# Patient Record
Sex: FEMALE | Race: WHITE | URBAN - METROPOLITAN AREA
[De-identification: names, ages, dates, MRNs, and addresses within clinical notes are randomized per-mention and may not be internally consistent; named-entity substitution may affect disease eponyms.]

---

## 2017-09-10 ENCOUNTER — APPOINTMENT (OUTPATIENT)
Dept: CT IMAGING | Facility: CLINIC | Age: 72
DRG: 040 | End: 2017-09-10
Attending: EMERGENCY MEDICINE
Payer: MEDICARE

## 2017-09-10 ENCOUNTER — HOSPITAL ENCOUNTER (INPATIENT)
Facility: CLINIC | Age: 72
LOS: 11 days | Discharge: HOME HEALTH | DRG: 040 | End: 2017-09-21
Attending: EMERGENCY MEDICINE | Admitting: PSYCHIATRY & NEUROLOGY
Payer: MEDICARE

## 2017-09-10 DIAGNOSIS — C64.1 RENAL CELL CARCINOMA OF RIGHT KIDNEY (H): Primary | ICD-10-CM

## 2017-09-10 DIAGNOSIS — I34.0 NON-RHEUMATIC MITRAL REGURGITATION: ICD-10-CM

## 2017-09-10 DIAGNOSIS — R47.01 APHASIA: ICD-10-CM

## 2017-09-10 DIAGNOSIS — G43.009 NONINTRACTABLE MIGRAINE, UNSPECIFIED MIGRAINE TYPE: ICD-10-CM

## 2017-09-10 DIAGNOSIS — Z86.73 PERSONAL HISTORY OF TRANSIENT CEREBRAL ISCHEMIA: ICD-10-CM

## 2017-09-10 DIAGNOSIS — I63.9 CEREBROVASCULAR ACCIDENT (CVA), UNSPECIFIED MECHANISM (H): ICD-10-CM

## 2017-09-10 DIAGNOSIS — I10 ESSENTIAL HYPERTENSION, MALIGNANT: ICD-10-CM

## 2017-09-10 LAB
ANION GAP SERPL CALCULATED.3IONS-SCNC: 6 MMOL/L (ref 3–14)
APTT PPP: 34 SEC (ref 22–37)
BUN SERPL-MCNC: 21 MG/DL (ref 7–30)
CALCIUM SERPL-MCNC: 8.2 MG/DL (ref 8.5–10.1)
CHLORIDE SERPL-SCNC: 113 MMOL/L (ref 94–109)
CO2 SERPL-SCNC: 23 MMOL/L (ref 20–32)
CREAT BLD-MCNC: 1.6 MG/DL (ref 0.52–1.04)
CREAT SERPL-MCNC: 1.48 MG/DL (ref 0.52–1.04)
ERYTHROCYTE [DISTWIDTH] IN BLOOD BY AUTOMATED COUNT: 14.6 % (ref 10–15)
GFR SERPL CREATININE-BSD FRML MDRD: 32 ML/MIN/1.7M2
GFR SERPL CREATININE-BSD FRML MDRD: 35 ML/MIN/1.7M2
GLUCOSE BLDC GLUCOMTR-MCNC: 100 MG/DL (ref 70–99)
GLUCOSE BLDC GLUCOMTR-MCNC: 150 MG/DL (ref 70–99)
GLUCOSE SERPL-MCNC: 112 MG/DL (ref 70–99)
HCT VFR BLD AUTO: 42.4 % (ref 35–47)
HGB BLD-MCNC: 14.5 G/DL (ref 11.7–15.7)
INR BLD: 1.2 (ref 0.86–1.14)
INR PPP: 0.93 (ref 0.86–1.14)
MCH RBC QN AUTO: 31.8 PG (ref 26.5–33)
MCHC RBC AUTO-ENTMCNC: 34.2 G/DL (ref 31.5–36.5)
MCV RBC AUTO: 93 FL (ref 78–100)
PLATELET # BLD AUTO: 245 10E9/L (ref 150–450)
POTASSIUM SERPL-SCNC: 4.5 MMOL/L (ref 3.4–5.3)
RADIOLOGIST FLAGS: ABNORMAL
RBC # BLD AUTO: 4.56 10E12/L (ref 3.8–5.2)
SODIUM SERPL-SCNC: 142 MMOL/L (ref 133–144)
TROPONIN I BLD-MCNC: 0 UG/L (ref 0–0.1)
TROPONIN I SERPL-MCNC: 0.04 UG/L (ref 0–0.04)
WBC # BLD AUTO: 9.6 10E9/L (ref 4–11)

## 2017-09-10 PROCEDURE — 99284 EMERGENCY DEPT VISIT MOD MDM: CPT | Mod: Z6 | Performed by: EMERGENCY MEDICINE

## 2017-09-10 PROCEDURE — 99285 EMERGENCY DEPT VISIT HI MDM: CPT | Mod: 25 | Performed by: EMERGENCY MEDICINE

## 2017-09-10 PROCEDURE — 85610 PROTHROMBIN TIME: CPT | Mod: QW

## 2017-09-10 PROCEDURE — 40000740 ZZH STATISTIC STROKE CODE W/ ACCESS

## 2017-09-10 PROCEDURE — A9270 NON-COVERED ITEM OR SERVICE: HCPCS | Mod: GY | Performed by: EMERGENCY MEDICINE

## 2017-09-10 PROCEDURE — 85610 PROTHROMBIN TIME: CPT | Performed by: EMERGENCY MEDICINE

## 2017-09-10 PROCEDURE — 85730 THROMBOPLASTIN TIME PARTIAL: CPT | Performed by: EMERGENCY MEDICINE

## 2017-09-10 PROCEDURE — 00000146 ZZHCL STATISTIC GLUCOSE BY METER IP

## 2017-09-10 PROCEDURE — 84484 ASSAY OF TROPONIN QUANT: CPT

## 2017-09-10 PROCEDURE — 12000008 ZZH R&B INTERMEDIATE UMMC

## 2017-09-10 PROCEDURE — 84484 ASSAY OF TROPONIN QUANT: CPT | Performed by: EMERGENCY MEDICINE

## 2017-09-10 PROCEDURE — 85027 COMPLETE CBC AUTOMATED: CPT | Performed by: EMERGENCY MEDICINE

## 2017-09-10 PROCEDURE — 40000827 ZZH STATISTIC TRAUMA CODE W/ ACCESS

## 2017-09-10 PROCEDURE — 25000132 ZZH RX MED GY IP 250 OP 250 PS 637: Mod: GY | Performed by: EMERGENCY MEDICINE

## 2017-09-10 PROCEDURE — 80048 BASIC METABOLIC PNL TOTAL CA: CPT | Performed by: EMERGENCY MEDICINE

## 2017-09-10 PROCEDURE — 82565 ASSAY OF CREATININE: CPT

## 2017-09-10 PROCEDURE — 70450 CT HEAD/BRAIN W/O DYE: CPT

## 2017-09-10 PROCEDURE — 40000141 ZZH STATISTIC PERIPHERAL IV START W/O US GUIDANCE

## 2017-09-10 RX ORDER — ASPIRIN 81 MG/1
81 TABLET ORAL DAILY
Status: DISCONTINUED | OUTPATIENT
Start: 2017-09-11 | End: 2017-09-21 | Stop reason: HOSPADM

## 2017-09-10 RX ORDER — NALOXONE HYDROCHLORIDE 0.4 MG/ML
.1-.4 INJECTION, SOLUTION INTRAMUSCULAR; INTRAVENOUS; SUBCUTANEOUS
Status: DISCONTINUED | OUTPATIENT
Start: 2017-09-10 | End: 2017-09-21 | Stop reason: HOSPADM

## 2017-09-10 RX ORDER — LEVOTHYROXINE SODIUM 88 UG/1
88 TABLET ORAL
Status: DISCONTINUED | OUTPATIENT
Start: 2017-09-11 | End: 2017-09-21 | Stop reason: HOSPADM

## 2017-09-10 RX ORDER — ONDANSETRON 2 MG/ML
4 INJECTION INTRAMUSCULAR; INTRAVENOUS EVERY 6 HOURS PRN
Status: DISCONTINUED | OUTPATIENT
Start: 2017-09-10 | End: 2017-09-21 | Stop reason: HOSPADM

## 2017-09-10 RX ORDER — HEPARIN SODIUM 5000 [USP'U]/.5ML
5000 INJECTION, SOLUTION INTRAVENOUS; SUBCUTANEOUS EVERY 12 HOURS
Status: DISCONTINUED | OUTPATIENT
Start: 2017-09-11 | End: 2017-09-21 | Stop reason: HOSPADM

## 2017-09-10 RX ORDER — LIDOCAINE 40 MG/G
CREAM TOPICAL
Status: DISCONTINUED | OUTPATIENT
Start: 2017-09-10 | End: 2017-09-21 | Stop reason: HOSPADM

## 2017-09-10 RX ORDER — CARVEDILOL 3.12 MG/1
12.5 TABLET ORAL 2 TIMES DAILY WITH MEALS
Status: DISCONTINUED | OUTPATIENT
Start: 2017-09-11 | End: 2017-09-21 | Stop reason: HOSPADM

## 2017-09-10 RX ORDER — IOPAMIDOL 755 MG/ML
75 INJECTION, SOLUTION INTRAVASCULAR ONCE
Status: DISCONTINUED | OUTPATIENT
Start: 2017-09-10 | End: 2017-09-10

## 2017-09-10 RX ORDER — ROSUVASTATIN CALCIUM 10 MG/1
10 TABLET, COATED ORAL DAILY
Status: DISCONTINUED | OUTPATIENT
Start: 2017-09-11 | End: 2017-09-13

## 2017-09-10 RX ORDER — METOCLOPRAMIDE HYDROCHLORIDE 5 MG/ML
10 INJECTION INTRAMUSCULAR; INTRAVENOUS ONCE
Status: DISCONTINUED | OUTPATIENT
Start: 2017-09-10 | End: 2017-09-15 | Stop reason: CLARIF

## 2017-09-10 RX ORDER — ACETAMINOPHEN 325 MG/1
650 TABLET ORAL EVERY 4 HOURS PRN
Status: DISCONTINUED | OUTPATIENT
Start: 2017-09-10 | End: 2017-09-16

## 2017-09-10 RX ORDER — CLOPIDOGREL BISULFATE 75 MG/1
75 TABLET ORAL DAILY
Status: DISCONTINUED | OUTPATIENT
Start: 2017-09-11 | End: 2017-09-21 | Stop reason: HOSPADM

## 2017-09-10 RX ORDER — ONDANSETRON 4 MG/1
4 TABLET, ORALLY DISINTEGRATING ORAL EVERY 6 HOURS PRN
Status: DISCONTINUED | OUTPATIENT
Start: 2017-09-10 | End: 2017-09-21 | Stop reason: HOSPADM

## 2017-09-10 RX ORDER — LABETALOL HYDROCHLORIDE 5 MG/ML
10 INJECTION, SOLUTION INTRAVENOUS EVERY 10 MIN PRN
Status: DISCONTINUED | OUTPATIENT
Start: 2017-09-10 | End: 2017-09-21 | Stop reason: HOSPADM

## 2017-09-10 RX ORDER — ACETAMINOPHEN 650 MG/1
650 SUPPOSITORY RECTAL EVERY 4 HOURS PRN
Status: DISCONTINUED | OUTPATIENT
Start: 2017-09-10 | End: 2017-09-16

## 2017-09-10 RX ORDER — OXYCODONE AND ACETAMINOPHEN 5; 325 MG/1; MG/1
1 TABLET ORAL ONCE
Status: COMPLETED | OUTPATIENT
Start: 2017-09-10 | End: 2017-09-10

## 2017-09-10 RX ORDER — SODIUM CHLORIDE 9 MG/ML
INJECTION, SOLUTION INTRAVENOUS CONTINUOUS
Status: DISCONTINUED | OUTPATIENT
Start: 2017-09-10 | End: 2017-09-12

## 2017-09-10 RX ADMIN — OXYCODONE HYDROCHLORIDE AND ACETAMINOPHEN 1 TABLET: 5; 325 TABLET ORAL at 21:30

## 2017-09-10 ASSESSMENT — VISUAL ACUITY
OU: BASELINE

## 2017-09-10 ASSESSMENT — ENCOUNTER SYMPTOMS: SPEECH DIFFICULTY: 1

## 2017-09-10 NOTE — IP AVS SNAPSHOT
[address] [address]    Phone:  [phone]                                       After Visit Summary   9/10/2017    Sowmya Gibbs    MRN: [id number]           After Visit Summary Signature Page     I have received my discharge instructions, and my questions have been answered. I have discussed any challenges I see with this plan with the nurse or doctor.    ..........................................................................................................................................  Patient/Patient Representative Signature      ..........................................................................................................................................  Patient Representative Print Name and Relationship to Patient    ..................................................               ................................................  Date                                            Time    ..........................................................................................................................................  Reviewed by Signature/Title    ...................................................              ..............................................  Date                                                            Time

## 2017-09-10 NOTE — IP AVS SNAPSHOT
MRN:[id number]                      After Visit Summary   9/10/2017    Sowmya Gibbs    MRN: [id number]           Thank you!     Thank you for choosing [facility] for your care. Our goal is always to provide you with excellent care. Hearing back from our patients is one way we can continue to improve our services. Please take a few minutes to complete the written survey that you may receive in the mail after you visit with us. Thank you!        Patient Information     Date Of Birth          1945        Designated Caregiver       Most Recent Value    Caregiver    Will someone help with your care after discharge? no    Name of designated caregiver  Celina Johnson    Phone number of caregiver [phone]      About your hospital stay     You were admitted on:  September 10, 2017 You last received care in the:  [address] [facility]    You were discharged on:  September 21, 2017        Reason for your hospital stay       Acute Stroke                  Who to Call     For medical emergencies, please call 911.  For non-urgent questions about your medical care, please call your primary care provider or clinic, [phone]          Attending Provider     Provider Specialty    Angie Orr MD Emergency Medicine    Barrington, Giovanny Harvey MD Neurology       Primary Care Provider Office Phone # Fax #    Chaka Knott [phone]       When to contact your care team       Call the Stroke Team Your risk factors for stroke or TIA (transient ischemic attack): Your risk factors for stroke or TIA (transient ischemic attack):    Your Risk Factors Your Results Normal Ranges  High blood pressure BP Readings from Last 1 Encounters:  09/19/17 : 107/63   Less than 120/80  Cholesterol              Total Lab Results       Component                Value               Date                       CHOL                     161                 09/11/2017             Less than 150    Triglycerides   Lab Results       Component                Value               Date                       TRIG                     224                 2017           Less than 150  LDL Lab Results       Component                Value               Date                       LDL                      88                  2017              Less than 70  HDL Lab Results       Component                Value               Date                       HDL                      28                  2017                   Greater than 40 (men)  Greater than 50 (women)  Diabetes @labrcntip(g)@ Fasting blood glucose   Smoking/tobacco use  Quit smoking and tobacco  Overweight  Lose 1-2 pounds a week  Lack of exercise  30 minutes moderate activity each day  Other risk factors include carotid (neck) artery disease, atrial fibrillation and stress. You may be on new medicine to treat high blood pressure, cholesterol, diabetes or atrial fibrillation.    Understanding Stroke Booklet given to patient. Please refer to booklet for further information.    Stroke warning signs and symptoms - CALL 911 right away for:  - Sudden numbness or weakness in the face, arm or leg (often on one side of the body).  - Sudden confusion or trouble understanding what is going on.  - Sudden blurred or decreased vision in one or both eyes.  - Sudden trouble speaking, loss of balance, dizziness or problems with coordination.  - Sudden, severe headache for no reason.  - Fainting or seizures.  - Symptoms may go away then come back suddenly.                  After Care Instructions     Activity       Your activity upon discharge: activity as tolerated            Diet       Follow this diet upon discharge: Orders Placed This Encounter      Calorie Counts      Snacks/Supplements Adult: Ensure Plus (Adult); Between Meals      Regular Diet Adult            Discharge Instructions           Monitor and record       blood pressure daily                   Follow-up Appointments     Adult [facility] Follow-up and recommended labs and tests       Follow up with primary care provider, OCHOA HAN, within 7 days to evaluate medication change.  No follow up labs or test are needed.      Appointments on [place] and/or [facility] (with [facility] or [facility] provider or service). Call [phone] if you haven't heard regarding these appointments within 7 days of discharge.            Follow Up and recommended labs and tests       Please contact Stroke Clinic- Neurology Clinic at [phone], pick option #1,  if you have not been contacted to schedule a stroke follow up appointment in 5 days of discharge.  If you have other stroke related questions, please call [phone], pick option #3, and ask to speak to Jian Wong RN Stroke/Endovascular Care Coordinator.  If you have any urgent stroke related questions after hours, please contact the hospital  at [phone] and ask to be connected to a stroke provider.                  Your next 10 appointments already scheduled     Feb 13, 2018 11:20 AM CST   (Arrive by 11:05 AM)   New Stroke with Hoa Damico MD   [facility] ([facility])    [address]  3rd Floor  [place] [address] [phone]              Additional Services     Cardiology Eval Adult Referral       Your provider has referred you to: for the mobile [place]     Please be aware that coverage of these services is subject to the terms and limitations of your health insurance plan.  Call member services at your health plan with any benefit or coverage questions.      Type of Referral:  Cardiology Follow Up    Timeframe requested:  Less than 1 week    Please bring the following to your appointment:  >>   Any x-rays, CTs or MRIs which have been performed.  Contact the facility where they were done to arrange for  prior to your scheduled appointment.    >>   List of current  medications  >>   This referral request   >>   Any documents/labs given to you for this referral            Cardiology Eval Adult Referral       Your provider has referred you to:  [facility]: [facility] [phone]   [email or web address]    Please be aware that coverage of these services is subject to the terms and limitations of your health insurance plan.  Call member services at your health plan with any benefit or coverage questions.      Type of Referral:  Cardiology Follow Up    Timeframe requested:  >1 week    Please bring the following to your appointment:  >>   Any x-rays, CTs or MRIs which have been performed.  Contact the facility where they were done to arrange for  prior to your scheduled appointment.    >>   List of current medications  >>   This referral request   >>   Any documents/labs given to you for this referral            Home Care SLP Referral for Hospital Discharge       SLP to eval and treat    Your provider has ordered home care - speech therapy. If you have not been contacted within 2 days of your discharge please call the department phone number listed on the top of this document.            Home Care Social Service Referral for Hospital Discharge        to her financial evaluation    Your provider has ordered home care - . If you have not been contacted within 2 days of your discharge please call the department phone number listed on the top of this document.            Speech Therapy Referral       This therapy referral will be filtered to a centralized scheduling office at [facility] and the patient will receive a call to schedule an appointment at a [facility] location most convenient for them.    [facility] provides Speech Therapy evaluation and treatment and many specialty services across the [facility] system.  If requesting  "a specialty program, please choose from the list below.  If you have not heard from the scheduling office within 2 business days, please call [phone] for all locations, with the exception of [place], please call [phone].       Treatment: Evaluation & Treatment  Speech Treatment Diagnosis: Aphasia    Special Instructions: for expressive Aphasia   Special Programs: Augmentative Communication    Please be aware that coverage of these services is subject to the terms and limitations of your health insurance plan.  Call member services at your health plan with any benefit or coverage questions.      **Note to Provider:  If you are referring outside of [facility] for the therapy appointment, please list the name of the location in the \"special instructions\" above, print the referral and give to the patient to schedule the appointment.                  Future tests that were ordered for you     CT Chest abdomen pelvis w & w/o contrast       Administration of IV contrast (contrast agent, dose, and amount) will be tailored to this examination per the appropriate written protocol listed in the Protocol E-Book, or by the supervising imaging provider. Please check Cr levels prior to to examination.                  Further instructions from your care team         Home Care after a Loop Recorder Implant  Wound care:  Check for signs of infection each day.  These include increased redness, swelling, drainage or a fever over 101 F (38.3 C).  Call us immediately if you see any of these signs.  You may shower 24 hours after the procedure.  Do not submerge the incision (in a bath tub, hot tub, or swimming pool) until fully healed.  Do not apply powder or lotion to the incision for 14 days.     Pain:   You may have mild pain for 3 to 5 days.  Take acetaminophen (Tylenol) or ibuprofen (Advil) for the pain.  Call us if the pain is severe or lasts more than 5 days.    Activity:  After 24 hours, slowly return to your normal " activities.      Avoid anything that may cause rough contact or a hard hit to your chest.  This includes football, hockey, and other contact sports.    Follow Up Visits:  Return to the clinic in 7 to 10 days to have your loop recorder and wound checked.      Telling others about your device:  Before you have any medical tests or treatments, tell the doctors, dentists, and other care providers about your device.  There are a few tests and treatments that may interfere with your device.  Your care team may need to take special steps to keep you safe.  Before you leave the hospital, you will receive a temporary ID card.  A permanent card will be mailed to you about 6 to 8 weeks later.  Always carry the ID card with you.  It has important details about your device.  Safety near electrical equipment:  All of these are safe to use when in good repair -    Microwaves    Radios    Cordless phone    Remote controls    Small electrical tools  Security sherman: It is okay to walk through security sherman at the airports and department stores. Tell airport security that you have an implanted loop recorder.  Full-body scanners are safe.  Activator: Black and Gray (pictured below). Carry this with you. Press the button if you have symptoms.            Home Monitor: White and Blue (pictured below). Plug in at home. Use under device clinic direction.    Questions?  Please call [facility].    General inquiry: [phone]    Device Nurse:          Business Hours:  [phone]                         After Hours:  [phone]   Choose option 4, then ask for the                             on-call device nurse at job code [id number].    Your next device clinic appointment is scheduled on:     ___________________________ at _____________.          [facility] - [address]  [address] [place] [address]  [phone]        Pending Results     No orders found from 9/8/2017  "to 2017.            Statement of Approval     Ordered          17 4756  I have reviewed and agree with all the recommendations and orders detailed in this document.  EFFECTIVE NOW     Approved and electronically signed by:  Anat Duncan MD           17 2687  I have reviewed and agree with all the recommendations and orders detailed in this document.  EFFECTIVE NOW     Approved and electronically signed by:  Noé Gage MD             Admission Information     Date & Time Provider Department Dept. Phone    9/10/2017 Giovanny Negrete MD [address] [facility] [place] [phone]      Your Vitals Were     Blood Pressure Pulse Temperature Respirations Weight Last Period    100/56 (BP Location: Right arm) 80 96.2  F (35.7  C) (Oral) 16 50.6 kg (111 lb 8 oz) 2003    Pulse Oximetry BMI (Body Mass Index)                96% 18.55 kg/m2          MyChart Information     [employer] lets you send messages to your doctor, view your test results, renew your prescriptions, schedule appointments and more. To sign up, go to [email or web address] . Click on \"Log in\" on the left side of the screen, which will take you to the Welcome page. Then click on \"Sign up Now\" on the right side of the page.     You will be asked to enter the access code listed below, as well as some personal information. Please follow the directions to create your username and password.     Your access code is: [email or web address]  Expires: 2017  9:16 AM     Your access code will  in 90 days. If you need help or a new code, please call your [facility] clinic or [phone].        Care EveryWhere ID     This is your Care EveryWhere ID. This could be used by other organizations to access your [facility] medical records  [id number]        Equal Access to Services     KATHY BRICENO : Arnel Evangelista, gisela chau, deirdre rosado. So [facility] [phone].    ATENCIÓN: " Si val colbert, tiene a ni disposición servicios gratuitos de asistencia lingüística. Ronald dallas [phone].    We comply with applicable federal civil rights laws and [place] laws. We do not discriminate on the basis of race, color, national origin, age, disability sex, sexual orientation or gender identity.               Review of your medicines      START taking        Dose / Directions    acetaminophen 325 MG tablet   Commonly known as:  TYLENOL   Used for:  Cerebrovascular accident (CVA), unspecified mechanism (H)        Dose:  650 mg   Take 2 tablets (650 mg) by mouth every 6 hours as needed for mild pain or fever   Quantity:  100 tablet   Refills:  11       aspirin 81 MG EC tablet   Used for:  Cerebrovascular accident (CVA), unspecified mechanism (H)        Dose:  81 mg   Take 1 tablet (81 mg) by mouth daily   Quantity:  60 tablet   Refills:  11       atorvastatin 80 MG tablet   Commonly known as:  LIPITOR   Used for:  Cerebrovascular accident (CVA), unspecified mechanism (H)        Dose:  80 mg   Take 1 tablet (80 mg) by mouth daily   Quantity:  30 tablet   Refills:  11       ibuprofen 600 MG tablet   Commonly known as:  ADVIL/MOTRIN   Used for:  Cerebrovascular accident (CVA), unspecified mechanism (H)        Dose:  600 mg   Take 1 tablet (600 mg) by mouth every 6 hours as needed for moderate pain   Quantity:  120 tablet   Refills:  3         CONTINUE these medicines which may have CHANGED, or have new prescriptions. If we are uncertain of the size of tablets/capsules you have at home, strength may be listed as something that might have changed.        Dose / Directions    * carvedilol 12.5 MG tablet   Commonly known as:  COREG   This may have changed:  Another medication with the same name was added. Make sure you understand how and when to take each.   Used for:  Stable angina (H), Coronary artery disease involving native coronary artery of native heart without angina pectoris        Dose:  12.5 mg    Take 1 tablet (12.5 mg) by mouth 2 times daily (with meals)   Quantity:  180 tablet   Refills:  0       * carvedilol 12.5 MG tablet   Commonly known as:  COREG   This may have changed:  You were already taking a medication with the same name, and this prescription was added. Make sure you understand how and when to take each.   Used for:  Cerebrovascular accident (CVA), unspecified mechanism (H)        Dose:  12.5 mg   Take 1 tablet (12.5 mg) by mouth 2 times daily (with meals)   Quantity:  60 tablet   Refills:  11       * levothyroxine 88 MCG tablet   Commonly known as:  SYNTHROID/LEVOTHROID   This may have changed:  Another medication with the same name was added. Make sure you understand how and when to take each.   Used for:  Other specified hypothyroidism        Dose:  88 mcg   Take 1 tablet (88 mcg) by mouth daily   Quantity:  90 tablet   Refills:  3       * levothyroxine 88 MCG tablet   Commonly known as:  SYNTHROID/LEVOTHROID   This may have changed:  You were already taking a medication with the same name, and this prescription was added. Make sure you understand how and when to take each.   Used for:  Cerebrovascular accident (CVA), unspecified mechanism (H)        Dose:  88 mcg   Take 1 tablet (88 mcg) by mouth every morning (before breakfast)   Quantity:  30 tablet   Refills:  11       * Notice:  This list has 4 medication(s) that are the same as other medications prescribed for you. Read the directions carefully, and ask your doctor or other care provider to review them with you.      CONTINUE these medicines which have NOT CHANGED        Dose / Directions    Alpha-Lipoic Acid 200 MG Tabs   Used for:  Type 2 diabetes mellitus with diabetic nephropathy (H)        Dose:  1 tablet   Take 1 tablet by mouth 2 times daily   Quantity:  180 tablet   Refills:  3       ASPIRIN NOT PRESCRIBED   Commonly known as:  INTENTIONAL   Used for:  Type 2 diabetes mellitus, controlled, with renal complications (H),  Coronary artery disease involving native coronary artery without angina pectoris        Dose:  1 each   1 each daily Antiplatelet medication not prescribed intentionally due to Current thienopryridine therapy   Quantity:  0 each   Refills:  0       blood glucose calibration NORMAL solution   Used for:  Type 2 diabetes, HbA1C goal < 8% (H)        Use to calibrate blood glucose monitor as directed.   Quantity:  1 Bottle   Refills:  11       blood glucose lancing device   Used for:  Type 2 diabetes mellitus with diabetic nephropathy (H)        Use to test blood sugars TWICE DAILY  times daily or as directed.   Quantity:  300 each   Refills:  0       blood glucose monitoring lancets   Used for:  Type 2 diabetes, HbA1C goal < 8% (H)        Use to test blood sugars TWICE DAILY  times daily or as directed. GENERIC OK   Quantity:  1 Box   Refills:  11       blood glucose monitoring meter device kit   Used for:  Type 2 diabetes, HbA1C goal < 8% (H)        Use to test blood sugars TWICE DAILY  times daily or as directed. MAY HAVE GENERIC CONTROL SOLUTIONS, STRIPS TWICE DAILY AND LANCETS  NEWLY DISCOVERED DIABETES 2 GOAL 8% 250.00 MAY HAVE GENERIC KIT AS WELL   Quantity:  1 kit   Refills:  0       blood glucose monitoring test strip   Commonly known as:  ZEE CONTOUR   Used for:  Type 2 diabetes, HbA1C goal < 8% (H)        Use to test blood sugars TWICE DAILY  times daily or as directed.   Quantity:  100 strip   Refills:  11       cinnamon 500 MG Caps   Commonly known as:  HM CINNAMON   Used for:  Coronary artery disease involving native coronary artery of native heart without angina pectoris        Dose:  2 capful   Take 2 capfuls by mouth 3 times daily   Quantity:  180 capsule   Refills:  11       clopidogrel 75 MG tablet   Commonly known as:  PLAVIX   Used for:  Cerebrovascular accident (CVA), unspecified mechanism (H)        Dose:  75 mg   Take 1 tablet (75 mg) by mouth daily   Quantity:  30 tablet   Refills:  3        FISH OIL        Dose:  3 capsule   3 capsules every morning.   Refills:  0       HYDROcodone-acetaminophen 7.5-325 MG per tablet   Commonly known as:  NORCO   Used for:  Nonintractable migraine, unspecified migraine type        Dose:  1 tablet   Take 1 tablet by mouth every 6 hours as needed for moderate to severe pain   Quantity:  30 tablet   Refills:  0       nitroGLYcerin 0.4 MG sublingual tablet   Commonly known as:  NITROSTAT   Used for:  CAD (coronary artery disease)        Dose:  0.4 mg   Place 1 tablet (0.4 mg) under the tongue every 5 minutes as needed for chest pain   Quantity:  25 tablet   Refills:  prn       SODIUM BICARBONATE PO        Dose:  20 mg   Take 20 mg by mouth 3 times daily   Refills:  0       vitamin D 2000 UNITS Caps   Used for:  Vitamin D deficiency        Dose:  1 tablet   Take 1 tablet by mouth daily   Quantity:  90 capsule   Refills:  3       zolpidem 5 MG tablet   Commonly known as:  AMBIEN   Used for:  Insomnia, unspecified insomnia        TAKE ONE TABLET BY MOUTH ONCE DAILY AT BEDTIME AS NEEDED   Quantity:  15 tablet   Refills:  2         STOP taking     fenofibrate 145 MG tablet           rosuvastatin 10 MG tablet   Commonly known as:  CRESTOR                Where to get your medicines      These medications were sent to [facility] [place], [place] - [address]  [address] [address]     Phone:  [phone]     acetaminophen 325 MG tablet    carvedilol 12.5 MG tablet    levothyroxine 88 MCG tablet         Some of these will need a paper prescription and others can be bought over the counter. Ask your nurse if you have questions.     Bring a paper prescription for each of these medications     aspirin 81 MG EC tablet    atorvastatin 80 MG tablet    clopidogrel 75 MG tablet    ibuprofen 600 MG tablet                Protect others around you: Learn how to safely use, store and throw away your medicines at www.disposemymeds.org.              Medication List: This is a list of all your medications and when to take them. Check marks below indicate your daily home schedule. Keep this list as a reference.      Medications           Morning Afternoon Evening Bedtime As Needed    acetaminophen 325 MG tablet   Commonly known as:  TYLENOL   Take 2 tablets (650 mg) by mouth every 6 hours as needed for mild pain or fever   Last time this was given:  650 mg on 9/19/2017  1:25 AM                                Alpha-Lipoic Acid 200 MG Tabs   Take 1 tablet by mouth 2 times daily                                aspirin 81 MG EC tablet   Take 1 tablet (81 mg) by mouth daily   Last time this was given:  81 mg on 9/21/2017  8:09 AM                                ASPIRIN NOT PRESCRIBED   Commonly known as:  INTENTIONAL   1 each daily Antiplatelet medication not prescribed intentionally due to Current thienopryridine therapy                                atorvastatin 80 MG tablet   Commonly known as:  LIPITOR   Take 1 tablet (80 mg) by mouth daily   Last time this was given:  80 mg on 9/21/2017  8:14 AM                                blood glucose calibration NORMAL solution   Use to calibrate blood glucose monitor as directed.                                blood glucose lancing device   Use to test blood sugars TWICE DAILY  times daily or as directed.                                blood glucose monitoring lancets   Use to test blood sugars TWICE DAILY  times daily or as directed. GENERIC OK                                blood glucose monitoring meter device kit   Use to test blood sugars TWICE DAILY  times daily or as directed. MAY HAVE GENERIC CONTROL SOLUTIONS, STRIPS TWICE DAILY AND LANCETS  NEWLY DISCOVERED DIABETES 2 GOAL 8% 250.00 MAY HAVE GENERIC KIT AS WELL                                blood glucose monitoring test strip   Commonly known as:  Moment CONTOUR   Use to test blood sugars TWICE DAILY  times daily or as directed.                                *  carvedilol 12.5 MG tablet   Commonly known as:  COREG   Take 1 tablet (12.5 mg) by mouth 2 times daily (with meals)   Last time this was given:  12.5 mg on 9/19/2017  6:19 PM                                * carvedilol 12.5 MG tablet   Commonly known as:  COREG   Take 1 tablet (12.5 mg) by mouth 2 times daily (with meals)   Last time this was given:  12.5 mg on 9/19/2017  6:19 PM                                cinnamon 500 MG Caps   Commonly known as:  HM CINNAMON   Take 2 capfuls by mouth 3 times daily                                clopidogrel 75 MG tablet   Commonly known as:  PLAVIX   Take 1 tablet (75 mg) by mouth daily   Last time this was given:  75 mg on 9/21/2017  8:09 AM                                FISH OIL   3 capsules every morning.                                HYDROcodone-acetaminophen 7.5-325 MG per tablet   Commonly known as:  NORCO   Take 1 tablet by mouth every 6 hours as needed for moderate to severe pain   Last time this was given:  2 tablets on 9/21/2017 12:33 PM                                ibuprofen 600 MG tablet   Commonly known as:  ADVIL/MOTRIN   Take 1 tablet (600 mg) by mouth every 6 hours as needed for moderate pain   Last time this was given:  600 mg on 9/20/2017  5:01 PM                                * levothyroxine 88 MCG tablet   Commonly known as:  SYNTHROID/LEVOTHROID   Take 1 tablet (88 mcg) by mouth daily   Last time this was given:  88 mcg on 9/21/2017  8:09 AM                                * levothyroxine 88 MCG tablet   Commonly known as:  SYNTHROID/LEVOTHROID   Take 1 tablet (88 mcg) by mouth every morning (before breakfast)   Last time this was given:  88 mcg on 9/21/2017  8:09 AM                                nitroGLYcerin 0.4 MG sublingual tablet   Commonly known as:  NITROSTAT   Place 1 tablet (0.4 mg) under the tongue every 5 minutes as needed for chest pain                                SODIUM BICARBONATE PO   Take 20 mg by mouth 3 times daily                                 vitamin D 2000 UNITS Caps   Take 1 tablet by mouth daily                                zolpidem 5 MG tablet   Commonly known as:  AMBIEN   TAKE ONE TABLET BY MOUTH ONCE DAILY AT BEDTIME AS NEEDED   Last time this was given:  2.5 mg on 9/20/2017 10:17 PM                                * Notice:  This list has 4 medication(s) that are the same as other medications prescribed for you. Read the directions carefully, and ask your doctor or other care provider to review them with you.

## 2017-09-11 ENCOUNTER — APPOINTMENT (OUTPATIENT)
Dept: MRI IMAGING | Facility: CLINIC | Age: 72
DRG: 040 | End: 2017-09-11
Payer: MEDICARE

## 2017-09-11 ENCOUNTER — APPOINTMENT (OUTPATIENT)
Dept: SPEECH THERAPY | Facility: CLINIC | Age: 72
DRG: 040 | End: 2017-09-11
Payer: MEDICARE

## 2017-09-11 ENCOUNTER — APPOINTMENT (OUTPATIENT)
Dept: CARDIOLOGY | Facility: CLINIC | Age: 72
DRG: 040 | End: 2017-09-11
Payer: MEDICARE

## 2017-09-11 LAB
CARDIOLIPIN ANTIBODY IGG: <1.6 GPL-U/ML (ref 0–19.9)
CARDIOLIPIN ANTIBODY IGM: 0.3 MPL-U/ML (ref 0–19.9)
CHOLEST SERPL-MCNC: 161 MG/DL
CRP SERPL-MCNC: <2.9 MG/L (ref 0–8)
D DIMER PPP FEU-MCNC: 0.8 UG/ML FEU (ref 0–0.5)
ERYTHROCYTE [SEDIMENTATION RATE] IN BLOOD BY WESTERGREN METHOD: 43 MM/H (ref 0–30)
GLUCOSE BLDC GLUCOMTR-MCNC: 138 MG/DL (ref 70–99)
GLUCOSE BLDC GLUCOMTR-MCNC: 158 MG/DL (ref 70–99)
GLUCOSE BLDC GLUCOMTR-MCNC: 92 MG/DL (ref 70–99)
HDLC SERPL-MCNC: 28 MG/DL
LDLC SERPL CALC-MCNC: 88 MG/DL
NONHDLC SERPL-MCNC: 133 MG/DL
PLATELET # BLD AUTO: 192 10E9/L (ref 150–450)
TRIGL SERPL-MCNC: 224 MG/DL

## 2017-09-11 PROCEDURE — 85049 AUTOMATED PLATELET COUNT: CPT | Performed by: PSYCHIATRY & NEUROLOGY

## 2017-09-11 PROCEDURE — 25000132 ZZH RX MED GY IP 250 OP 250 PS 637: Mod: GY | Performed by: PSYCHIATRY & NEUROLOGY

## 2017-09-11 PROCEDURE — 86146 BETA-2 GLYCOPROTEIN ANTIBODY: CPT | Performed by: STUDENT IN AN ORGANIZED HEALTH CARE EDUCATION/TRAINING PROGRAM

## 2017-09-11 PROCEDURE — 25000132 ZZH RX MED GY IP 250 OP 250 PS 637: Mod: GY | Performed by: STUDENT IN AN ORGANIZED HEALTH CARE EDUCATION/TRAINING PROGRAM

## 2017-09-11 PROCEDURE — 00000167 ZZHCL STATISTIC INR NC: Performed by: STUDENT IN AN ORGANIZED HEALTH CARE EDUCATION/TRAINING PROGRAM

## 2017-09-11 PROCEDURE — 86147 CARDIOLIPIN ANTIBODY EA IG: CPT | Performed by: STUDENT IN AN ORGANIZED HEALTH CARE EDUCATION/TRAINING PROGRAM

## 2017-09-11 PROCEDURE — 85379 FIBRIN DEGRADATION QUANT: CPT | Performed by: STUDENT IN AN ORGANIZED HEALTH CARE EDUCATION/TRAINING PROGRAM

## 2017-09-11 PROCEDURE — 85652 RBC SED RATE AUTOMATED: CPT | Performed by: PSYCHIATRY & NEUROLOGY

## 2017-09-11 PROCEDURE — 40000893 ZZH STATISTIC PT IP EVAL DEFER

## 2017-09-11 PROCEDURE — 92507 TX SP LANG VOICE COMM INDIV: CPT | Mod: GN

## 2017-09-11 PROCEDURE — 36415 COLL VENOUS BLD VENIPUNCTURE: CPT | Performed by: STUDENT IN AN ORGANIZED HEALTH CARE EDUCATION/TRAINING PROGRAM

## 2017-09-11 PROCEDURE — A9270 NON-COVERED ITEM OR SERVICE: HCPCS | Mod: GY | Performed by: STUDENT IN AN ORGANIZED HEALTH CARE EDUCATION/TRAINING PROGRAM

## 2017-09-11 PROCEDURE — 93306 TTE W/DOPPLER COMPLETE: CPT

## 2017-09-11 PROCEDURE — 00000146 ZZHCL STATISTIC GLUCOSE BY METER IP

## 2017-09-11 PROCEDURE — 00000401 ZZHCL STATISTIC THROMBIN TIME NC: Performed by: STUDENT IN AN ORGANIZED HEALTH CARE EDUCATION/TRAINING PROGRAM

## 2017-09-11 PROCEDURE — 40000264 ECHO COMPLETE BUBBLE

## 2017-09-11 PROCEDURE — 36415 COLL VENOUS BLD VENIPUNCTURE: CPT | Performed by: PSYCHIATRY & NEUROLOGY

## 2017-09-11 PROCEDURE — 70544 MR ANGIOGRAPHY HEAD W/O DYE: CPT

## 2017-09-11 PROCEDURE — 85730 THROMBOPLASTIN TIME PARTIAL: CPT | Performed by: STUDENT IN AN ORGANIZED HEALTH CARE EDUCATION/TRAINING PROGRAM

## 2017-09-11 PROCEDURE — 12000008 ZZH R&B INTERMEDIATE UMMC

## 2017-09-11 PROCEDURE — 86140 C-REACTIVE PROTEIN: CPT | Performed by: PSYCHIATRY & NEUROLOGY

## 2017-09-11 PROCEDURE — 80061 LIPID PANEL: CPT | Performed by: PSYCHIATRY & NEUROLOGY

## 2017-09-11 PROCEDURE — 85597 PHOSPHOLIPID PLTLT NEUTRALIZ: CPT | Performed by: STUDENT IN AN ORGANIZED HEALTH CARE EDUCATION/TRAINING PROGRAM

## 2017-09-11 PROCEDURE — 85613 RUSSELL VIPER VENOM DILUTED: CPT | Performed by: STUDENT IN AN ORGANIZED HEALTH CARE EDUCATION/TRAINING PROGRAM

## 2017-09-11 PROCEDURE — 25000128 H RX IP 250 OP 636: Performed by: PSYCHIATRY & NEUROLOGY

## 2017-09-11 PROCEDURE — 92523 SPEECH SOUND LANG COMPREHEN: CPT | Mod: GN

## 2017-09-11 PROCEDURE — A9270 NON-COVERED ITEM OR SERVICE: HCPCS | Mod: GY | Performed by: PSYCHIATRY & NEUROLOGY

## 2017-09-11 PROCEDURE — 25000128 H RX IP 250 OP 636: Performed by: STUDENT IN AN ORGANIZED HEALTH CARE EDUCATION/TRAINING PROGRAM

## 2017-09-11 PROCEDURE — 70547 MR ANGIOGRAPHY NECK W/O DYE: CPT

## 2017-09-11 PROCEDURE — 40000225 ZZH STATISTIC SLP WARD VISIT

## 2017-09-11 PROCEDURE — 85732 THROMBOPLASTIN TIME PARTIAL: CPT | Performed by: STUDENT IN AN ORGANIZED HEALTH CARE EDUCATION/TRAINING PROGRAM

## 2017-09-11 PROCEDURE — 93306 TTE W/DOPPLER COMPLETE: CPT | Mod: 26 | Performed by: INTERNAL MEDICINE

## 2017-09-11 PROCEDURE — 83036 HEMOGLOBIN GLYCOSYLATED A1C: CPT | Performed by: PSYCHIATRY & NEUROLOGY

## 2017-09-11 RX ORDER — LORAZEPAM 1 MG/1
1 TABLET ORAL ONCE
Status: DISCONTINUED | OUTPATIENT
Start: 2017-09-11 | End: 2017-09-11

## 2017-09-11 RX ORDER — HYDROCODONE BITARTRATE AND ACETAMINOPHEN 7.5; 325 MG/1; MG/1
1 TABLET ORAL EVERY 8 HOURS PRN
Status: DISCONTINUED | OUTPATIENT
Start: 2017-09-11 | End: 2017-09-11

## 2017-09-11 RX ORDER — FENTANYL CITRATE 50 UG/ML
25 INJECTION, SOLUTION INTRAMUSCULAR; INTRAVENOUS EVERY 5 MIN PRN
Status: DISCONTINUED | OUTPATIENT
Start: 2017-09-11 | End: 2017-09-11

## 2017-09-11 RX ORDER — HYDROCODONE BITARTRATE AND ACETAMINOPHEN 7.5; 325 MG/1; MG/1
1-2 TABLET ORAL EVERY 6 HOURS PRN
Status: DISCONTINUED | OUTPATIENT
Start: 2017-09-11 | End: 2017-09-15

## 2017-09-11 RX ORDER — LORAZEPAM 2 MG/ML
1 INJECTION INTRAMUSCULAR ONCE
Status: COMPLETED | OUTPATIENT
Start: 2017-09-11 | End: 2017-09-11

## 2017-09-11 RX ORDER — FENTANYL CITRATE 50 UG/ML
25-50 INJECTION, SOLUTION INTRAMUSCULAR; INTRAVENOUS
Status: DISCONTINUED | OUTPATIENT
Start: 2017-09-11 | End: 2017-09-11

## 2017-09-11 RX ORDER — NICOTINE 21 MG/24HR
1 PATCH, TRANSDERMAL 24 HOURS TRANSDERMAL DAILY
Status: DISCONTINUED | OUTPATIENT
Start: 2017-09-11 | End: 2017-09-11

## 2017-09-11 RX ADMIN — ROSUVASTATIN CALCIUM 10 MG: 10 TABLET, FILM COATED ORAL at 07:32

## 2017-09-11 RX ADMIN — HEPARIN SODIUM 5000 UNITS: 5000 INJECTION, SOLUTION INTRAVENOUS; SUBCUTANEOUS at 07:39

## 2017-09-11 RX ADMIN — LORAZEPAM 1 MG: 2 INJECTION INTRAMUSCULAR; INTRAVENOUS at 17:16

## 2017-09-11 RX ADMIN — ACETAMINOPHEN 650 MG: 325 TABLET ORAL at 01:00

## 2017-09-11 RX ADMIN — ASPIRIN 81 MG: 81 TABLET, COATED ORAL at 07:35

## 2017-09-11 RX ADMIN — HYDROCODONE BITARTRATE AND ACETAMINOPHEN 1 TABLET: 7.5; 325 TABLET ORAL at 12:14

## 2017-09-11 RX ADMIN — LEVOTHYROXINE SODIUM 88 MCG: 88 TABLET ORAL at 07:35

## 2017-09-11 RX ADMIN — CARVEDILOL 12.5 MG: 3.12 TABLET, FILM COATED ORAL at 20:04

## 2017-09-11 RX ADMIN — SODIUM CHLORIDE: 9 INJECTION, SOLUTION INTRAVENOUS at 00:04

## 2017-09-11 RX ADMIN — HYDROCODONE BITARTRATE AND ACETAMINOPHEN 1 TABLET: 7.5; 325 TABLET ORAL at 12:48

## 2017-09-11 RX ADMIN — CLOPIDOGREL 75 MG: 75 TABLET, FILM COATED ORAL at 07:32

## 2017-09-11 RX ADMIN — HEPARIN SODIUM 5000 UNITS: 5000 INJECTION, SOLUTION INTRAVENOUS; SUBCUTANEOUS at 20:04

## 2017-09-11 RX ADMIN — ACETAMINOPHEN 650 MG: 325 TABLET ORAL at 06:11

## 2017-09-11 ASSESSMENT — VISUAL ACUITY
OU: BASELINE;BLURRED VISION

## 2017-09-11 ASSESSMENT — ACTIVITIES OF DAILY LIVING (ADL)
WHICH_OF_THE_ABOVE_FUNCTIONAL_RISKS_HAD_A_RECENT_ONSET_OR_CHANGE?: COGNITION;COMMUNICATION/SPEECH
BATHING: 0-->INDEPENDENT
COMMUNICATION: 2-->DIFFICULTY UNDERSTANDING (NOT RELATED TO LANGUAGE BARRIER)
TOILETING: 0-->INDEPENDENT
TOILETING: 0-->INDEPENDENT
BATHING: 0-->INDEPENDENT
RETIRED_COMMUNICATION: 0-->UNDERSTANDS/COMMUNICATES WITHOUT DIFFICULTY
CHANGE_IN_FUNCTIONAL_STATUS_SINCE_ONSET_OF_CURRENT_ILLNESS/INJURY: YES
TRANSFERRING: 0-->INDEPENDENT
TRANSFERRING: 0-->INDEPENDENT
SWALLOWING: 0-->SWALLOWS FOODS/LIQUIDS WITHOUT DIFFICULTY
AMBULATION: 0-->INDEPENDENT
SWALLOWING: 0-->SWALLOWS FOODS/LIQUIDS WITHOUT DIFFICULTY
FALL_HISTORY_WITHIN_LAST_SIX_MONTHS: NO
DRESS: 0-->INDEPENDENT
COGNITION: 1 - ATTENTION OR MEMORY DEFICITS
AMBULATION: 0-->INDEPENDENT
DRESS: 0-->INDEPENDENT
EATING: 0-->INDEPENDENT
RETIRED_EATING: 0-->INDEPENDENT

## 2017-09-11 NOTE — CONSULTS
[facility]   PM&R CONSULT    Consulting Provider: Aries Huber  Reason for Consult: Assessment of rehabilitation   Location of Patient: 6 a  Date of Encounter: 9/11/2017       ASSESSMENT/PLAN:    Ms. Sowmya Gibbs is a Right handed 71 year old yo female with past medical history significant for hypertension, hyperlipidemia, diabetes, prior strokes resulting in left home in ominous hemianopsia, who presented on 9- with symptoms of difficulty speaking of more than 12 hours duration  Her initial NIH SS score was 2.  Head CT was done and showed a acute infarct in the anterior and middle left temporal lobe. Patient has history of chronic kidney disease and as such a CT angiogram angiogram could not be performed. She has undergone a T PARVEZ bubble study and an MRI and an MRI is pending.  On examination she is noted to be with expressive aphasia, some receptive aphasia as noted, and mild cognitive deficits as well as poor insight. Patient is quite frustrated with her inability to speak. She has limited comprehension.    In the earlier encounter when she was alone, her social history and support system could not be evaluated. We will do second attempt in the afternoon, when she was accompanied by her daughter and grandchildren. Has daughter reported that the patient eats herself lives in [place] along with another daughter. She was not aware that the patient was in the [facility]. She is not aware where the patient was living. Given    Given that she does not have an appropriate discharge plan would recommend transitional care unit placement for safety. However following are recommendations patient was adamant repeatedly saying no to another facility.  Despite patient's refusal, we continue to report recommended a transitional care unit for safety and ongoing management of aphasia.    HPI:        Sowmya Gibbs is a right-handed 71-year-old female with past medical history  of hypertension hyperlipidemia diabetes chronic kidney disease and type and time and stroke with multiple TIAs and CVAs. She presented on 9-10-17 she woke up with headaches and in ability to speak  Initial NIH SS was 2      CT head revealed acute infarcts in the anterior and middle left temporal lobe.    She has significant by a past medical history and has diabetic and previous stroke which resulted in left homonymous hemianopsia.  Further imaging studies including a bubble study and MRI are being planned.      PREVIOUS LEVEL OF FUNCTION   prior to this admission she was independent with all aspects of mobility and ADLs. Per her daughter she traveled extensively including to [place] as well as last month.      CURRENT FUNCTION   PT  Discussed  with physical therapy she was evaluated and discharged due to no further needs for physical therapy.    In occupational therapy she ambulated without an assistive device and no concern for ADL    CURRENT RN NEEDS   with the RN she has been ambulating with standby assist.    She has a CHO consistent diet.    The patient has been wanting to go outside the whole day. She is noted to be steady on her feet.    SOCIAL HISTORY/Home Setting/Support:   The social history is somewhat concerning. Per her daughter and lives with her sister in [place]. Her daughter states that she was unaware that the patient had moved to the [facility].    Patient's daughter states that she is not aware where the patient was living in [facility] she had recently been to [place]. A discharge plan is unclear.  Social History     Social History     Marital status:      Spouse name: N/A     Number of children: N/A     Years of education: N/A     Social History Main Topics     Smoking status: Former Smoker     Types: Cigarettes     Quit date: 2/4/2016     Smokeless tobacco: Never Used      Comment: patient smokes about 3 cigarettes per day     Alcohol use No     Drug use: No     Sexual activity: No      Other Topics Concern     Parent/Sibling W/ Cabg, Mi Or Angioplasty Before 65f 55m? Yes     mother and sisters     Social History Narrative    Surgical History  Return To Top     Status Surgery Time Frame Comment Record Date     Inactive  laminectomy  1976, 1986 4/14/2008      Inactive  Kidney surgery  6/23/04  Cancer, kidney partial removal  4/14/2008          --------------------------------------------------------------------------------    Food Allergy  Return To Top     Allergen Reaction Comment Record Date     * No known food allergies      9/28/2007          --------------------------------------------------------------------------------    Drug Allergy  Return To Top     Allergen Reaction Comment Record Date     TRICYCLIC ANTIDEPRESSANT    HYPER CRAZY  12/3/2008      Oxycodone  pruritis    12/3/2008      Valium      4/14/2008      SOMATROPIN  Could not walk or talk    4/14/2008      CODEINE      4/14/2008      DEMEROL      4/14/2008      SEPTRA      4/14/2008      Ceclor      4/14/2008      Penicillin      4/14/2008      Ultram  GI distress    4/14/2008          --------------------------------------------------------------------------------    Environment Allergy  Return To Top     Allergen Reaction Comment Record Date     * No known environmental allergies      9/28/2007          --------------------------------------------------------------------------------    Social History  Return To Top     Question Answer Comment Record Date     Marital status      9/28/2007      Emotional Abuse  No    9/7/2012      Exercise      9/28/2007      Caffeine  Yes    9/7/2012      Physical Abuse  No    9/7/2012      Sealtbelts      9/28/2007      Sexual Abuse  No    9/7/2012      Breast/Testicle Self Check      9/28/2007      Number of children  3    12/7/2007      Living arrangements  Apartment/Condo    9/28/2007      Number of children in household  0    9/28/2007      Number of adults in household  1     2007      Education level  College Graduate    2007      Employment  Currently employed    2007      Tobacco history  Quit less than 3 years ago    2007      Tobacco history  Currently smokes tobacco    2007      Number of years using tobacco  20    2007      Number of cigarettes/day  1  a 6-7 days when under stress  2007      Alcohol history  Never drinks alcohol    2007      Has the patient used marijuana?  No    2012      Has the patient used cocaine?  No    2012          --------------------------------------------------------------------------------    Medical History Return To Top     Status Diagnosis Time Frame Comment Record Date     Active (458.29) - C - Hypotension, iatrogenic      2012      Active (285.1) - C - Anemia, acute blood loss      2012      Active (440.21) - C - Intermittent claudication    right calf  2012      Active (440.0) - C - AORTIC ATHEROSCLEROSIS    with heavy calcifiaction  2012      Active (413.9) - C - Angina pectoris, NOS    home stressors difficulties in present living situation exacerbating this  2012      Active (435.9) - C - Transient ischemic attack, unspecified      2012      Active (414.01) - C - Coronary atherosclerosis, native coronary artery    STENTS X 2  AND 2012      Active (189.9) - C - Urinary cancer      2011      Active (276.8) - C - Hypokalemia      2011      Active (780.52) - C - Insomnia      2007      Active (401.1) - C - Hypertension, benign      2007      Active 305.1 Tobacco abuse      of lung cancer  2007      Active (244.9) - C - Hypothyroidism      2007      Active 346.00 Migraine, classic, not intractable      2007      Active 733.02 IDIOPATHIC OSTEOPOROSIS      2007      Active (780.79) - C - Fatigue      2007      Active 435.9 Transient ischemic attack, unspecified    4 small strokes with aphasia  transient  8/14/2007      Active (304.90) - C - Drug dependence, unspecified    darvon  8/14/2007      Active 189.9 Urinary cancer    small left kidney  8/14/2007      Active 346.80 MIGRAINE OT NOT INTRACTABLE      8/14/2007      Active (722.73) - C - Lumbar disc disorder w/myelopathy      8/14/2007      Active (311) - C - Depression      8/14/2007      Active (627.9) - C - Menopause      8/14/2007      Active (272.4) - C - Hyperlipidemia      8/14/2007      Inactive  (189.9) - C - Urinary cancer      4/22/2008      Inactive  (435.9) - C - Transient ischemic attack, unspecified      4/22/2008          --------------------------------------------------------------------------------    Medication History Return To Top         Isosorbide Mononitrate SR 30 mg 24 hr Tab, 1 Tablet(s), PO, daily, 30 days, 11 refills, for a total of 30, start on June 05, 2012 and end on May 30, 2013.     Aspirin 81 mg Tab, Delayed Release, 1 Tablet(s), PO, daily, 90 days, 3 refills, for a total of 90, start on April 06, 2012 and end on March 31, 2013.     Crestor 10 mg Tab, 1 Tablet(s), PO, daily, 30 days, 11 refills, for a total of 30, start on April 06, 2012 and end on March 31, 2013.     Levothyroxine 88 mcg Tab, 1 Tablet(s), PO, daily, 30 days, 11 refills, for a total of 30, start on April 06, 2012 and end on March 31, 2013.     Plavix 75 mg Tab, 1 Tablet(s), PO, daily, 30 days, 12 refills, for a total of 30, start on April 06, 2012 and end on April 30, 2013.     Potassium Chloride SR 10 mEq Cap, 1 Capsule(s), PO, daily, 30 days, 11 refills, for a total of 30, start on April 06, 2012, end on March 31, 2013 and every day.     Lyrica 50 mg Cap and Oral.     Vicodin 5 mg-500 mg Tab, 1 Tablet(s), PO, QID PRN, 30 days, for a total of 20, start on September 07, 2012, end on October 06, 2012 and prn migraine HA- #20 must last one month per Dr Knott.     Ambien 10 mg Tab, 1 Tablet(s), PO, QHS PRN, 30 days, 2 refills, for a total of 12, start  on September 07, 2012 and end on December 05, 2012.     Zolpidem 10 mg Tab, 1 Tablet(s), PO, QHS PRN, 30 days, for a total of 20, start on July 26, 2012, end on August 24, 2012 and TAKE ONE TABLET BY MOUTH AT BEDTIME AS NEEDED FOR SLEEP TO LAST 30 DAYS (Appended: Controlled substance eRx refill - RxReferenceNumber: [id number]).     Hydrocodone-Acetaminophen 5 mg-500 mg Cap, 1 Capsule(s), PO, QID PRN, 30 days, for a total of 30, start on July 26, 2012, end on August 24, 2012 and ONE PO QID PRN MIGRAINE TO LAST ONE MONTH prn migraine.     Zolpidem 10 mg Tab, Tablet(s), PO, for a total of 15, start on June 20, 2012, end on July 25, 2012, TAKE ONE TABLET BY MOUTH AT BEDTIME AS NEEDED FOR SLEEP TO LAST 30 DAYS (Appended: Controlled substance eRx refill - RxReferenceNumber: [id number]) and discontinued because: Refilled.     Hydrocodone-Acetaminophen 5 mg-500 mg Cap, 1 Capsule(s), PO, QID PRN, 30 days, 1 refill, for a total of 30, start on June 07, 2012, end on July 25, 2012, ONE PO QID PRN MIGRAINE TO LAST ONE MONTH prn migraine and discontinued because: Refilled.     Zolpidem 10 mg Tab, 1 Tablet(s), PO, QHS PRN, 30 days, for a total of 15, start on June 07, 2012, end on June 20, 2012, prn sleep, discontinued because: Discontinued and Response to an electronic refill request for a controlled substance (Schedule IV) request - RxReferenceNumber: [id number].     Zolpidem 10 mg Tab, 1 Tablet(s), PO, QHS PRN, 30 days, for a total of 15, start on May 10, 2012, end on June 04, 2012, prn sleep and discontinued because: Refilled.     Hydrocodone-Acetaminophen 5 mg-500 mg Cap, 1 Capsule(s), PO, QID PRN, 30 days, for a total of 20, start on May 10, 2012, end on June 04, 2012, ONE PO QID PRN MIGRAINE TO LAST ONE MONTH prn migraine and discontinued because: Refilled.     Hydrocodone-Acetaminophen 5 mg-500 mg Cap, 1 Capsule(s), PO, QID PRN, 30 days, for a total of 20, start on April 06, 2012, end on May 05, 2012, ONE PO QID PRN MIGRAINE TO  LAST ONE MONTH prn migraine and discontinued because: Refilled.     Zolpidem 10 mg Tab, 1 Tablet(s), PO, QHS PRN, 30 days, for a total of 15, start on April 06, 2012, end on May 05, 2012, prn sleep and discontinued because: Refilled.     Sertraline 25 mg Tab, 2 Tablet(s), PO, daily, 30 days, 3 refills, for a total of 60, start on April 06, 2012, end on June 05, 2012, 1/2 TABLET WITH FOOD X 15 DAYS THEN 1 TABLET DAILY WITH FOOD THEN 1.5 TABLETS DAILY X 15 DAYS THEN 2 TABLETS DAILY and discontinued because: Deleted.     Losartan 100 mg Tab, 1 Tablet(s), PO, daily, 30 days, 5 refills, for a total of 30, start on April 06, 2012, end on September 07, 2012 and discontinued because: Deleted.     Hydrocodone-Acetaminophen 5 mg-500 mg Cap, 1 Capsule(s), PO, QID PRN, 30 days, for a total of 20, start on March 06, 2012, end on April 04, 2012, ONE PO QID PRN MIGRAINE TO LAST ONE MONTH and discontinued because: Refilled.     Zolpidem 10 mg Tab, Tablet(s), PO, QHS PRN, 30 days, for a total of 12, start on February 15, 2012, end on March 15, 2012 and TAKE ONE TABLET BY MOUTH AT BEDTIME AS NEEDED FOR SLEEP (Appended: Controlled substance eRx refill - RxReferenceNumber: [id number]).     Hydrocodone-Acetaminophen 5 mg-500 mg Cap, 1 Capsule(s), PO, QID PRN, 30 days, for a total of 20, start on February 13, 2012, end on March 05, 2012, ONE PO QID PRN MIGRAINE TO LAST ONE MONTH and discontinued because: Refilled.     Zolpidem 10 mg Tab, Tablet(s), PO, for a total of 12, start on February 09, 2012, end on February 14, 2012, TAKE ONE TABLET BY MOUTH AT BEDTIME AS NEEDED FOR SLEEP (Appended: Controlled substance eRx refill - RxReferenceNumber: [id number]) and discontinued because: Refilled.     Zolpidem 10 mg Tab, Tablet(s), PO, for a total of 12, start on February 06, 2012, end on February 09, 2012, TAKE ONE TABLET BY MOUTH AT BEDTIME AS NEEDED - MUST LAST 30 DAYS (Appended: Controlled substance eRx refill - RxReferenceNumber: [id number]),  discontinued because: Discontinued and Response to an electronic refill request for a controlled substance (Schedule IV) request - RxReferenceNumber: [id number].     Zolpidem 10 mg Tab, Tablet(s), PO, for a total of 12, start on February 06, 2012, end on February 06, 2012, TAKE ONE TABLET BY MOUTH AT BEDTIME AS NEEDED FOR SLEEP (Appended: Controlled substance eRx refill - RxReferenceNumber: [id number]), discontinued because: Discontinued and Response to an electronic refill request for a controlled substance (Schedule IV) request - RxReferenceNumber: [id number].     Zolpidem 10 mg Tab, Tablet(s), PO, 30 days, for a total of 12, start on December 22, 2011, end on February 06, 2012, TAKE ONE TABLET BY MOUTH AT BEDTIME AS NEEDED OV NEEDED), discontinued because: Discontinued and Response to an electronic refill request for a controlled substance (Schedule IV) request - RxReferenceNumber: [id number].     Vicodin 5 mg-500 mg Tab, 1 Tablet(s), PO, QID PRN, 30 days, for a total of 20, start on December 20, 2011, end on January 18, 2012, prn migraine HA- #20 must last one month per Dr Knott and discontinued because: Refilled.     Zolpidem 10 mg Tab, 1 Tablet(s), PO, QHS PRN, 30 days, for a total of 12, start on November 22, 2011, end on December 21, 2011 and TAKE ONE TABLET BY MOUTH AT BEDTIME AS NEEDED--per Dr Knott, #12 to last one month.     Vicodin 5 mg-500 mg Tab, 1 Tablet(s), PO, QID PRN, 30 days, for a total of 20, start on November 22, 2011, end on December 19, 2011, prn migraine HA- #20 must last one month per Dr Knott and discontinued because: Refilled.     Zolpidem 10 mg Tab, 1 Tablet(s), PO, QHS PRN, 30 days, for a total of 12, start on October 24, 2011, end on November 22, 2011, TAKE ONE TABLET BY MOUTH AT BEDTIME AS NEEDED--per Dr Knott, #12 to last one month, discontinued because: Discontinued and Response to an electronic refill request for a controlled substance (Schedule IV) request - RxReferenceNumber:  [id number].     Vicodin 5 mg-500 mg Tab, 1 Tablet(s), PO, QID PRN, 30 days, for a total of 20, start on October 24, 2011, end on November 21, 2011, prn migraine HA- #20 must last one month per Dr Knott and discontinued because: Refilled.     Vicodin 5 mg-500 mg Tab, 1 Tablet(s), PO, QID PRN, 28 days, for a total of 20, start on September 26, 2011, end on October 23, 2011, prn migraine HA- #20 must last one month per Dr Sharma and discontinued because: Refilled.     Zolpidem 10 mg Tab, 1 Tablet(s), PO, QHS PRN, 12 days, for a total of 12, start on September 24, 2011, end on October 24, 2011, TAKE ONE TABLET BY MOUTH AT BEDTIME AS NEEDED (Appended: Controlled substance eRx refill - RxReferenceNumber: [id number]), discontinued because: Discontinued and Response to an electronic refill request for a controlled substance (Schedule IV) request - RxReferenceNumber: [id number].     Zolpidem 10 mg Tab, Tablet(s), PO, for a total of 12, start on August 25, 2011, end on September 24, 2011, TAKE ONE TABLET BY MOUTH AT BEDTIME AS NEEDED (Appended: Controlled substance eRx refill - RxReferenceNumber: [id number]), discontinued because: Discontinued and Response to an electronic refill request for a controlled substance (Schedule IV) request - RxReferenceNumber: [id number].     Zolpidem 10 mg Tab, Tablet(s), PO, for a total of 12, start on August 25, 2011, end on December 21, 2011, TAKE ONE TABLET BY MOUTH AT BEDTIME AS NEEDED (Appended: Controlled substance eRx refill - RxReferenceNumber: [id number]) and discontinued because: Refilled.     Vicodin 5 mg-500 mg Tab, 1 Tablet(s), PO, QID PRN, 5 days, for a total of 20, start on August 25, 2011, end on August 29, 2011, prn migraine HA- #20 must last one month and discontinued because: Refilled.     Zolpidem 10 mg Tab, 1 Tablet(s), PO, QHS PRN, 30 days, for a total of 12, start on August 25, 2011, end on August 25, 2011, discontinued because: Discontinued and Response to an electronic refill request  for a controlled substance (Schedule IV) request - RxReferenceNumber: [id number].     Zolpidem 10 mg Tab, 1 Tablet(s), PO, QHS PRN, 30 days, for a total of 12, start on July 27, 2011, end on August 23, 2011 and discontinued because: Refilled.     Vicodin 5 mg-500 mg Tab, 1 Tablet(s), PO, QID PRN, 30 days, for a total of 20, start on July 27, 2011, end on August 24, 2011, prn migraine HA- #20 must last one month and discontinued because: Refilled.     Benicar 20 mg Tab, 1/2 Tablet(s), PO, daily, 30 days, 1 refill, for a total of 15, start on June 23, 2011 and end on August 21, 2011.     Vicodin 5 mg-500 mg Tab, 1 Tablet(s), PO, QID PRN, 30 days, for a total of 20, start on June 23, 2011, end on July 22, 2011, to last 3 months through July 2008 and discontinued because: Refilled.     Ambien 10 mg Tab, 1 Tablet(s), PO, QHS PRN, 30 days, for a total of 12, start on June 23, 2011, end on July 22, 2011 and discontinued because: Refilled.     Vicodin 5 mg-500 mg Tab, 1 Tablet(s), PO, QID PRN, 90 days, for a total of 60, start on May 09, 2011, end on June 22, 2011, to last 3 months through July 2008 and discontinued because: Refilled.     Ambien 10 mg Tab, 1 Tablet(s), PO, QHS PRN, 90 days, for a total of 30, start on May 09, 2011, end on June 22, 2011 and discontinued because: Refilled.     Ambien 10 mg Tab, 1 Tablet(s), PO, QHS PRN, 30 days, for a total of 21, start on April 15, 2011, end on May 08, 2011 and discontinued because: Refilled.     Ambien 10 mg Tab, 1 Tablet(s), PO, QHS PRN, 30 days, 2 refills, for a total of 21, start on March 02, 2011, end on April 14, 2011 and discontinued because: Refilled.     Crestor 10 mg Tab, 1 Tablet(s), PO, daily, 30 days, 11 refills, for a total of 30, start on February 25, 2011, end on February 19, 2012 and discontinued because: Refilled.     Levothroid 88 mcg Tab, 1 Tablet(s), PO, daily, 30 days, 11 refills, for a total of 30, start on February 25, 2011 and end on February 19, 2012.      Vicodin 5 mg-500 mg Tab, 1 Tablet(s), PO, QID PRN, 2 refills, for a total of 20, start on February 25, 2011 and Must last for one month. OK per [facility] for #20, 0 RF's.     Vicodin 5 mg-500 mg Tab, 1 Tablet(s), PO, QID PRN, for a total of 20, start on January 25, 2011, Must last for one month. OK per [facility] for #20, 0 RF's and discontinued because: Refilled.     Vicodin 5 mg-500 mg Tab, 1 Tablet(s), PO, QID PRN, for a total of 60, start on November 01, 2010, Must last for 3 months. OK #60 with not RF's per [facility]. and discontinued because: Refilled.     Vicodin 5 mg-500 mg Tab, 1 Tablet(s), PO, PRN, 30 days, 2 refills, for a total of 20, start on July 29, 2010 and one po 4 x daily prn migraine.     Ambien 10 mg Tab, 1 Tablet(s), PO, QHS, 30 days, 2 refills, for a total of 30, start on July 29, 2010, end on October 26, 2010, 4/26/10 pt needs OV per [facility] and discontinued because: Refilled.     Crestor 10 mg Tab, 1 Tablet(s), PO, daily, 30 days, 11 refills, for a total of 30, start on July 29, 2010, end on February 24, 2011 and discontinued because: Refilled.     Levothroid 88 mcg Tab, 1 Tablet(s), PO, daily, 90 days, 3 refills, for a total of 90, start on July 29, 2010, end on February 24, 2011 and discontinued because: Refilled.     Vicodin 5 mg-500 mg Tab, 1 Tablet(s), PO, BID PRN, 30 days, for a total of 60, start on June 28, 2010, end on July 27, 2010 and discontinued because: Refilled.     Ambien 10 mg Tab, 1 Tablet(s), PO, QHS, for a total of 21, start on April 26, 2010, 4/26/10 pt needs OV per [facility] and discontinued because: Refilled.     Simvastatin 40 mg Tab, 1/2 Tablet(s), PO, daily, 30 days, 1 refill, for a total of 15, start on January 18, 2010, end on March 18, 2010 and NEED LDL AND SGOT WHEN ON THIS ONE MONTH.     Levothroid 88 mcg Tab, 1 Tablet(s), PO, daily, 90 days, for a total of 90, start on January 15, 2010, end on April 14, 2010 and discontinued because: Refilled.     Ambien 10 mg Tab, 1 Tablet(s), PO, QHS,  30 days, for a total of 30, start on January 15, 2010, end on 2010 and discontinued because: Refilled.     Vicodin 5 mg-500 mg Tab, 1 Tablet(s), PO, BID PRN, 30 days, 2 refills, for a total of 60, start on January 15, 2010, end on 2010 and discontinued because: Refilled.     Crestor 10 mg Tab, 1 Tablet(s), PO, daily, 30 days, 11 refills, for a total of 30, start on January 15, 2010, end on 2010 and discontinued because: Refilled.     Vicodin 5 mg-500 mg Tab, 1 Tablet(s), PO, PRN, 30 days, 2 refills, for a total of 20, start on 2009, end on 2010, one po 4 x daily prn migraine and discontinued because: Refilled.     Ambien 10 mg Tab, 1 Tablet(s), PO, QHS, 21 days, for a total of 21, start on 2009, end on 2009 and NEIDA OMALLEY WILL CALL IN.     Levothroid 88 mcg Tab, 1 Tablet(s), PO, daily, 30 days, for a total of 30, start on 2009, end on 2010, NEED OFFICE VISIT & LABS  [facility] and discontinued     --------------------------------------------------------------------------------    Family History  Return To Top     Status Relationship Disease Comment Record Date     Alive Sister  Uterine cancer    2008         Father  Myocardial infarction  Age of death 72  2008         Paternal grandfather  Renal failure  Age of death 52  2008         Mother  Septicemia  Age of death 63  2008                 Past Medical History:  Past Medical History:   Diagnosis Date     Arthritis      CAD (coronary artery disease) 3/26/2014     Coronary artery disease      Coronary artery disease involving native coronary artery without angina pectoris 3/26/2015     Hypertension      Malignant neoplasm (H)      Thyroid disease      Type 2 diabetes mellitus, controlled, with renal complications (H) 2015     Type 2 diabetes, HbA1C goal < 8% (H) 2015     Unspecified cerebral  artery occlusion with cerebral infarction            Current Medications:  Current Facility-Administered Medications   Medication     lidocaine 1 % 1 mL     lidocaine (LMX4) kit     sodium chloride (PF) 0.9% PF flush 3 mL     sodium chloride (PF) 0.9% PF flush 3 mL     metoclopramide (REGLAN) injection 10 mg     carvedilol (COREG) tablet 12.5 mg     clopidogrel (PLAVIX) tablet 75 mg     levothyroxine (SYNTHROID/LEVOTHROID) tablet 88 mcg     rosuvastatin (CRESTOR) tablet 10 mg     SODIUM BICARBONATE PO 20 mg     naloxone (NARCAN) injection 0.1-0.4 mg     heparin sodium PF injection 5,000 Units     0.9% sodium chloride infusion     aspirin EC EC tablet 81 mg    Or     aspirin suspension 81 mg     labetalol (NORMODYNE/TRANDATE) injection 10 mg     acetaminophen (TYLENOL) tablet 650 mg    Or     acetaminophen (TYLENOL) solution 650 mg    Or     acetaminophen (TYLENOL) Suppository 650 mg     ondansetron (ZOFRAN-ODT) ODT tab 4 mg    Or     ondansetron (ZOFRAN) injection 4 mg         Review of Systems:  Total of ten systems reviewed, pertinent positives and negatives as follows  Instability with standing and walking.    Feels generally fatigued  Denies weakness  Denies any tingling or numbness  No problems with bladder.   No chest pain. No cough or SOB.  No visual changes.   No headache or photophobia.   No nausea, or abdominal pain.  No joint pain, muscle pain or swelling.  Remainder of the review of the systems was negative.        Labs   Lab Results   Component Value Date    WBC 9.6 09/10/2017    HGB 14.5 09/10/2017    HCT 42.4 09/10/2017    MCV 93 09/10/2017     09/11/2017     Lab Results   Component Value Date     09/10/2017    POTASSIUM 4.5 09/10/2017    CHLORIDE 113 (H) 09/10/2017    CO2 23 09/10/2017     (H) 09/10/2017     Lab Results   Component Value Date    GFRESTIMATED 32 (L) 09/10/2017    GFRESTBLACK 39 (L) 09/10/2017     Lab Results   Component Value Date    AST 16.0 09/07/2012    ALT 24  09/04/2015    ALKPHOS 135 06/05/2012    BILITOTAL 0.3 04/06/2012    BILICONJ 0.0 12/19/2007     Lab Results   Component Value Date    INR 1.2 (H) 09/10/2017     Lab Results   Component Value Date    BUN 21 09/10/2017    CR 1.48 (H) 09/10/2017         ON EXAMINATION:  Vitals:    09/11/17 0343 09/11/17 0543 09/11/17 0700 09/11/17 0732   BP: 134/66 138/68 136/71    BP Location: Right arm Right arm Right arm    Pulse:  63 58 54   Resp: 18 16 16    Temp: 98  F (36.7  C) 97.3  F (36.3  C) 97.7  F (36.5  C)    TempSrc: Oral Oral Oral    SpO2:  97% 98%    Weight:           Physical Exam:  Blood pressure 136/71, pulse 54, temperature 97.7  F (36.5  C), temperature source Oral, resp. rate 16, weight 55.7 kg (122 lb 12.7 oz), last menstrual period 01/25/2003, SpO2 98 %, not currently breastfeeding.    GEN: NAD, seated/lying comfortably in bed  She/He is alert, appropriate, cooperative  Word finding difficulty  Comprehension is impaired   HEENT: NCAT  MSK: full active and passive ROM at all major joints of the bilaterally upper and lower extremities  No muscle atrophy noted  ABD: soft, non tender, pos BS  NEURO:   CRANIAL NERVES: Discs flat. Pupils equal, round and reactive to light.  Extraocular movements full. Visual fields full. Face moves symmetrically.  Tongue midline. Hearing intact to finger rubbing.    Transfer are independent   Gait is without an assistive device   Poor safety awareness   SKIN: no rashes or lesions noted.   EXT: no edema bilaterally, chronic stasis changes, no ulcers.         Thank you for the consult. Please call for any questions. Pager number [phone]     Gisselle Cid       Total of 70 min spent in this encounter, more than 50% in counseling and coordination.

## 2017-09-11 NOTE — ED PROVIDER NOTES
"    [facility] EMERGENCY DEPARTMENT ([facility])  9/10/17   History     Chief Complaint   Patient presents with     Aphasia     The history is provided by the patient and medical records.     Sowmya Gibbs is a 71 year old female who presents with expressive aphasia that started this morning. She has a complex past medical history including distant history of TIAs and CVAs that manifested as aphasia as well as migraine headaches with aphasia. She also has a history of type II diabetes, chronic kidney disease (with kidney injury and creatinine as high as 5.1 in 2015), hypertension, hyperlipidemia, and chronic back pain. History difficult to obtain as patient is having speech difficulty. She was brought here by family and states that symptoms started this morning with difficulty speaking. With much effort and difficulty she states that she has had \"3 strikes\" in the past. It is unclear what her last known normal is, as patient isn't able to verbally state this. She is unable to answer if she woke up with these symptoms today.     Epic records reviewed, including outside record from [facility] scanned into the system. Patient has a history of hospitalization from 9/6 to 9/12/14 for acute ischemic stroke. At that time she had sudden onset of aphasia. Unfortunately the imaging reports are not available, just discharge summary.   The aphasia resolved while she was inpatient. She has been maintained on aspirin and plavix. Patient has left homonymous hemianopsia as a result from her strokes. 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     I have reviewed the Medications, Allergies, Past Medical and Surgical History, and Social History in the [employer] system.  PAST MEDICAL HISTORY:   Past Medical History:   Diagnosis Date     Arthritis      CAD (coronary artery disease) 3/26/2014     Coronary artery disease      Coronary artery disease involving native coronary artery without " angina pectoris 3/26/2015     Hypertension      Malignant neoplasm (H)      Thyroid disease      Type 2 diabetes mellitus, controlled, with renal complications (H) 1/5/2015     Type 2 diabetes, HbA1C goal < 8% (H) 1/5/2015     Unspecified cerebral artery occlusion with cerebral infarction        PAST SURGICAL HISTORY:   Past Surgical History:   Procedure Laterality Date     KIDNEY SURGERY  06/23/2004    cancer, kidney partial removal       FAMILY HISTORY:   Family History   Problem Relation Age of Onset     HEART DISEASE Mother      Parkinsonism Father      HEART DISEASE Father      HEART DISEASE Sister      Family History Negative Other      females heart disease       SOCIAL HISTORY:   Social History   Substance Use Topics     Smoking status: Former Smoker     Types: Cigarettes     Quit date: 2/4/2016     Smokeless tobacco: Never Used      Comment: patient smokes about 3 cigarettes per day     Alcohol use No       Patient's Medications   New Prescriptions    No medications on file   Previous Medications    ALPHA-LIPOIC ACID 200 MG TABS    Take 1 tablet by mouth 2 times daily    ASPIRIN NOT PRESCRIBED, INTENTIONAL,    1 each daily Antiplatelet medication not prescribed intentionally due to Current thienopryridine therapy    BLOOD GLUCOSE (ZEE CONTOUR) TEST STRIP    Use to test blood sugars TWICE DAILY  times daily or as directed.    BLOOD GLUCOSE (ZEE MICROLET 2) LANCING DEVICE    Use to test blood sugars TWICE DAILY  times daily or as directed.    BLOOD GLUCOSE CALIBRATION (ZEE CONTOUR) NORMAL SOLUTION    Use to calibrate blood glucose monitor as directed.    BLOOD GLUCOSE MONITORING (ZEE CONTOUR MONITOR) METER DEVICE KIT    Use to test blood sugars TWICE DAILY  times daily or as directed.  MAY HAVE GENERIC CONTROL SOLUTIONS, STRIPS TWICE DAILY AND LANCETS   NEWLY DISCOVERED DIABETES 2 GOAL 8% 250.00  MAY HAVE GENERIC KIT AS WELL    BLOOD GLUCOSE MONITORING (ONE TOUCH DELICA) LANCETS    Use to test blood  sugars TWICE DAILY  times daily or as directed.  GENERIC OK    CARVEDILOL (COREG) 12.5 MG TABLET    Take 1 tablet (12.5 mg) by mouth 2 times daily (with meals)    CHOLECALCIFEROL (VITAMIN D) 2000 UNITS CAPS    Take 1 tablet by mouth daily    CINNAMON (HM CINNAMON) 500 MG CAPS    Take 2 capfuls by mouth 3 times daily    CLOPIDOGREL (PLAVIX) 75 MG TABLET    Take 1 tablet (75 mg) by mouth daily    FENOFIBRATE 145 MG TABLET    Take 1 tablet (145 mg) by mouth daily    FISH OIL    3 capsules every morning.    HYDROCODONE-ACETAMINOPHEN (NORCO) 7.5-325 MG PER TABLET    Take 1 tablet by mouth every 6 hours as needed for moderate to severe pain    LEVOTHYROXINE (SYNTHROID, LEVOTHROID) 88 MCG TABLET    Take 1 tablet (88 mcg) by mouth daily    NITROGLYCERIN (NITROSTAT) 0.4 MG SL TABLET    Place 1 tablet (0.4 mg) under the tongue every 5 minutes as needed for chest pain    ROSUVASTATIN (CRESTOR) 10 MG TABLET    Take 1 tablet (10 mg) by mouth daily    SODIUM BICARBONATE PO    Take 20 mg by mouth 3 times daily    ZOLPIDEM (AMBIEN) 5 MG TABLET    TAKE ONE TABLET BY MOUTH ONCE DAILY AT BEDTIME AS NEEDED   Modified Medications    No medications on file   Discontinued Medications    No medications on file          Allergies   Allergen Reactions     Ceclor [Cefaclor Monohydrate]      Codeine      Demerol      Oxycodone Itching     Penicillins      Septra [Sulfamethoxazole W-Trimethoprim]      Somatropin      Could not walk or talk     Tramadol GI Disturbance     Ultram     Tricyclic Antidepressants      Hyper crazy     Valium         Review of Systems   Neurological: Positive for speech difficulty.       Physical Exam    /79  Pulse 78  Temp 97.5  F (36.4  C) (Oral)  Resp 17  Wt 55.7 kg (122 lb 12.7 oz)  LMP 01/25/2003  SpO2 97%  BMI 20.43 kg/m2   Physical Exam   Constitutional: She is oriented to person, place, and time. No distress.   HENT:   Head: Atraumatic.   Mouth/Throat: Oropharynx is clear and moist. No  oropharyngeal exudate.   Eyes: Pupils are equal, round, and reactive to light. No scleral icterus.   Cardiovascular: Normal heart sounds and intact distal pulses.    Pulmonary/Chest: Breath sounds normal. No respiratory distress.   Abdominal: Soft. Bowel sounds are normal. There is no tenderness.   Musculoskeletal: She exhibits no edema or tenderness.   Neurological: She is alert and oriented to person, place, and time. She exhibits normal muscle tone. Coordination normal.   + expressive aphasia    Skin: Skin is warm. No rash noted. She is not diaphoretic.       ED Course     ED Course     Procedures             Critical Care time:  none             Labs Ordered and Resulted from Time of ED Arrival Up to the Time of Departure from the ED - No data to display         Assessments & Plan (with Medical Decision Making)     71 yof with h/o prior CVA and migraines, c/o HA and expressive aphasia. Stroke activated in ED. IV established, labs sent and remarkable for PRUDENCE, CT head obtained revealing CVA, patient admitted to Neuro stroke service.     I have reviewed the nursing notes.    I have reviewed the findings, diagnosis, plan and need for follow up with the patient.    New Prescriptions    No medications on file       I, Suri Ahumada, am serving as a trained medical scribe to document services personally performed by Angie Orr MD, based on the provider's statements to me on September 10, 2017.  This document has been checked and approved by the attending provider.    I, Angie Orr MD, was physically present and have reviewed and verified the accuracy of this note documented by Suri Ahumada, medical scribe.       9/10/2017   [facility], EMERGENCY DEPARTMENT     Angie Orr MD  09/12/17 [id number]

## 2017-09-11 NOTE — PLAN OF CARE
"Problem: Discharge Planning  Goal: Discharge Planning (Adult, OB, Behavioral, Peds)  /11/17 7025     Cherry Moctezuma-Registered Nurse (Nursing)  Patient and family seen at bedside. Pt. Experienced sever aphasia from stroke. Unable to assess what she understands. Became verbally upset at times. Family expressed concern about patients plan for discharge since she has nowhere home at this time. Had lived with daughter in [place] before coming back to [place]. where she was staying \"with a meño\" according to her family.  Family would like to take to stroke team as soon as possible about rehab plans.           "

## 2017-09-11 NOTE — PLAN OF CARE
"Problem: Goal Outcome Summary  Goal: Goal Outcome Summary     SLP 6A: Speech-Language evaluation completed per MD order. Exam limited by pt frustration with current communication skills. Pt presents with moderate-severe global aphasia in the setting of new stroke. Pt largely frustrated during exam, asking \"why\" and saying expletives. Pt with success writing name, where she was from and significant others name with whiteboard. Current level of skills marked with word finding deficits, perseveration, paraphasias, neologisms, and impaired comprehension. Recommend daily SLP tx for aphasia while IP with ongoing OP SLP at discharge to maximize communication effectiveness.      "

## 2017-09-11 NOTE — ED NOTES
Pt presents to triage with friend unable to speak and complaining of a migraine. Pt's friend states pt has had 3 strokes in the past. Pt brought back to trauma bay 1. Dr. Orr assessed pt and stroke code called at 1936. Pt unable to verbalize when onset of symptoms were but states it was sometime this morning. No other deficits noticed.

## 2017-09-11 NOTE — PROGRESS NOTES
[facility], [place]   Neurology Daily Note  Sowmya Gibbs  [id number]  09/11/2017    Subjective: Patient expresses frustration with aphasia, has difficulty expressing needs. Initially was expressing wishes to leave but was convinced to stay for further work up after condition was explained to her. Reports that she is still complaining of headache and is requesting pain medication for it. Daughter reports that she has also noticed personality change in patient in addition to speech difficulty.    Objective   /78  Pulse 64  Temp 97.9  F (36.6  C) (Oral)  Resp 16  Wt 55.7 kg (122 lb 12.7 oz)  LMP 01/25/2003  SpO2 95%  BMI 20.43 kg/m2  General: Adult, cooperative   HEENT: NC/AT, no icterus, pink and moist  Cardiac: RRR no M  Chest: CTAB no w/c/r  Abdomen: S/NT/ND  Extremities: No LE swelling.  Skin: No rash or lesion.   Psych: normal mood and affect  Neuro:  Mental status: Awake, alert, attentive, oriented to place. Speech is aphasic, able to follow simple commands. Difficulty with 2-step or more complex commands. Impaired repetition. No dysarthria.  Cranial nerves: CN2-12 tested and no significant findings appreciated. Eyes conjugate, PERRLA, EOMI, visual fields full, face symmetric, facial sensation intact, shoulder shrug strong, palate rise symmetric, tongue/uvula midline, hearing intact to conversation.   Motor: Tone normal. 5/5 strength in all 4 extremities. No atrophy or twitches. No pronator drift present.    Reflexes: Reflexic and symmetric biceps, brachioradialis, patellar, and achilles. No crossed adductors or spread. Toes down-going.  Sensory: Intact to LT  x 4 extremities  Coordination: FNF no dysmetria    Investigations         Lab Results   Component Value Date    WBC 9.6 09/10/2017    HGB 14.5 09/10/2017    HCT 42.4 09/10/2017    MCV 93 09/10/2017     09/11/2017     Lab Results   Component Value Date    CR 1.48 (H) 09/10/2017     Lab Results   Component  Value Date    DD 0.8 (H) 09/11/2017     Lab Results   Component Value Date     09/10/2017    POTASSIUM 4.5 09/10/2017    CHLORIDE 113 (H) 09/10/2017    CO2 23 09/10/2017     (H) 09/10/2017     Lab Results   Component Value Date    SED 43 (H) 09/11/2017     Hemoglobin A1C   Date Value Ref Range Status   09/04/2015 5.3 4.3 - 6.0 % Final   03/20/2015 5.7 4.3 - 6.0 % Final   08/06/2014 6.2 (H) 4.3 - 6.0 % Final   11/06/2007 5.4 4.3 - 6.0 % Final   06/28/2007 5.3 4.3 - 6.0 % Final     Lab Results   Component Value Date    LDL 88 09/11/2017       Assessment and Plan   Sowmya Gibbs is a 71 year old year old right handed female with multiple stroke risk factors including HTN, DMII, HLD, previous strokes and a history of RCC s/p partial nephrectomy, CKD who presented on 9/10/2017 with an acute onset aphasia found to have anterior and middle left temporal lobe acute infarcts.     #Anterior and middle left temporal lobe acute infarcts  - At this point, etiology of her acute ischemic stroke and previous CVA's remains unclear  - MRI/MRA today (need to evaluate vessel imaging looking for large artery atherosclerosis, though less likely as strokes do not seem to follow a vascular territory)  - TTE with bubble was done looking for cardioembolic sources - no cardiac source for embolus identified with normal LV function and EF of 55-60% and no RWMA.  - ESR was elevated at 43  - CRP was wnl, d-dimer minimally/non-significantly elevated at 0.8  - Anti-cardiolipin negative, beta-2 glycoprotein and LA pending  - Continue speech therapy  - PT/OT deferred as able to walk with good balance and no concern for impairment in ADLs  - PM&R assessment  - Continue ASA + Plavix  - Consider CT C/A/P if stroke work-up unrevealing, investigating for underlying malignancy causing hypercoagulable state    #Headaches  - Patient was on hydrocodone-acetaminophen 7.5-325mg PRN PTA, to continue    #HTN  - Continue carvidelol 12.5mg  -  Labetalol 10mg PRN    #HLD  - Lipid profile: HDL 28 (L),  (H), LDL 88  - Continue Crestor 10mg     #DMII  - POC glucose range 100-158  - Continue consistent carbs and glucose checks  - A1c pending  - Currently not on insulin    #Hypothyroidism  - Continue L-thyroxine 88mcg    #CKD  - Cr level is 1.6 today (ranged between 1.6 - 1.8 in 2015)  - Was on NaHCO3 at home, discontinued today  - IV fluids 75ml/hr   - Repeat BMP in AM    FEN: 75ml/hr  PPx: Heparin 5,000 U  Code: FULL  Dispo: Pending MRI/MRA work-up, will possible need CT C/A/P if brain imaging unrevealing. I suspect the patient will be discharged tomorrow after work up.    Patient's condition was explained and discussed with patient and family (daughters).    Patient was seen and discussed with Stroke fellow Pacheco Vail and with the staff, Dr. Dylan Paredes.    Anat Duncan  Neurology Resident, PGY-1  P

## 2017-09-11 NOTE — PLAN OF CARE
Problem: Goal Outcome Summary  Goal: Goal Outcome Summary  OT/6A: Defer- Per chart review and discussion with interdisciplinary team, pt with no current OT needs. Pt up ambulating with RN staff, no concern for ADL performance, cognition or vision at this time. Primary concern is language deficits. Will defer OT evaluation and complete orders.

## 2017-09-11 NOTE — PHARMACY
Pharmacy Stroke Code Response  Pharmacist responded as part of the Stroke Code Team activation to patient care area ED.  The Stroke Team determined that the patient was not a candidate for IV alteplase therapy and the pharmacy team was dismissed at 1947.     Jennifer Lozano, UrielD Resident

## 2017-09-11 NOTE — PROGRESS NOTES
Arrival to Stroke Code: 1938    Stroke Team escalate/de-escalate interventions: Not a candidate for TPA, To  radiology to get ct SCAN    ED/Bedside Nurse providing handoff: Nori    Time left for CT:1945    Time Returned to ED/Unit: 1950    ED/Bedside Nurse given handoff to & Time: 1955 handoff to nori, did stroke eval together

## 2017-09-11 NOTE — PLAN OF CARE
Problem: Goal Outcome Summary  Goal: Goal Outcome Summary  Outcome: No Change  VSS. Pt. refuses most cares including glucose checks. Refused MRI as she wants IV sedation, transport took pt before PO atavan given . Neuros unchanged. Expressive aphasia. Pt. has slight short term memory deficit. CHO consistent diet- does not like [facility] food. Up with assist of 1. Family at bedside- pt wanted to go outside whole day and then wanted to leave AMA. MRI rescheduled wityh IV atavan for evenings. Voids spontaneously, steady on feet. MD said LUPIS seay order for MRI.

## 2017-09-11 NOTE — H&P
[facility], [place]      Neurology Stroke Admission    Patient Name: Sowmya Gibbs  : 1945 MRN#: [id number]    STROKE DATA     Stroke Code:  Time called:  09/10/2017 1938  Time patient seen:  09/10/2017 1940  Onset of symptoms:  09/10/2017 0700  Last known normal (pt's baseline):  17 unclear bedtime  Head CT read by Cecile Chris at:  09/10/2017 1955            TPA treatment:  Not given due to outside the time window.     National Institutes of Health Stroke Scale (at presentation)  NIHSS done at:  time patient seen      Score   Level of consciousness: (0)   Alert, keenly responsive   LOC questions: (1)   Answers one question correctly   LOC commands: (0)   Performs both tasks correctly   Best gaze: (0)   Normal   Visual: (0)   No visual loss   Facial palsy: (0)   Normal symmetrical movements   Motor arm (left): (0)   No drift   Motor arm (right): (0)   No drift   Motor leg (left): (0)   No drift   Motor leg (right): (0)   No drift   Limb ataxia: (0)   Absent   Sensory: (0)   Normal- no sensory loss   Best language: (1)   Mild to moderate aphasia   Dysarthria: (0)   Normal   Extinction and inattention: (0)   No abnormality       NIHSS Total Score: 2        Dysphagia Screen  Time of screenin/10/2017 2030  Screening results: Passed screening, no dysarthria - Regular Diet with thin liquids     ASSESSMENT & PLAN BY PROBLEM     Work-up for Ischemic Stroke (no TPA): multiple stroke risk factors including HTN, HLD, DM2, hx smoking, and prior strokes. Interestingly strokes started in  (pt age ~55), raises suspicion for CADASIL or Fabry's. Hx migraines, depression, overdoses, renal cancer as well. However, pt also has hx of MI and claudication; may just have atherosclerosis due to risk factors.  Pt was on plavix alone PTA (has had stroke on ASA/Plavix combo before). Has had angiogram for vasculitis workup, was negative. Pt admitted currently for acute onset speech  difficulty, presents 12 hrs after sx onset, concurrent HA. DDx includes recrudescence, complex migraine, acute stroke. CT w/ acute stroke, left  Hemisphere, correlating w/ symptoms. CTA not done 2/2 renal failure. Not a POINT or SHINE candidate.    Acute Ischemic Stroke (without tPA) Plan  - Admit to Neurology  - Neurochecks  - Permissive HTN; labetalol PRN for SBP > 220  - Euthermia, Euglycemia  - Already on plavix; added ASA 81 for now  - Statin: continue Crestor  - MRI brain: Not ordered, need to discuss +/- contrast. Will need MRA (TOF vs contrasted)  - TTE with Bubble Study  - Telemetry, EKG  - Bedside Glucose Monitoring  - A1c, Lipid Panel, Troponin x 3  - PT/OT/SLP  - PM&R  - Stroke Education  - Depression Screen  - Apnea Screen    During initial physical assessment, the plan of care was discussed and developed with patient.  Plan of care includes: admission, MRI tomorrow, stroke workup. I did also speak with her daughter Celina over the phone, discussed CT concerns, high suspicion for stroke, etc. She would like to be updated tomorrow. Pt has her phone # in her cell phone.    Patient was admitted via [facility] ED ([place]).    The patient will be admitted to the Neuro Critical Care/Stroke team..     Other Medical Problems:  DM2: Highest A1c was 6.2 in 2014, otherwise has been in 5's, last check was 2015.  - Consistent carbs and Glc checks; no insulin ordered for now.  - A1c    HLD: Last  in 2015. On Crestor  - Continue Crestor 10 mg    HTN:   Continue carvedilol 12.5 mg BID starting in the morning    Hx CKD: Cr 1.6 on admission. On K and NaHCO3 at home, not sure why the bicarb. Continue.  - NaCl 0.9% at 75/hr    Hx overdose: No ambien, no narcotics.        Fluids/Electrolytes/Nutrition  0.9% sodium chloride @ 75 mL/hr  Avoid hypotonic fluids.    Nutrition:   Active Diet Order      Combination Diet Regular Diet Adult; 0653-1218 Calories: Moderate Consistent CHO (4-6 CHO units/meal)    Prophylaxis             For VTE Prevention:  - heparin SQ    For Acid Suppression:  - GI prophylaxis is not indicated    Code Status  Not discussed yet (due to aphasia, pt cannot fully participate in conversation)    HPI  Sowmya Gibbs is a 71 year old female with PMHx CKD, partial nephrectomy 2/2 renal cancer, MI, claudication, DM2, HTN, HLD, complex migraines, multiple strokes, presents w/ acute onset aphasia starting this morning. Hx obtained with difficulty from the patient (who has expressive aphasia), some hx from her daughter Celina. Pt states she woke up this morning with a headache, and then noticed she was unable to speak clearly. Noticed speech difficulties at 7 am. Pt lives alone; it's not clear if she was normal prior to 7 am, but when asked if she was, she indicated she woke up w/ a headache.  She has no other symptoms at all. She thought she was having a stroke. SHe has a hx of stroke causing aphasia, and this was just like her prior episode.  She seems to try to say she took either percocet or vicodin for her HA today (has norco Rx).    Apparently MRI in 2007 already had areas of prior infarct in R cerebellum, L insula, L cauda and L paraventricular.  Per chart review, 2001 had acute onset aphasia (presume stroke), 2013 acute R eye vision loss from BRAO, 2014 L visual field deficit due to new occipital lobe stroke while on ASA and plavix. She had an LP and angiogram in 2014 in [facility] no evidence of vasculitis on the angio.     Pertinent Past Medical/Surgical History  Past Medical History:   Diagnosis Date     Arthritis      CAD (coronary artery disease) 3/26/2014     Coronary artery disease      Coronary artery disease involving native coronary artery without angina pectoris 3/26/2015     Hypertension      Malignant neoplasm (H)      Thyroid disease      Type 2 diabetes mellitus, controlled, with renal complications (H) 1/5/2015     Type 2 diabetes, HbA1C goal < 8% (H) 1/5/2015     Unspecified cerebral artery  occlusion with cerebral infarction    Claudication  Overdose (ambien and narcotic abuse, previous commitment to rehab)  Renal cancer s/p nephrectomy  Tobacco abuse (quit 2012 per chart)    Past Surgical History:   Procedure Laterality Date     KIDNEY SURGERY  06/23/2004    cancer, kidney partial removal   R common iliac and external iliac stent placement 8/2014  Cardiac stents placed (per daughter)    Medications:   Prescriptions Prior to Admission   Medication Sig Dispense Refill Last Dose     zolpidem (AMBIEN) 5 MG tablet TAKE ONE TABLET BY MOUTH ONCE DAILY AT BEDTIME AS NEEDED 15 tablet 2      HYDROcodone-acetaminophen (NORCO) 7.5-325 MG per tablet Take 1 tablet by mouth every 6 hours as needed for moderate to severe pain 30 tablet 0      levothyroxine (SYNTHROID, LEVOTHROID) 88 MCG tablet Take 1 tablet (88 mcg) by mouth daily 90 tablet 3      carvedilol (COREG) 12.5 MG tablet Take 1 tablet (12.5 mg) by mouth 2 times daily (with meals) 180 tablet 0      fenofibrate 145 MG tablet Take 1 tablet (145 mg) by mouth daily 90 tablet 3      rosuvastatin (CRESTOR) 10 MG tablet Take 1 tablet (10 mg) by mouth daily 90 tablet 0      clopidogrel (PLAVIX) 75 MG tablet Take 1 tablet (75 mg) by mouth daily 90 tablet 0      cinnamon (HM CINNAMON) 500 MG CAPS Take 2 capfuls by mouth 3 times daily 180 capsule 11      Alpha-Lipoic Acid 200 MG TABS Take 1 tablet by mouth 2 times daily 180 tablet 3      Cholecalciferol (VITAMIN D) 2000 UNITS CAPS Take 1 tablet by mouth daily 90 capsule 3      blood glucose (ZEE MICROLET 2) lancing device Use to test blood sugars TWICE DAILY  times daily or as directed. 300 each 0 Taking     ASPIRIN NOT PRESCRIBED, INTENTIONAL, 1 each daily Antiplatelet medication not prescribed intentionally due to Current thienopryridine therapy 0 each 0 Taking     SODIUM BICARBONATE PO Take 20 mg by mouth 3 times daily   Taking     blood glucose (ZEE CONTOUR) test strip Use to test blood sugars TWICE DAILY   times daily or as directed. 100 strip 11 Taking     blood glucose monitoring (ZEE CONTOUR MONITOR) meter device kit Use to test blood sugars TWICE DAILY  times daily or as directed.  MAY HAVE GENERIC CONTROL SOLUTIONS, STRIPS TWICE DAILY AND LANCETS   NEWLY DISCOVERED DIABETES 2 GOAL 8% 250.00  MAY HAVE GENERIC KIT AS WELL 1 kit 0 Taking     blood glucose calibration (ZEE CONTOUR) NORMAL solution Use to calibrate blood glucose monitor as directed. 1 Bottle 11 Taking     blood glucose monitoring (ONE TOUCH DELICA) lancets Use to test blood sugars TWICE DAILY  times daily or as directed.  GENERIC OK 1 Box 11 Taking     nitroglycerin (NITROSTAT) 0.4 MG SL tablet Place 1 tablet (0.4 mg) under the tongue every 5 minutes as needed for chest pain 25 tablet prn Taking     FISH OIL 3 capsules every morning.   Taking   .    Allergies:   Allergies   Allergen Reactions     Ceclor [Cefaclor Monohydrate]      Codeine      Demerol      Oxycodone Itching     Penicillins      Septra [Sulfamethoxazole W-Trimethoprim]      Somatropin      Could not walk or talk     Tramadol GI Disturbance     Ultram     Tricyclic Antidepressants      Hyper crazy     Valium    .    Family History: Daughter with hemiplegic migraines. No strong fam hx of stroke.    Social History: Lives alone, daughter in [place]. Hx of ambien/narcotic abuse. , 2nd   of cancer? Former smoker, per chart review quit in .    ROS:  Review of systems not obtained due to patient factors - aphasia    PHYSICAL EXAMINATION  Vital Signs:  B/P: 126/75,  T: 97.5,  P: 78,  R: 13    General:  Lying in bed, no acute distress but frustrated when trying to speak    HEENT:  normocephalic/atraumatic  Cardio:  RRR  Pulmonary:  no respiratory distress  Abdomen:  non-distended  Extremities:  no edema  Skin:  intact, warm/dry     Neurologic  Mental Status:  Awake and alert. Able to state her name. Stuttering speech. States age [age], although may just be  "stuttering over \"sevent[age]\". Month correct. Phonemic paraphasias and neologisms. Follows commands. Eventually able to express what she wants to say. Frustrated/aware of deficit.  Cranial Nerves:  visual fields intact, EOMI with normal smooth pursuit, facial sensation intact and symmetric, facial movements symmetric, hearing not formally tested but intact to conversation, no dysarthria, tongue protrusion midline  Motor:  no abnormal movements, no pronator drift, able to move all limbs spontaneously, strength 5/5 throughout upper and lower extremities  Sensory:  Intact and symmetric to light touch  Coordination:  FNF and HS intact without dysmetria      Labs  Labs and Imaging reviewed and used in developing the plan; pertinent results included.     Lab Results   Component Value Date     (H) 09/10/2017     Results for orders placed or performed during the hospital encounter of 09/10/17   CT Head w/o Contrast   Result Value Ref Range    Radiologist flags Acute infarct (Urgent)     Narrative    Head CT without contrast 9/10/2017 8:01 PM    History: Code stroke, expressive aphasia starting this morning    Comparison:  None    Technique:   HEAD CT:  Using multidetector thin collimation helical acquisition  technique, axial, coronal and sagittal CT images from the skull base  to the vertex were obtained without intravenous contrast.     Findings:  Head CT: Loss of gray-white differentiation within the anterior left  temporal lobe (series 3, image 15), and the middle left temporal lobe  (series 3, image 19). Scattered old small infarcts, including the left  insula, left caudate, right cerebellar hemisphere, and medial right  occipital lobe. Age-indeterminate infarct in the left frontal  operculum (series 3, image 18).    No significant mass effect, midline shift, or extra-axial fluid  collection. Patent basal cisterns. Ventricles are not enlarged. No  intracranial hemorrhage. Intact bony calvarium. Clear mastoid " air  cells and paranasal sinuses. Unremarkable orbits. Bilateral vertebral  and internal carotid artery calcifications.      Impression    Impression:   1. Acute infarcts in the anterior and middle left temporal lobe.  Age-indeterminate infarct in the left frontal operculum.  2. Scattered old small infarcts, including within the left insula,  left caudate, right cerebellar hemisphere, and medial right occipital  lobe.     [Urgent Result: Acute infarct]    Finding was identified on 9/10/2017 8:01 PM.     Dr. Orr was contacted by Dr. Alejandro Mares at 9/10/2017 8:09 PM and  verbalized understanding of the urgent finding.     I have personally reviewed the examination and initial interpretation  and I agree with the findings.    TELLO KIRKPATRICK MD   CBC with platelets   Result Value Ref Range    WBC 9.6 4.0 - 11.0 10e9/L    RBC Count 4.56 3.8 - 5.2 10e12/L    Hemoglobin 14.5 11.7 - 15.7 g/dL    Hematocrit 42.4 35.0 - 47.0 %    MCV 93 78 - 100 fl    MCH 31.8 26.5 - 33.0 pg    MCHC 34.2 31.5 - 36.5 g/dL    RDW 14.6 10.0 - 15.0 %    Platelet Count 245 150 - 450 10e9/L   Partial thromboplastin time   Result Value Ref Range    PTT 34 22 - 37 sec   INR   Result Value Ref Range    INR 0.93 0.86 - 1.14   Basic metabolic panel   Result Value Ref Range    Sodium 142 133 - 144 mmol/L    Potassium 4.5 3.4 - 5.3 mmol/L    Chloride 113 (H) 94 - 109 mmol/L    Carbon Dioxide 23 20 - 32 mmol/L    Anion Gap 6 3 - 14 mmol/L    Glucose 112 (H) 70 - 99 mg/dL    Urea Nitrogen 21 7 - 30 mg/dL    Creatinine 1.48 (H) 0.52 - 1.04 mg/dL    GFR Estimate 35 (L) >60 mL/min/1.7m2    GFR Estimate If Black 42 (L) >60 mL/min/1.7m2    Calcium 8.2 (L) 8.5 - 10.1 mg/dL   Troponin I   Result Value Ref Range    Troponin I ES 0.036 0.000 - 0.045 ug/L   Glucose by meter   Result Value Ref Range    Glucose 150 (H) 70 - 99 mg/dL   INR point of care   Result Value Ref Range    INR Point of Care 1.2 (H) 0.86 - 1.14   Creatinine POCT   Result Value Ref Range     Creatinine 1.6 (H) 0.52 - 1.04 mg/dL    GFR Estimate 32 (L) >60 mL/min/1.7m2    GFR Estimate If Black 39 (L) >60 mL/min/1.7m2   Glucose by meter   Result Value Ref Range    Glucose 100 (H) 70 - 99 mg/dL   EKG 12-lead, tracing only   Result Value Ref Range    Interpretation ECG Click View Image link to view waveform and result    Troponin POCT   Result Value Ref Range    Troponin I 0.00 0.00 - 0.10 ug/L        The patient was discussed with the fellow, Dr. Rodriguez.  The admitting staff is Dr. Negrete, tomorrow (9/11) will be Dr. Paredes.    Cecile Chris MD  Neurology PGY4

## 2017-09-11 NOTE — PLAN OF CARE
Problem: Goal Outcome Summary  Goal: Goal Outcome Summary  Outcome: No Change  D/I:Reminded again that she will have MRI tonight with IV sedation. She gest easily upset when she can't say the right words.MD's came to see her and told her that they would order a nicotine substitute for her instead of letting her go out to smoke.  Once MD's left room she went into bathroom to smoke. Informed her that she cannot smoke here and MD's notified. She is refusing nicotine substitutes.She was calm after getting back to bed and replacing EEG leads.  P:Continue to assess.

## 2017-09-11 NOTE — PLAN OF CARE
Problem: Goal Outcome Summary  Goal: Goal Outcome Summary  PT 6A:  Defer. Acknowledge order placed for PT eval and treat.  Upon chart review and discussion with RN and OT, no acute needs identified requiring skilled PT intervention.      Current level of assist for mobility: (I), RN reports good stability, balance when walking to bathroom  Current level of assist for ADLs:  (I)  Barriers to discharge: Communication deficits. No Mobility deficits identified.  Disciplines to follow: ST for language training.

## 2017-09-11 NOTE — PROGRESS NOTES
"   09/11/17 1239   General Information, SLP   Type of Evaluation Speech and Language   Type of Visit Initial   Start of Care Date 09/11/17   Onset of Illness/Injury or Date of Surgery - Date 09/10/17   Referring Physician Cecile Chris MD    Patient/Family Goals Statement Unable to state due to aphasia, obvious frustration with communication attempts   Pertinent History of Current Problem Ms. Sowmya Gibbs is a right handed 70 yo female with PMHx including HTN, HLD, T2DM, CKD, complex migraines, multiple prior TIA's/CVA's, who presented with acute onset aphasia starting 9/10/17. Last known normal was 9/9/17 at 11pm. She woke up with a headache and noticed she was unable to speak clearly. She has a history of stroke causing aphasia which resolved while inpatient. She stated this current episode is similar to prior presentations of strokes. Head CT revealed acute infarcts in the anterior and middle left temporal lobe. CT angiogram was not performed due to patient's CKD. MRI planned for today. Awaiting results from TTE bubble study.    Precautions/Limitations fall precautions   General Observations alert, cooperative but frustrated   Speech   Speech Comments No dysarthria. Speech halting with misarticulations vs. paraphasias. Will require continued assessment for full clinical picture   Language: Auditory Comprehension (understanding of spoken language)   One Step Commands (out of 10 total) 5  (with min cues)   Y/N Simple Questions (out of 10 total) 4   Functional Assessment Scale (Auditory Comprehension) Moderate Impairment   Comments (Auditory Comprehension) Less than chance accuracy with yes/no and 1-step commands questions. Pt appears to follow conversation, often saying \"ya\" but difficult to fully determine level of comprehension due to limited assessment   Language: Verbal Expression (use of spoken language to express information)   Automatized Sequences; [place] Diagnostic Aphasia Exam (out of 8 total) 2 " "  Biographic Information (out of 3 total) 2   Conversation; [place] Diagnostic Aphasia Exam rating (out of 5 total) 1   [place] Naming Test, short form (out of 15 total) (unable to complete due to frustration, 2/7)   Functional Assessment Scale (Verbal Expression) Moderate to Severe Impairment   Reading Comprehension (understanding of written language)   Comments (Reading Comprehension) Did not assess due to time constraints   Written Expression (use of writing to express information)   Comments (Written Expression) Did not assess due to time constraints   Cognitive Status Examination   Attention intact   Orientation x2, difficult to assess 2/2 aphasia   Standardized cognitive-linguistic assessment completed (Cognitive screen deferred at this time given aphasia)   General Therapy Interventions   Planned Therapy Interventions Language   Language Auditory comprehension;Reading comprehension;Verbal expression;Written expression   Clinical Impression, SLP Eval   Criteria for Skilled Therapeutic Interventions Met Yes   SLP Diagnosis moderate-severe global aphasia   Functional limitations due to impairments inability to express wants/needs in social, health and emergent situations   Rehab Potential Good, to achieve stated therapy goals   Rehab potential affected by motivation, awareness, family support   Therapy Frequency Daily   Predicted Duration of Therapy Intervention (days/wks) less than 1 week IP, ongoing SLP as OP   Anticipated Discharge Disposition Home with Outpatient Therapy   Risks and Benefits of Treatment have been explained. Yes   Patient, Family & other staff in agreement with plan of care Yes   Clinical Impression Comments Speech-Language evaluation completed per MD order. Exam limited by pt frustration with current communication skills. Pt presents with moderate-severe global aphasia in the setting of new stroke. Pt largely frustrated during exam, asking \"why\" and saying expletives. Pt with success writing " name, where she was from and significant others name with whiteboard. Current level of skills marked with word finding deficits, perseveration, paraphasias, neologisms, and impaired comprehension. Recommend daily SLP tx for aphasia while IP with ongoing OP SLP at discharge to maximize communication effectiveness.   Total Evaluation Time      Total Evaluation Time (Minutes) 20

## 2017-09-11 NOTE — PLAN OF CARE
Problem: Goal Outcome Summary  Goal: Goal Outcome Summary  Outcome: No Change  Patient admitted yesterday through the ER with aphasia. No TPA due to the time limit. Main issues are with expressive and possible receptive aphasia. ORDONEZ strong but struggles with words and following some of the neuro exam(finger to nose/ heal to knee), Vitals stable, AF. Patient has Hx of previous stroke and continues to be a smoker. Mod carb diet and IV at 75 cc/ hour. Steady when up and denies dizziness. Has blurred vision but may be related to no glasses. Tylenol given for HA tonight. Up to the bathroom to void with SBA. If MRI needed ,she stated to previous shift she needs sedation. Bubble study today. Sleeping well between checks. Cardiac monitor showing NSR. Continue to monitor and treat per plan of care.

## 2017-09-11 NOTE — PLAN OF CARE
Problem: Goal Outcome Summary  Goal: Goal Outcome Summary  Outcome: No Change  Pt arrived from ER around 2200 after being admitted for a stroke. A&O x4. Mild to moderate aphasia present. Other neuros are intact, except baseline N/T to L foot from previous back surgery. Continuous cardiac monitor initiated. PIV SL, but will need to start on NS at 75 ml/hr, when pump arrives. Good PO intake on mod CHO diet. Understanding stroke booklet given to pt. No MRI ordered at this time, but per pt, she will need conscious sedation should one be ordered. Up ad santiago in room. Will continue to monitor.

## 2017-09-12 ENCOUNTER — APPOINTMENT (OUTPATIENT)
Dept: CARDIOLOGY | Facility: CLINIC | Age: 72
DRG: 040 | End: 2017-09-12
Payer: MEDICARE

## 2017-09-12 ENCOUNTER — APPOINTMENT (OUTPATIENT)
Dept: CT IMAGING | Facility: CLINIC | Age: 72
DRG: 040 | End: 2017-09-12
Attending: STUDENT IN AN ORGANIZED HEALTH CARE EDUCATION/TRAINING PROGRAM
Payer: MEDICARE

## 2017-09-12 LAB
ANION GAP SERPL CALCULATED.3IONS-SCNC: 6 MMOL/L (ref 3–14)
B2 GLYCOPROT1 IGG SERPL IA-ACNC: 0.7 U/ML
B2 GLYCOPROT1 IGM SERPL IA-ACNC: 0.9 U/ML
BUN SERPL-MCNC: 20 MG/DL (ref 7–30)
CALCIUM SERPL-MCNC: 8.6 MG/DL (ref 8.5–10.1)
CHLORIDE SERPL-SCNC: 113 MMOL/L (ref 94–109)
CO2 SERPL-SCNC: 22 MMOL/L (ref 20–32)
CREAT SERPL-MCNC: 1.29 MG/DL (ref 0.52–1.04)
GFR SERPL CREATININE-BSD FRML MDRD: 41 ML/MIN/1.7M2
GLUCOSE BLDC GLUCOMTR-MCNC: 103 MG/DL (ref 70–99)
GLUCOSE BLDC GLUCOMTR-MCNC: 154 MG/DL (ref 70–99)
GLUCOSE BLDC GLUCOMTR-MCNC: 81 MG/DL (ref 70–99)
GLUCOSE SERPL-MCNC: 88 MG/DL (ref 70–99)
HBA1C MFR BLD: 5.6 % (ref 4.3–6)
INTERPRETATION ECG - MUSE: NORMAL
LA PPP-IMP: NEGATIVE
MRSA DNA SPEC QL NAA+PROBE: NEGATIVE
POTASSIUM SERPL-SCNC: 4.6 MMOL/L (ref 3.4–5.3)
SODIUM SERPL-SCNC: 142 MMOL/L (ref 133–144)
SPECIMEN SOURCE: NORMAL

## 2017-09-12 PROCEDURE — 00000146 ZZHCL STATISTIC GLUCOSE BY METER IP

## 2017-09-12 PROCEDURE — 87640 STAPH A DNA AMP PROBE: CPT | Performed by: PSYCHIATRY & NEUROLOGY

## 2017-09-12 PROCEDURE — 25000132 ZZH RX MED GY IP 250 OP 250 PS 637: Mod: GY | Performed by: STUDENT IN AN ORGANIZED HEALTH CARE EDUCATION/TRAINING PROGRAM

## 2017-09-12 PROCEDURE — 93312 ECHO TRANSESOPHAGEAL: CPT | Mod: 26 | Performed by: INTERNAL MEDICINE

## 2017-09-12 PROCEDURE — 80048 BASIC METABOLIC PNL TOTAL CA: CPT | Performed by: STUDENT IN AN ORGANIZED HEALTH CARE EDUCATION/TRAINING PROGRAM

## 2017-09-12 PROCEDURE — 25000128 H RX IP 250 OP 636: Performed by: PSYCHIATRY & NEUROLOGY

## 2017-09-12 PROCEDURE — 93325 DOPPLER ECHO COLOR FLOW MAPG: CPT | Mod: 26 | Performed by: INTERNAL MEDICINE

## 2017-09-12 PROCEDURE — 25000125 ZZHC RX 250: Performed by: INTERNAL MEDICINE

## 2017-09-12 PROCEDURE — A9270 NON-COVERED ITEM OR SERVICE: HCPCS | Mod: GY | Performed by: PSYCHIATRY & NEUROLOGY

## 2017-09-12 PROCEDURE — 99152 MOD SED SAME PHYS/QHP 5/>YRS: CPT

## 2017-09-12 PROCEDURE — 87641 MR-STAPH DNA AMP PROBE: CPT | Performed by: PSYCHIATRY & NEUROLOGY

## 2017-09-12 PROCEDURE — 12000003 ZZH R&B CRITICAL UMMC

## 2017-09-12 PROCEDURE — 25000128 H RX IP 250 OP 636: Performed by: INTERNAL MEDICINE

## 2017-09-12 PROCEDURE — 93320 DOPPLER ECHO COMPLETE: CPT | Mod: 26 | Performed by: INTERNAL MEDICINE

## 2017-09-12 PROCEDURE — 36415 COLL VENOUS BLD VENIPUNCTURE: CPT | Performed by: STUDENT IN AN ORGANIZED HEALTH CARE EDUCATION/TRAINING PROGRAM

## 2017-09-12 PROCEDURE — 74177 CT ABD & PELVIS W/CONTRAST: CPT

## 2017-09-12 PROCEDURE — 25000128 H RX IP 250 OP 636: Performed by: STUDENT IN AN ORGANIZED HEALTH CARE EDUCATION/TRAINING PROGRAM

## 2017-09-12 PROCEDURE — 93320 DOPPLER ECHO COMPLETE: CPT

## 2017-09-12 PROCEDURE — 25000132 ZZH RX MED GY IP 250 OP 250 PS 637: Mod: GY | Performed by: PSYCHIATRY & NEUROLOGY

## 2017-09-12 PROCEDURE — A9270 NON-COVERED ITEM OR SERVICE: HCPCS | Mod: GY | Performed by: STUDENT IN AN ORGANIZED HEALTH CARE EDUCATION/TRAINING PROGRAM

## 2017-09-12 PROCEDURE — 71260 CT THORAX DX C+: CPT

## 2017-09-12 PROCEDURE — 25000125 ZZHC RX 250: Performed by: STUDENT IN AN ORGANIZED HEALTH CARE EDUCATION/TRAINING PROGRAM

## 2017-09-12 RX ORDER — FLUMAZENIL 0.1 MG/ML
0.2 INJECTION, SOLUTION INTRAVENOUS
Status: DISCONTINUED | OUTPATIENT
Start: 2017-09-12 | End: 2017-09-12 | Stop reason: HOSPADM

## 2017-09-12 RX ORDER — SODIUM CHLORIDE 9 MG/ML
INJECTION, SOLUTION INTRAVENOUS CONTINUOUS PRN
Status: DISCONTINUED | OUTPATIENT
Start: 2017-09-12 | End: 2017-09-12 | Stop reason: HOSPADM

## 2017-09-12 RX ORDER — IOPAMIDOL 755 MG/ML
74 INJECTION, SOLUTION INTRAVASCULAR ONCE
Status: COMPLETED | OUTPATIENT
Start: 2017-09-12 | End: 2017-09-12

## 2017-09-12 RX ORDER — NALOXONE HYDROCHLORIDE 0.4 MG/ML
.1-.4 INJECTION, SOLUTION INTRAMUSCULAR; INTRAVENOUS; SUBCUTANEOUS
Status: DISCONTINUED | OUTPATIENT
Start: 2017-09-12 | End: 2017-09-12 | Stop reason: HOSPADM

## 2017-09-12 RX ORDER — FENTANYL CITRATE 50 UG/ML
25 INJECTION, SOLUTION INTRAMUSCULAR; INTRAVENOUS
Status: DISCONTINUED | OUTPATIENT
Start: 2017-09-12 | End: 2017-09-12 | Stop reason: HOSPADM

## 2017-09-12 RX ORDER — FENTANYL CITRATE 50 UG/ML
25-50 INJECTION, SOLUTION INTRAMUSCULAR; INTRAVENOUS
Status: COMPLETED | OUTPATIENT
Start: 2017-09-12 | End: 2017-09-12

## 2017-09-12 RX ORDER — LORAZEPAM 1 MG/1
1-2 TABLET ORAL EVERY 4 HOURS PRN
Status: DISCONTINUED | OUTPATIENT
Start: 2017-09-12 | End: 2017-09-21 | Stop reason: HOSPADM

## 2017-09-12 RX ORDER — LORAZEPAM 1 MG/1
2 TABLET ORAL ONCE
Status: COMPLETED | OUTPATIENT
Start: 2017-09-12 | End: 2017-09-12

## 2017-09-12 RX ADMIN — HYDROCODONE BITARTRATE AND ACETAMINOPHEN 2 TABLET: 7.5; 325 TABLET ORAL at 07:34

## 2017-09-12 RX ADMIN — ACETAMINOPHEN 650 MG: 325 TABLET ORAL at 02:52

## 2017-09-12 RX ADMIN — CLOPIDOGREL 75 MG: 75 TABLET, FILM COATED ORAL at 07:35

## 2017-09-12 RX ADMIN — IOPAMIDOL 74 ML: 755 INJECTION, SOLUTION INTRAVENOUS at 18:24

## 2017-09-12 RX ADMIN — ASPIRIN 81 MG: 81 TABLET, COATED ORAL at 07:37

## 2017-09-12 RX ADMIN — MIDAZOLAM 2 MG: 1 INJECTION INTRAMUSCULAR; INTRAVENOUS at 12:41

## 2017-09-12 RX ADMIN — FENTANYL CITRATE 50 MCG: 50 INJECTION INTRAMUSCULAR; INTRAVENOUS at 12:41

## 2017-09-12 RX ADMIN — HYDROCODONE BITARTRATE AND ACETAMINOPHEN 2 TABLET: 7.5; 325 TABLET ORAL at 20:26

## 2017-09-12 RX ADMIN — HEPARIN SODIUM 5000 UNITS: 5000 INJECTION, SOLUTION INTRAVENOUS; SUBCUTANEOUS at 22:07

## 2017-09-12 RX ADMIN — ROSUVASTATIN CALCIUM 10 MG: 10 TABLET, FILM COATED ORAL at 07:35

## 2017-09-12 RX ADMIN — HEPARIN SODIUM 5000 UNITS: 5000 INJECTION, SOLUTION INTRAVENOUS; SUBCUTANEOUS at 07:41

## 2017-09-12 RX ADMIN — LIDOCAINE HYDROCHLORIDE 30 ML: 20 SOLUTION ORAL; TOPICAL at 12:12

## 2017-09-12 RX ADMIN — HYDROCODONE BITARTRATE AND ACETAMINOPHEN 2 TABLET: 7.5; 325 TABLET ORAL at 14:31

## 2017-09-12 RX ADMIN — LEVOTHYROXINE SODIUM 88 MCG: 88 TABLET ORAL at 07:34

## 2017-09-12 RX ADMIN — SODIUM CHLORIDE: 9 INJECTION, SOLUTION INTRAVENOUS at 17:19

## 2017-09-12 RX ADMIN — TOPICAL ANESTHETIC 1 APPLICATOR: 200 SPRAY DENTAL; PERIODONTAL at 12:12

## 2017-09-12 RX ADMIN — LORAZEPAM 2 MG: 1 TABLET ORAL at 19:42

## 2017-09-12 RX ADMIN — SODIUM CHLORIDE, PRESERVATIVE FREE 69 ML: 5 INJECTION INTRAVENOUS at 18:24

## 2017-09-12 ASSESSMENT — VISUAL ACUITY
OU: OTHER (SEE COMMENT)
OU: BASELINE;BLURRED VISION
OU: OTHER (SEE COMMENT)
OU: BASELINE;BLURRED VISION
OU: OTHER (SEE COMMENT)

## 2017-09-12 NOTE — PROGRESS NOTES
"CLINICAL NUTRITION SERVICES - ASSESSMENT NOTE     Nutrition Prescription    RECOMMENDATIONS FOR MDs/PROVIDERS TO ORDER:  None today     Malnutrition Status:    Non-severe malnutrition in the context of chronic illness    Recommendations already ordered by Registered Dietitian (RD):  None today    Future/Additional Recommendations:  If PO intake < 75% and/or wt declines, consider ordering Room Service w/ Assist (aphasia may be affecting ability to order meals) vs oral nutrition supplements pending pt acceptance to potentially promote adequate PO intakes      REASON FOR ASSESSMENT  Sowmya Gibbs is a 71 year old female assessed by the dietitian for Admission Nutrition Risk Screen for unintentional loss of 10# or more in the past two months    NUTRITION HISTORY  Per chart review, pertinent PMH includes: CKD, partial nephrectomy 2/2 renal cancer, MI, claudication, DM2, HTN, HLD, hx of multiple strokes.    Pt reports eating well PTA. Difficult to obtain greater nutrition hx in the setting of aphasia.     CURRENT NUTRITION ORDERS   Diet: NPO since midnight    Intake/Tolerance: Pt reports she has been not been eating at baseline since admit due to extreme dislike of hospital food. However, RN notes good PO intake.    LABS  TG - 224 (H)  BG - 80s-150s over the past 24 hrs.   HgbA1c - 5.3 in 9/2015    MEDICATIONS  Medications reviewed    ANTHROPOMETRICS  Height: 0 cm (Data Unavailable) 5' 5\"  Ht Readings from Last 2 Encounters:   02/04/16 1.651 m (5' 5\")   09/04/15 1.651 m (5' 5\")     Most Recent Weight: 55.7 kg (122 lb 12.7 oz)    IBW: 57 kg  BMI: Normal BMI   Weight History: Pt was unsure of her UBW and any recent fluctuations in her wt. Per nursing, pt has hx of recent wt loss per daughter, though unspecified. No recent wts on record.   Wt Readings from Last 10 Encounters:   09/10/17 55.7 kg (122 lb 12.7 oz)   02/04/16 55.2 kg (121 lb 9.6 oz)   11/03/15 58 kg (127 lb 12.8 oz)   10/09/15 57.6 kg (127 lb)   09/04/15 " 56.7 kg (125 lb)   07/10/15 60.8 kg (134 lb)   05/15/15 61.1 kg (134 lb 9.6 oz)   03/20/15 61.8 kg (136 lb 3.2 oz)   01/02/15 62.3 kg (137 lb 6.4 oz)   10/10/14 62 kg (136 lb 9.6 oz)     Dosing Weight: 56 kg (actual, based on admit wt)     ASSESSED NUTRITION NEEDS  Estimated Energy Needs: 6621-1539 kcals/day (25 - 30 kcals/kg)  Justification: Maintenance  Estimated Protein Needs: 67-84 grams protein/day (1.2 - 1.5 grams of pro/kg)  Justification: Hypercatabolism with acute illness  Estimated Fluid Needs: (1 mL/kcal)   Justification: Maintenance or per provider pending fluid status    PHYSICAL FINDINGS  See malnutrition section below.    MALNUTRITION  % Intake: Decreased intake does not meet criteria  % Weight Loss: Unable to assess - pt's daughter reported wt loss, though unspecified amt/timeline  Subcutaneous Fat Loss: Facial region, Upper arm, Lower arm, and Thoracic/intercostal:  Mild/moderate   Muscle Loss: Facial & jaw region, Upper arm (bicep, tricep) and Lower arm  (forearm):  Moderate vs sarcopenia  Fluid Accumulation/Edema: None noted  Malnutrition Diagnosis: Non-severe malnutrition in the context of chronic illness    NUTRITION DIAGNOSIS  Inadequate oral intake related to extreme dislike of inpatient food as evidenced by pt report      INTERVENTIONS  Implementation  Nutrition Education: Discussed alternative menu options vs oral nutrition supplements to promote adequate PO intakes for weight maintenance. Pt declined nutrition interventions at this time.         Goals  Patient to consume % of nutritionally adequate meal trays TID, or the equivalent with supplements/snacks.     Monitoring/Evaluation  Progress toward goals will be monitored and evaluated per protocol.    Tata Palma RD, LD  Neuro / 6A Pager: [phone]

## 2017-09-12 NOTE — DISCHARGE SUMMARY
[facility], [place]    Neurology Stroke Discharge Summary    Date of Admission: 9/10/2017  Date of Discharge: 9/19/17  Disposition: Discharged to home  Primary Care Physician: Chaka Knott      Admission Diagnosis:   Acute ischemic stroke (anterior and middle left temporal lobe)    Discharge Diagnosis:   Ischemic Stroke due to undetermined etiology    Problem Leading to Hospitalization ([facility] [place]):   Sowmya Gibbs is a 71 year old female with PMHx CKD, partial nephrectomy 2/2 renal cancer, MI, claudication, DM2, HTN, HLD, complex migraines, multiple strokes, who presented w/ acute onset aphasia. Patient presented 12 hours after symptom onset. History was obtained with difficulty from the patient (who has expressive aphasia). Patient stated that she woke up with a headache, and then noticed she was unable to speak clearly. Noticed speech difficulties at 7 am. She had no other symptoms at all. She states that she has a history of stroke causing aphasia, and this was just like her prior episode.       Apparently MRI in 2007 already had areas of prior infarct in R cerebellum, L insula, L cauda and L paraventricular.  Per chart review, 2001 had acute onset aphasia (presume stroke), 2013 acute R eye vision loss from BRAO, 2014 L visual field deficit due to new occipital lobe stroke while on ASA and plavix. She had an LP and angiogram in 2014 in [facility]; no evidence of vasculitis on the angio.     Please see H&P dated 9/10/2017 for further details about presentation.    Brief Hospital Course:   Patient presented with acute onset of aphasia. She was found to have acute ischemic infarcts in the anterior and middle left lobe. Patient was outside the therapeutic window for IV tPA and therefore it was not give. Work-up as stated below under Pertinent Investigations.    Patient with a significant atheroma in the aortic arch and descending thoracic aorta. Patient also found to have small  mobile mass attached to the A2 scallop of the mitral valve. Patient was reviewed by [facility] with impression that mobile mass is not likely a source of emboli with her stroke presentation.   - Patient will need dual antiplatelet course of 3 months at discharge.  - Patient has had implantable cardiac event monitor placed on 9/15/17.  - Continue ASA 81mg  - Continue Plavix 75mg  - Continue atorvastatin 80mg    Rehab evaluation: OT, PT, SLP and PM&R.     Smoking Cessation: patient quit in 2016    BP Long-term Goal: 140/90 or less    Antithrombotic/Anticoagulant Agent: aspirin 81 mg and clopidogrel (Plavix) 75 mg    Statins: Started on atorvastatin 80mg       Hgb A1C Goal: < 7.0    Complications: None.     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 Mobile mass seen on PARVEZ is not likely source of emboli for her current stroke.  - Recommend follow-up with cardiology for periodic monitoring of MR.       #prominent mediastinal lymph notes  - Discussed with radiology on 9/13, likely reactive as patient also has centrilobular emphysema  - For follow up CT as outpatient within 3-6 months      #10mm lesion along the superior pole of the right kidney  - Represent a hemorrhagic or proteinaceous cyst, solid mass or residual  from prior intervention, likely residual after discussion with radiology  - For follow up CT as outpatient within 3-6 months       #headaches  - On scheduled acetaminophen  - On magnesium supplements  - Ibuprofen PRN     #Non-severe malnutrition  - 10Ib weight loss within the last 2 months as noted by her daughter, weight loss of 5% x 3 months with subcutaneous fat loss  - RN noted good PO intake     #HTN: Continued carvidelol 12.5mg       #HLD  - Lipid profile: HDL 28 (L),  (H), LDL  88  - Atorvastatin 80mg      #DMII:  consistent carb diet and glucose checks      #Hypothyroidism: Continued L-thyroxine 88mcg    PERTINENT INVESTIGATIONS    Labs  Lipid Panel:   Recent Labs   Lab Test  09/11/17   0606  09/04/15   1355   CHOL  161  217*   HDL  28*  33*   LDL  88  122   TRIG  224*  308*   CHOLHDLRATIO   --   6.6*     A1C:   Lab Results   Component Value Date    A1C 5.3 09/04/2015     INR: No lab results found in last 7 days.   Coag Panel / Hypercoag Workup: Cardiolipin antibody - negative  Pending test results: Lupus anticoagulant, beta-2 glycoprotein    Echo:   TTE w/ bubble: No cardiac source for embolus identified. The atrial septum is intact as assessed by color Doppler and agitated dextrose bubble study. Both atria appear normal with intact atrial septum. No RWMA. With normal LV and RV function.    PARVEZ: Global and regional left ventricular function is normal with an EF of 55-60%.  Global right ventricular function is normal. The left atrial appendage is free of spontaneous echo contrast and thrombus. The atrial septum is intact as assessed by color Doppler and agitated saline bubble study. The mitral valve leaflet thickness is normal. There is a small mobile mass attached to the A2 scallop of the mitral valve, consistent with a ruptured minor chordae. There is mild-moderate mitral regurgitation (ERO 0.18 cm2, RVol 42 mL/beat). Complex atheroma of the aorta present in the arch and descending thoracic aorta.  No pericardial effusion present.    Imaging:     Head CT without contrast 9/10/2017 8:01 PM  Impression:   1. Acute infarcts in the anterior and middle left temporal lobe.  Age-indeterminate infarct in the left frontal operculum.  2. Scattered old small infarcts, including within the left insula,  left caudate, right cerebellar hemisphere, and medial right occipital lobe.    MRI brain without contrast, MRA of the head without contrast, Neck MRA without contrast 9/11/2017 at 6:01  PM    Impression:  1. Redemonstration of acute infarct involving the anterior and middle  left temporal lobe. Head MRA is notable for diminished flow related  signal in the left middle cerebral artery territory, particularly in  an inferior M3 branch.  2. Chronic-appearing infarcts of the left insula, right medial  occipital lobe, and right cerebellar hemisphere. Superimposed moderate  chronic small vessel ischemic disease.  3. Neck MRA demonstrates patent major cervical arteries.    Endovascular procedure: None     Cardiac Monitoring: Patient had > 24 hrs of cardiac monitor while in hospital.    Findings: No atrial fibrillation was found.    Sleep Apnea Screen:   Questions/Answers      1. Prior to your stroke, have you been told that you snore? No.    2. Prior to your stroke, have you been told that you struggle to breath while you are sleeping? No.    3. Prior to your stroke, do you feel tired and sleepy even after getting a normal night of sleep? Yes.    Score of 0-1 therefore unlikely to have sleep apnea/require sleep referral.      PHQ-9 Depression Screen Score: 2    PHYSICAL EXAMINATION  Vital Signs:  B/P: 147/77, T: 97.9, P: 54, R: 16    See progress note dated 9/19 for physical exam    National Institutes of Health Stroke Scale (on day of discharge)  NIHSS Total Score: 2    Modified Tate Scale (on day of discharge): 2-Slight disability; unable to carry out all previous activities, but able to look after own affairs    Medications    Current Discharge Medication List      START taking these medications    Details   acetaminophen (TYLENOL) 325 MG tablet Take 2 tablets (650 mg) by mouth every 6 hours as needed for mild pain or fever  Qty: 100 tablet, Refills: 11    Associated Diagnoses: Cerebrovascular accident (CVA), unspecified mechanism (H)      aspirin EC 81 MG EC tablet Take 1 tablet (81 mg) by mouth daily  Qty: 60 tablet, Refills: 11    Associated Diagnoses: Cerebrovascular accident (CVA), unspecified  mechanism (H)      ibuprofen (ADVIL/MOTRIN) 600 MG tablet Take 1 tablet (600 mg) by mouth every 6 hours as needed for moderate pain  Qty: 120 tablet, Refills: 3    Associated Diagnoses: Cerebrovascular accident (CVA), unspecified mechanism (H)      atorvastatin (LIPITOR) 80 MG tablet Take 1 tablet (80 mg) by mouth daily  Qty: 30 tablet, Refills: 11    Associated Diagnoses: Cerebrovascular accident (CVA), unspecified mechanism (H)         CONTINUE these medications which have CHANGED    Details   !! carvedilol (COREG) 12.5 MG tablet Take 1 tablet (12.5 mg) by mouth 2 times daily (with meals)  Qty: 60 tablet, Refills: 11    Associated Diagnoses: Cerebrovascular accident (CVA), unspecified mechanism (H)      !! clopidogrel (PLAVIX) 75 MG tablet Take 1 tablet (75 mg) by mouth daily  Qty: 30 tablet, Refills: 3    Associated Diagnoses: Cerebrovascular accident (CVA), unspecified mechanism (H)      !! levothyroxine (SYNTHROID/LEVOTHROID) 88 MCG tablet Take 1 tablet (88 mcg) by mouth every morning (before breakfast)  Qty: 30 tablet, Refills: 11    Associated Diagnoses: Cerebrovascular accident (CVA), unspecified mechanism (H)       !! - Potential duplicate medications found. Please discuss with provider.      CONTINUE these medications which have NOT CHANGED    Details   zolpidem (AMBIEN) 5 MG tablet TAKE ONE TABLET BY MOUTH ONCE DAILY AT BEDTIME AS NEEDED  Qty: 15 tablet, Refills: 2    Associated Diagnoses: Insomnia, unspecified insomnia      HYDROcodone-acetaminophen (NORCO) 7.5-325 MG per tablet Take 1 tablet by mouth every 6 hours as needed for moderate to severe pain  Qty: 30 tablet, Refills: 0    Associated Diagnoses: Nonintractable migraine, unspecified migraine type      !! levothyroxine (SYNTHROID, LEVOTHROID) 88 MCG tablet Take 1 tablet (88 mcg) by mouth daily  Qty: 90 tablet, Refills: 3    Associated Diagnoses: Other specified hypothyroidism      !! carvedilol (COREG) 12.5 MG tablet Take 1 tablet (12.5 mg) by  mouth 2 times daily (with meals)  Qty: 180 tablet, Refills: 0    Associated Diagnoses: Stable angina (H); Coronary artery disease involving native coronary artery of native heart without angina pectoris      fenofibrate 145 MG tablet Take 1 tablet (145 mg) by mouth daily  Qty: 90 tablet, Refills: 3    Associated Diagnoses: CARDIOVASCULAR SCREENING; LDL GOAL LESS THAN 130      rosuvastatin (CRESTOR) 10 MG tablet Take 1 tablet (10 mg) by mouth daily  Qty: 90 tablet, Refills: 0    Associated Diagnoses: Coronary artery disease involving native coronary artery of native heart without angina pectoris      !! clopidogrel (PLAVIX) 75 MG tablet Take 1 tablet (75 mg) by mouth daily  Qty: 90 tablet, Refills: 0    Associated Diagnoses: H/O: stroke with residual effects      cinnamon ( CINNAMON) 500 MG CAPS Take 2 capfuls by mouth 3 times daily  Qty: 180 capsule, Refills: 11    Associated Diagnoses: Coronary artery disease involving native coronary artery of native heart without angina pectoris      Alpha-Lipoic Acid 200 MG TABS Take 1 tablet by mouth 2 times daily  Qty: 180 tablet, Refills: 3    Associated Diagnoses: Type 2 diabetes mellitus with diabetic nephropathy (H)      Cholecalciferol (VITAMIN D) 2000 UNITS CAPS Take 1 tablet by mouth daily  Qty: 90 capsule, Refills: 3    Associated Diagnoses: Vitamin D deficiency      blood glucose (ZEE MICROLET 2) lancing device Use to test blood sugars TWICE DAILY  times daily or as directed.  Qty: 300 each, Refills: 0    Associated Diagnoses: Type 2 diabetes mellitus with diabetic nephropathy (H)      ASPIRIN NOT PRESCRIBED, INTENTIONAL, 1 each daily Antiplatelet medication not prescribed intentionally due to Current thienopryridine therapy  Qty: 0 each, Refills: 0    Associated Diagnoses: Type 2 diabetes mellitus, controlled, with renal complications (H); Coronary artery disease involving native coronary artery without angina pectoris      SODIUM BICARBONATE PO Take 20 mg by  mouth 3 times daily      blood glucose (ZEE CONTOUR) test strip Use to test blood sugars TWICE DAILY  times daily or as directed.  Qty: 100 strip, Refills: 11    Associated Diagnoses: Type 2 diabetes, HbA1C goal < 8% (H)      blood glucose monitoring (ZEE CONTOUR MONITOR) meter device kit Use to test blood sugars TWICE DAILY  times daily or as directed.  MAY HAVE GENERIC CONTROL SOLUTIONS, STRIPS TWICE DAILY AND LANCETS   NEWLY DISCOVERED DIABETES 2 GOAL 8% 250.00  MAY HAVE GENERIC KIT AS WELL  Qty: 1 kit, Refills: 0    Associated Diagnoses: Type 2 diabetes, HbA1C goal < 8% (H)      blood glucose calibration (ZEE CONTOUR) NORMAL solution Use to calibrate blood glucose monitor as directed.  Qty: 1 Bottle, Refills: 11    Associated Diagnoses: Type 2 diabetes, HbA1C goal < 8% (H)      blood glucose monitoring (ONE TOUCH DELICA) lancets Use to test blood sugars TWICE DAILY  times daily or as directed.  GENERIC OK  Qty: 1 Box, Refills: 11    Associated Diagnoses: Type 2 diabetes, HbA1C goal < 8% (H)      nitroglycerin (NITROSTAT) 0.4 MG SL tablet Place 1 tablet (0.4 mg) under the tongue every 5 minutes as needed for chest pain  Qty: 25 tablet, Refills: prn    Associated Diagnoses: CAD (coronary artery disease)      FISH OIL 3 capsules every morning.       !! - Potential duplicate medications found. Please discuss with provider.          Additional recommendations and follow up:      Home Care SLP Referral for Hospital Discharge     Home Care Social Service Referral for Hospital Discharge     Speech Therapy Referral     Cardiology Eval Adult Referral     Reason for your hospital stay   Acute Stroke     Activity   Your activity upon discharge: activity as tolerated     Monitor and record   blood pressure daily     When to contact your care team   Call the Stroke Team Your risk factors for stroke or TIA (transient ischemic attack): Your risk factors for stroke or TIA (transient ischemic attack):    Your Risk Factors  Your Results Normal Ranges  High blood pressure BP Readings from Last 1 Encounters:  17 : 107/63  Less than 120/80  Cholesterol              Total Lab Results      Component                Value               Date                      CHOL                     161                 2017             Less than 150  Triglycerides   Lab Results      Component                Value               Date                      TRIG                     224                 2017           Less than 150  LDL Lab Results      Component                Value               Date                      LDL                      88                  2017             Less than 70  HDL Lab Results      Component                Value               Date                      HDL                      28                  2017                 Greater than 40 (men)  Greater than 50 (women)  Diabetes @labrcntip(g)@ Fasting blood glucose   Smoking/tobacco use Quit smoking and tobacco  Overweight Lose 1-2 pounds a week  Lack of exercise 30 minutes moderate activity each day  Other risk factors include carotid (neck) artery disease, atrial fibrillation and stress. You may be on new medicine to treat high blood pressure, cholesterol, diabetes or atrial fibrillation.    Understanding Stroke Booklet given to patient. Please refer to booklet for further information.    Stroke warning signs and symptoms - CALL 911 right away for:  - Sudden numbness or weakness in the face, arm or leg (often on one side of the body).  - Sudden confusion or trouble understanding what is going on.  - Sudden blurred or decreased vision in one or both eyes.  - Sudden trouble speaking, loss of balance, dizziness or problems with coordination.  - Sudden, severe headache for no reason.  - Fainting or seizures.  - Symptoms may go away then come back suddenly.     Adult [facility] Follow-up and recommended labs and tests   Follow up with primary care  provider, OCHOA HAN, within 7 days to evaluate medication change.  No follow up labs or test are needed.      Appointments on [place] and/or [facility] (with [facility] or [facility] provider or service). Call [phone] if you haven't heard regarding these appointments within 7 days of discharge.     Follow Up and recommended labs and tests   Please contact Stroke Clinic- Neurology Clinic at [phone], pick option #1,  if you have not been contacted to schedule a stroke follow up appointment in 5 days of discharge.  If you have other stroke related questions, please call [phone], pick option #3, and ask to speak to Jian Wong RN Stroke/Endovascular Care Coordinator.  If you have any urgent stroke related questions after hours, please contact the hospital  at [phone] and ask to be connected to a stroke provider.     Discharge Instructions     Full Code     Diet   Follow this diet upon discharge: Orders Placed This Encounter     Calorie Counts     Snacks/Supplements Adult: Ensure Plus (Adult); Between Meals     Regular Diet Adult       Patient was seen and discussed with the Attending, Dr. Macdonald.    Note written with the help Anat Duncan Neurology PGY1.    Alejandro Mitchell MD  Neurology PGY2  [phone]

## 2017-09-12 NOTE — PLAN OF CARE
Problem: Goal Outcome Summary  Goal: Goal Outcome Summary  Outcome: No Change     Pt here s/p stroke. MRI completed this shift, premedicated with Ativan. VSS, sl HTN at beginning of shift but w/in parameters. On continuous cardiac monitoring, NSR. Pt very anxious and restless at beginning of shift, settled down and slept for about 30 minutes after family left before MRI. Pt lethargic after returning from MRI so neuros more difficult to assess. Pt did wake up and void, then went back to sleep. Stated yes, wanted to eat but has been sleeping so courtesy meal in refrigerator. Pt to be NPO at midnight for possible PARVEZ tomorrow. Norco available for pain. Pt has not c/o HA since Ativan given. Pt's daughters have significant concerns about pt's social situation and pt's safety when DC'd. Social work consult requested. Pt had PLC with family present this shift. Cont POC.

## 2017-09-12 NOTE — PROGRESS NOTES
Kj Guillory    Spoke w/ daughter Celina, who lives in [place]. Pt has been living with her for the past 10 years on and off. Sowmya has been in [place] for about a month now, living with a man named Krissy (?). Celina would like to have pt go back to [place] to live with her but she's concerned about the poor quality of healthcare in her small town, as well as Sowmya's inability to travel by herself. Celina also worries that if Sowmya doesn't go to [place], she will assume Celina does not want to care for her and be mad at her.     Reassured Celina that we are working on a safe discharge plan. Perhaps the local daughters can help care for patient temporarily.     Cecile Chris

## 2017-09-12 NOTE — PLAN OF CARE
Problem: Goal Outcome Summary  Goal: Goal Outcome Summary  Outcome: Improving  VSS. Neuro intact ex. expressive aphasia which is improving gradually. Pt. was not complient with neuro exam in a.m. due to frustration related to communication difficulty, was cooperative with noon neuro. Pt. migraines controlled with norco q6h. Had PRAVEZ done at 2 pm and tolerated well. CT with contrast will be done in raghavendra, needs to be NPO 2 hours prior too. Will be NPO after lunch as she did not eat in morning. Voids spontaneously, up with assist of 1.

## 2017-09-12 NOTE — PLAN OF CARE
Problem: Goal Outcome Summary  Goal: Goal Outcome Summary  SLP session cancelled: Pt is off the unit for a procedure. Will reschedule tx for later this afternoon if able or for tomorrow.

## 2017-09-12 NOTE — PLAN OF CARE
Problem: Goal Outcome Summary  Goal: Goal Outcome Summary  Outcome: No Change  Patient continues with stroke w/u. NPO today for PARVEZ. IV infusing at 75cc/ hr. Continues with expressive asphasia. Very frustrated with neuro checks and being unable to say what she wants. Disoriented to time with most checks. MERY strong. Has difficulty following some commands due to sleepiness and aphasia. Vitals stable ,AF. Cardiac monitoring with NSR and occasional PVC. Voiding with SBA to bathroom.Daughter called to follow up on her and will call back later today. Well ware of patients [place] to needed cares.Continue to monitor and treat per plan of care

## 2017-09-12 NOTE — PROGRESS NOTES
[facility], [place]   Neurology Daily Note  Sowmya Gibbs  [id number]  09/12/2017    Subjective: Patient continues to feel frustrated with her stroke.     Objective   /80  Pulse 63  Temp 97.7  F (36.5  C) (Oral)  Resp 16  Wt 55.7 kg (122 lb 12.7 oz)  LMP 01/25/2003  SpO2 99%  BMI 20.43 kg/m2  General: Adult, cooperative   HEENT: NC/AT, no icterus, pink and moist  Cardiac: RRR no M  Chest: CTAB no w/c/r  Abdomen: S/NT/ND  Extremities: No LE swelling.  Skin: No rash or lesion.   Psych: normal mood and affect  Neuro:  Mental status: Awake, alert, attentive, oriented to place. Speech is aphasic, able to follow simple commands, improved from yesterday. Difficulty with 2-step or more complex commands. Impaired repetition. No dysarthria.  Cranial nerves: CN2-12 tested and no significant findings appreciated. Eyes conjugate, PERRLA, EOMI, visual fields full, face symmetric, facial sensation intact, shoulder shrug strong, palate rise symmetric, tongue/uvula midline, hearing intact to conversation.   Motor: Tone normal. 5/5 strength in all 4 extremities. No atrophy or twitches. No pronator drift present.    Reflexes: Reflexic and symmetric biceps, brachioradialis, patellar.  Sensory: Intact to LT  x 4 extremities  Coordination: FNF no dysmetria    Investigations    Results for orders placed or performed during the hospital encounter of 09/10/17 (from the past 24 hour(s))   Glucose by meter   Result Value Ref Range    Glucose 138 (H) 70 - 99 mg/dL   MRA Venogram Brain w/o Cont Angiogram    Narrative    MRV of the head without contrast    History:  Headache with temporal stroke.  Comparison:  9/10/2017.      Technique:   Head MRV: 2D time-of-flight MR venogram (MRV) of the head was  performed without intravenous contrast.       Findings:   Head MRI: Please see separate dictation with stroke protocol regarding  additional intracranial findings.    Head MRV demonstrates no definite  thrombosis or stenosis of the major  intracranial dural sinuses or deep cerebral veins.       Impression    Impression:  Head MRV demonstrates patent major dural and deep venous sinuses  intracranially.    I have personally reviewed the examination and initial interpretation  and I agree with the findings.    BILL TORRES MD   MR Brain COW Carotid w/o Contrast    Narrative    MRI brain without contrast  MRA of the head without contrast  Neck MRA without contrast    Provided History:  Stroke.    Comparison:  9/10/2017.      Technique:   Brain MRI:  Axial diffusion, FLAIR, T2-weighted, susceptibility, and  coronal T1-weighted images were obtained without intravenous contrast.    Head MRA: 3D time-of-flight MRA of the [place] was performed  without intravenous contrast.  Neck MRA:  Limited non contrast 2DTOF images were obtained of the  mid-cervical region.   Three-dimensional reconstructions of the neck and head MRA were  created, which were reviewed by the radiologist.    Findings:   Brain MRI: Axial diffusion weighted images demonstrate acute infarct  of the anterior and middle left temporal lobe. Chronic-appearing  lacunar infarcts of the left insula, right cerebellar hemisphere, and  posteromedial right occipital lobe. Minimal encephalomalacia  associated with the right cerebellar infarcts. On the FLAIR images,  there is nonspecific mild periventricular T2-hyperintensity, which are  most likely related to chronic small vessel ischemic disease. There is  no definite intracranial hemorrhage on susceptibility images.  Ventricles are proportionate to the cerebral sulci.     Head MRA demonstrates decreased filling of inferior left M3 branches  of the middle cerebral artery, which appeared to correspond with the  infarct. No aneurysm noted. Suspect infundibulum of the left  inferolateral trunk, possibly on the right as well. The anterior  communicating artery is patent. Grossly patent right middle  cerebral  artery. The posterior circulation appears intact.     Neck MRA demonstrates patent major cervical arteries. Less than 50%  stenosis of the proximal internal carotid arteries bilaterally. The  normal distal right internal carotid artery measures 4 mm. The normal  distal left internal carotid artery measures 4 mm. Antegrade flow in  the major cervical vasculature.      Impression    Impression:  1. Redemonstration of acute infarct involving the anterior and middle  left temporal lobe. Head MRA is notable for diminished flow related  signal in the left middle cerebral artery territory, particularly in  an inferior M3 branch.  2. Chronic-appearing infarcts of the left insula, right medial  occipital lobe, and right cerebellar hemisphere. Superimposed moderate  chronic small vessel ischemic disease.  3. Neck MRA demonstrates patent major cervical arteries.    I have personally reviewed the examination and initial interpretation  and I agree with the findings.    BILL TORRES MD   Glucose by meter   Result Value Ref Range    Glucose 92 70 - 99 mg/dL   Glucose by meter   Result Value Ref Range    Glucose 103 (H) 70 - 99 mg/dL   Methicillin Resistant Staph Aureus PCR   Result Value Ref Range    Specimen Description Nares     S Aur Meth Resis PCR Negative NEG^Negative   Basic metabolic panel   Result Value Ref Range    Sodium 142 133 - 144 mmol/L    Potassium 4.6 3.4 - 5.3 mmol/L    Chloride 113 (H) 94 - 109 mmol/L    Carbon Dioxide 22 20 - 32 mmol/L    Anion Gap 6 3 - 14 mmol/L    Glucose 88 70 - 99 mg/dL    Urea Nitrogen 20 7 - 30 mg/dL    Creatinine 1.29 (H) 0.52 - 1.04 mg/dL    GFR Estimate 41 (L) >60 mL/min/1.7m2    GFR Estimate If Black 49 (L) >60 mL/min/1.7m2    Calcium 8.6 8.5 - 10.1 mg/dL   Glucose by meter   Result Value Ref Range    Glucose 81 70 - 99 mg/dL       Assessment and Plan   Sowmya Gibbs is a 71 year old year old right handed female with multiple stroke risk factors including HTN, DMII,  HLD, previous strokes and a history of RCC s/p partial nephrectomy, CKD who presented on 9/10/2017 with an acute onset aphasia found to have anterior and middle left temporal lobe acute infarcts.     #Anterior and middle left temporal lobe acute infarcts  - At this point, etiology of her acute ischemic stroke and previous CVA's remains unclear.  - PARVEZ pending  - ESR was elevated at 43  - CRP was wnl, d-dimer minimally elevated at 0.8  - Anti-cardiolipin negative, beta-2 glycoprotein and LA negative  - Continue speech therapy  - PT/OT deferred as able to walk with good balance and no concern for impairment in ADLs  - PM&R assessment  - Continue ASA + Plavix  -  CT C/A/P    #Headaches  - Patient was on hydrocodone-acetaminophen 7.5-325mg PRN PTA, to continue    #HTN  - Continue carvidelol 12.5mg  - Labetalol 10mg PRN    #HLD  - Lipid profile: HDL 28 (L),  (H), LDL 88  - Continue Crestor 10mg     #DMII  - POC glucose range   - Continue consistent carbs and glucose checks  - A1c pending  - Currently not on insulin    #Hypothyroidism  - Continue L-thyroxine 88mcg    #CKD  - Cr level is 1.6 today (ranged between 1.6 - 1.8 in 2015)  - Was on NaHCO3 at home, discontinued today  - IV fluids 75ml/hr   - Repeat BMP in AM    FEN: 75ml/hr  PPx: Heparin 5,000 U  Code: FULL  Dispo: Pending    Patient's condition was explained and discussed with patient and family (daughters).    Patient was seen and discussed with Stroke fellow Osmin Vail and with the staff, Dr. Dylan Paredes.    Kedar Crockett MD  Neurology resident, PGY-2  (P) [phone]

## 2017-09-12 NOTE — PROGRESS NOTES
Pt arrived in ECHO department  for scheduled PARVEZ.   Procedure explained, questions answered and consent signed.  Pt's throat sprayed at 1230, therefore pt will not be able to eat or drink until 2 hours after at 1430.  Informed pt of this time and encouraged to start with warm fluids and soft foods.    Pt tolerated procedure well, and was given a total of 50 mcg IV fentanyl and 2 mg IV versed for conscious sedation.  Pt denied throat or chest pain after PARVEZ complete.   PARVEZ probe 4 used for procedure.  Pt denied C.P or throat pain after procedure and was transferred back to 6A after awake and VSS.  Escorted back to 6A on litter.  Report given to 6A RN.

## 2017-09-13 ENCOUNTER — APPOINTMENT (OUTPATIENT)
Dept: OCCUPATIONAL THERAPY | Facility: CLINIC | Age: 72
DRG: 040 | End: 2017-09-13
Attending: STUDENT IN AN ORGANIZED HEALTH CARE EDUCATION/TRAINING PROGRAM
Payer: MEDICARE

## 2017-09-13 LAB
ANION GAP SERPL CALCULATED.3IONS-SCNC: 9 MMOL/L (ref 3–14)
BUN SERPL-MCNC: 21 MG/DL (ref 7–30)
CALCIUM SERPL-MCNC: 9 MG/DL (ref 8.5–10.1)
CHLORIDE SERPL-SCNC: 108 MMOL/L (ref 94–109)
CO2 SERPL-SCNC: 23 MMOL/L (ref 20–32)
CREAT SERPL-MCNC: 1.39 MG/DL (ref 0.52–1.04)
GFR SERPL CREATININE-BSD FRML MDRD: 37 ML/MIN/1.7M2
GLUCOSE SERPL-MCNC: 66 MG/DL (ref 70–99)
PLATELET # BLD AUTO: 241 10E9/L (ref 150–450)
POTASSIUM SERPL-SCNC: 4.4 MMOL/L (ref 3.4–5.3)
SODIUM SERPL-SCNC: 140 MMOL/L (ref 133–144)

## 2017-09-13 PROCEDURE — 25000132 ZZH RX MED GY IP 250 OP 250 PS 637: Mod: GY | Performed by: STUDENT IN AN ORGANIZED HEALTH CARE EDUCATION/TRAINING PROGRAM

## 2017-09-13 PROCEDURE — 99221 1ST HOSP IP/OBS SF/LOW 40: CPT | Performed by: PSYCHIATRY & NEUROLOGY

## 2017-09-13 PROCEDURE — 80048 BASIC METABOLIC PNL TOTAL CA: CPT | Performed by: PSYCHIATRY & NEUROLOGY

## 2017-09-13 PROCEDURE — A9270 NON-COVERED ITEM OR SERVICE: HCPCS | Mod: GY | Performed by: STUDENT IN AN ORGANIZED HEALTH CARE EDUCATION/TRAINING PROGRAM

## 2017-09-13 PROCEDURE — 97165 OT EVAL LOW COMPLEX 30 MIN: CPT | Mod: GO | Performed by: OCCUPATIONAL THERAPIST

## 2017-09-13 PROCEDURE — 25000132 ZZH RX MED GY IP 250 OP 250 PS 637: Mod: GY | Performed by: PSYCHIATRY & NEUROLOGY

## 2017-09-13 PROCEDURE — 36415 COLL VENOUS BLD VENIPUNCTURE: CPT | Performed by: PSYCHIATRY & NEUROLOGY

## 2017-09-13 PROCEDURE — 40000133 ZZH STATISTIC OT WARD VISIT: Performed by: OCCUPATIONAL THERAPIST

## 2017-09-13 PROCEDURE — 12000003 ZZH R&B CRITICAL UMMC

## 2017-09-13 PROCEDURE — 85049 AUTOMATED PLATELET COUNT: CPT | Performed by: PSYCHIATRY & NEUROLOGY

## 2017-09-13 PROCEDURE — 97535 SELF CARE MNGMENT TRAINING: CPT | Mod: GO | Performed by: OCCUPATIONAL THERAPIST

## 2017-09-13 PROCEDURE — A9270 NON-COVERED ITEM OR SERVICE: HCPCS | Mod: GY | Performed by: PSYCHIATRY & NEUROLOGY

## 2017-09-13 RX ORDER — ATORVASTATIN CALCIUM 40 MG/1
80 TABLET, FILM COATED ORAL DAILY
Status: DISCONTINUED | OUTPATIENT
Start: 2017-09-14 | End: 2017-09-21 | Stop reason: HOSPADM

## 2017-09-13 RX ORDER — IBUPROFEN 600 MG/1
600 TABLET, FILM COATED ORAL ONCE
Status: COMPLETED | OUTPATIENT
Start: 2017-09-13 | End: 2017-09-13

## 2017-09-13 RX ORDER — IBUPROFEN 200 MG
400 TABLET ORAL EVERY 6 HOURS PRN
Status: DISCONTINUED | OUTPATIENT
Start: 2017-09-13 | End: 2017-09-14

## 2017-09-13 RX ORDER — LANOLIN ALCOHOL/MO/W.PET/CERES
500 CREAM (GRAM) TOPICAL DAILY
Status: COMPLETED | OUTPATIENT
Start: 2017-09-13 | End: 2017-09-15

## 2017-09-13 RX ADMIN — Medication 500 MG: at 09:12

## 2017-09-13 RX ADMIN — CLOPIDOGREL 75 MG: 75 TABLET, FILM COATED ORAL at 09:10

## 2017-09-13 RX ADMIN — LORAZEPAM 1 MG: 1 TABLET ORAL at 18:12

## 2017-09-13 RX ADMIN — ACETAMINOPHEN 650 MG: 325 TABLET ORAL at 16:52

## 2017-09-13 RX ADMIN — ASPIRIN 81 MG: 81 TABLET, COATED ORAL at 09:12

## 2017-09-13 RX ADMIN — LORAZEPAM 1 MG: 1 TABLET ORAL at 20:02

## 2017-09-13 RX ADMIN — ROSUVASTATIN CALCIUM 10 MG: 10 TABLET, FILM COATED ORAL at 09:09

## 2017-09-13 RX ADMIN — HYDROCODONE BITARTRATE AND ACETAMINOPHEN 2 TABLET: 7.5; 325 TABLET ORAL at 09:09

## 2017-09-13 RX ADMIN — CARVEDILOL 12.5 MG: 3.12 TABLET, FILM COATED ORAL at 09:09

## 2017-09-13 RX ADMIN — HYDROCODONE BITARTRATE AND ACETAMINOPHEN 2 TABLET: 7.5; 325 TABLET ORAL at 15:14

## 2017-09-13 RX ADMIN — IBUPROFEN 600 MG: 600 TABLET ORAL at 15:14

## 2017-09-13 RX ADMIN — LEVOTHYROXINE SODIUM 88 MCG: 88 TABLET ORAL at 09:09

## 2017-09-13 ASSESSMENT — ACTIVITIES OF DAILY LIVING (ADL): PREVIOUS_RESPONSIBILITIES: MEAL PREP;HOUSEKEEPING;LAUNDRY;SHOPPING;YARDWORK;MEDICATION MANAGEMENT;FINANCES;DRIVING

## 2017-09-13 NOTE — CONSULTS
PSYCHIATRIC CONSULTATION       TODAY'S DATE:  09/13/2017        IDENTIFICATION:  Ms. Sowmya Gibbs is a 71-year-old white female who is currently hospitalized after a stroke.  I am asked to evaluate her psychiatric status by Dr. Chris.      HISTORY OF PRESENT ILLNESS:  Ms. Gibbs apparently made some suicidal statements to family members and she also has been asking to sign out AMA.  The treatment team put her on a 72-hour hold.  On my interview the patient has consistently denied suicidal ideation.  She has denied suicidal ideation or intent to the nursing staff.  That does not seem to be a big issue.  What does seem to be a bigger issue is her goal of just leaving the hospital.  The treatment team reports that with her aphasia, it is difficult for them to determine what she does and does not understand.  She is refusing most cares including speech therapy.  She has often placed a sheet over her head and refuses to answer questions.  On my interview she was much more reasonable, frequently tearful but denied suicidal ideation.  It was, as the treatment team noted, difficult to understand exactly what this patient can and cannot comprehend.  She apparently has a vegetation on one of her heart valves and that has not yet been worked up.  She has not had a cognitive evaluation, so it is very difficult to determine how big of a risk she is outside the hospital.  Family clearly does not want her discharged.  They feel that her current discharge plan is to go with a boyfriend who just simply dropped her off at the emergency room and did not return.  Family believes this is a dangerous discharge plan.      To make things even more complicated, this patient does have a history of overdose, usually on narcotics or sleeping meds.  She has at least 3 overdoses since 2004 and has carried the diagnosis of major depressive disorder.  She tells me that she is not currently depressed and does not need antidepressants.  At  the present time, I do not believe antidepressant medications would be particularly helpful.  Given the fact that she is on a 72-hour hold, decisions will need to be made quite rapidly but at the present time, I do not believe I have enough information to know 1, how much she understands and 2, whether or not she is in significant danger when she leaves the hospital.  Hopefully, she will participate in cognitive evaluation and hopefully cardiac surgery will make a determination as to whether or not she needs urgent surgery so I am hopeful that this situation will clarify itself in the next 24 hours.  At that point, we may need to reconsult Psychiatry for a capacity evaluation.      PAST MEDICAL HISTORY:  Claudication, major depression, overdoses on sleeping meds, Fioricet and narcotics in the past, history of nephrectomy for renal cancer, tobacco use, also the patient has had previous strokes.  She reports this was 12 years ago.      PAST SURGICAL HISTORY:  Includes cardiac stents and external iliac stent in 2014.      ALLERGIES:  Ceclor, codeine, Demerol, oxycodone, penicillins, Septra, sumatriptan, tramadol, tricyclic antidepressants and Valium.      FAMILY HISTORY:  The patient is unable to give a meaningful family history.  However, the chart suggests her daughter had hemiplegic migraines.      SOCIAL HISTORY:  The patient has been living with her boyfriend in [place].  Previous she was living with a daughter in [place].  She has a history of substance abuse with overdoses.  The chart says she is a former smoker; however, nursing staff believes she wants to go outside and smoke.      PHYSICAL REVIEW OF SYSTEMS:  Includes headache and aphasia.  She also reports double vision.  She denies problems with hearing, chest pain, shortness of breath, abdominal pain, diarrhea, constipation, genitourinary symptoms or problems with muscle, skin or joints.      MENTAL STATUS EXAMINATION:  Initially on my first interview,  the patient was pleasant and cooperative.  Her mood was reported as okay.  Her affect was full.  Her speech was difficult to understand.  However, she could answer most questions appropriately.  Her content of thought was without overt psychosis, and she denies suicidal ideation.  Recent and remote memory were difficult to assess.  Concentration seemed intact.  Use of language was abnormal.  She was alert and has been oriented x3.  The chart suggests that she has had paraphasias and neologisms but does follow commands.  Insight and judgment are guarded.  Muscle strength and tone appear somewhat weak but this does not seem to be acute.  Recent vital signs include a pulse of 66, respirations 16 with 98% oxygen saturation and a blood pressure of 157/69.      ASSESSMENT:  Aphasia, recent stroke, history of major depressive disorder, history of substance abuse.      RECOMMENDATIONS:  Please order OT for cognitive.  If questions remain about discharge and we have as much information from Cardiology and Occupational Therapy as we need, then please reconsult Psychiatry for a potential capacity evaluation.         ANTOINETTE MARIA MD             D: 2017 11:37   T: 2017 12:16   MT: PETER      Name:     JOLLY EDWARDS   MRN:      [id number]        Account:       [id number]   :      1945           Consult Date:  2017      Document: [id number]       cc: MARQUISE ZUÑIGA MD

## 2017-09-13 NOTE — PLAN OF CARE
Problem: Goal Outcome Summary  Goal: Goal Outcome Summary  Outcome: No Change  Patient admitted for stroke. VSS. Difficult to assess orientation d/t aphasia/not speaking. Did not complete full neuro assessment overnight per orders. Neuros include generalized weakness, aphasia - did not speak much overnight. On suicide precautions; 72 hour hold in place until 8:45 PM on 9/15. 1:1 sitter. Slept most of night. Regular diet. No IV access as patient pulled IV out on evening shift. Up SBA, slightly unsteady. Voiding spontaneously. Cardiology and Psych consults. Will continue to monitor.

## 2017-09-13 NOTE — PLAN OF CARE
"Problem: Goal Outcome Summary  Goal: Goal Outcome Summary  Outcome: No Change  VSS on RA. Unable to assess orientation d/t aphasia. Neuros intact, pt uncooperative & refused second neuro assessment of shift - MD present & aware. Pt uncooperative with all cares at 2000, pt agitated & asking to leave AMA multiple times. Pt signed AMA paperwork, MD discussed implication of leaving hospital, pt then stated, \"Fine then I'll stay, just leave me alone.\" Family updated, daughters reported suicidal comments by pt yesterday & today. Daughters reported pt stated, \"I will find a way to die\" & \"I want to kill myself\". 72hr hold placed. Suicide cart outside of room, checklist completed, & paperwork filled out. Copy of 72hr hold at pt's bediside, reviewed with pt, & answered questions. Pt removed PIV, when pt more cooperative place order for new PIV in AM. Pt c/o headache. Received Ativan x1 & Norco x1, both effective. CT completed this evening, cardiology to stop by tomorrow. Order placed for no VS or neuro assessments overnight. Psych consult placed. Regular diet order in place, pt stating \"I'm starving, I haven't eaten in 3 days\", multiple food options offered, pt refused all. Courtesy meal provided & at bedside, pt refusing to eat. 1:1 sitter.           "

## 2017-09-13 NOTE — PROGRESS NOTES
CVTS Brief Note:     CVTS aware of consult for mobile mass on mitral valve. Dr. Allen aware, will see this afternoon.     Justin Reed PA-C  Cardiothoracic Surgery  September 13, 2017 9:10 AM   p: [phone]

## 2017-09-13 NOTE — PLAN OF CARE
Problem: Goal Outcome Summary  Goal: Goal Outcome Summary  D/I: patient remains on 6A following stroke ischemic middle left temporal lobe  Neuro- Expressive aphasia, generalized weakness. Pt frustrated with stroke and aphasia, will be generally uncooperative with cares, interventions and assessments.  CV- WNL  Pulm- On RA, sats in mid-high 90s. Did not let writer auscultate LS  GI- Tolerated regular diet, no BM today per pt.   - Voiding without difficulty.  Skin- MARIAN full skin, exposed areas have generalized bruising, intact otherwise  Gtts- No PIV access, ok'd by MD.  ID- n/a  Labs- WNL  Pain- Pt c/o HA relieved by norco, tylenol, and ibuprofen. Pt sleeps between cares but c/o HA when asked.  Activity- up with SBA, refuses GB  Drains-n/a  Social-daughters involved, Celina lives in [place] and is primary caregiver, Kristy is present at bedside.  CRRT-n/a  See flow sheets for further details and assessments.  A: Pt on 1:1 for 72 hr hold, calm most of day until daughter arrived to visit at 1800, 1 mg ativan given, awaiting effectiveness. Awaiting discharge plans, read OT notes.  P: continue to monitor, notify MD of significant changes.

## 2017-09-13 NOTE — PLAN OF CARE
Problem: Goal Outcome Summary  Goal: Goal Outcome Summary  OT/6A: OT evaluation completed. Attempted cognitive testing although difficult to formally assess 2/2 aphasia. Pt alert, oriented x3, visibly upset and using expletives to communicate frustration.  Pt able to follow all mobility commands and routine tasks such as writing her name.  Administered portions of MoCA with modifications due to language deficits. Pt with apparent difficulty during cognitive tasks presented not related solely to language/aphasia. Pt unable to complete alternating number/letter task, copying task and clock drawing task although attempting appropriately.  Briefly assessed mobility, pt up SBA in room without AD, no apparent balance deficits. Pt refusing ADL tasks. OT to follow to continue monitoring cognition and impact on safety and return to ADLs.      REC: At this time would rec. 24 hour assistance from supportive and reliable caregiver vs. TCU. If discharging home, will require OP SLP to address language deficits and cognition. Pt will also benefit from OP OT following SLP treatment when pt more willing/appropriate to work on compensation strategies with ADL/IADL performance.

## 2017-09-13 NOTE — PLAN OF CARE
Problem: Goal Outcome Summary  Goal: Goal Outcome Summary  SLP: Communication treatment cancelled.  Pt strongly declined participation stating treatment is 'bullshit.'  ST to follow as indicated on POC; frequency reduced given poor participation.

## 2017-09-13 NOTE — PROGRESS NOTES
" 09/13/17 1614   Quick Adds   Type of Visit Initial Occupational Therapy Evaluation   Living Environment   Lives With friend(s)  (\"Don\")   Living Arrangements house   Living Environment Comment Pt providing very limited prior level of function. Reports living in [place] with friend \"Don\" and previously IND with all ADLs/IADLs. Pt reports no stairs in current home.    Self-Care   Dominant Hand right   Usual Activity Tolerance good   Current Activity Tolerance fair   Regular Exercise no   Activity/Exercise/Self-Care Comment Pt reports no AE at baseline   Functional Level Prior   Ambulation 0-->independent   Transferring 0-->independent   Toileting 0-->independent   Bathing 0-->independent   Dressing 0-->independent   Eating 0-->independent   Communication 0-->understands/communicates without difficulty   Swallowing 0-->swallows foods/liquids without difficulty   Cognition 1 - attention or memory deficits   Fall history within last six months no   Which of the above functional risks had a recent onset or change? cognition;communication/speech   Prior Functional Level Comment Pt reports IND with all ADLs/IADLs including driving   General Information   Onset of Illness/Injury or Date of Surgery - Date 09/10/17   Referring Physician Kedar Crockett MD   Patient/Family Goals Statement \"get the fuck out of this place\"   Additional Occupational Profile Info/Pertinent History of Current Problem Sowmya Gibbs is a 71 year old year old right handed female with multiple stroke risk factors including HTN, DMII, HLD, previous strokes and a history of RCC s/p partial nephrectomy, CKD who presented on 9/10/2017 with an acute onset aphasia found to have anterior and middle left temporal lobe acute infarcts.    Precautions/Limitations (1:1 )   Weight-Bearing Status - LUE full weight-bearing   Weight-Bearing Status - RUE full weight-bearing   Weight-Bearing Status - LLE full weight-bearing   Weight-Bearing Status - RLE full " "weight-bearing   General Info Comments Activity: up ad santiago   Cognitive Status Examination   Orientation place;person   Level of Consciousness alert;agitated   Cognitive Comment Pt alert, very frustrated with expressive aphasia. Reports she feels like we are treating her like she is \"stupid.\" Pt unable to complete copying tasks, alternating letter/number task, or clock draing task indicating cognitive deficits beyond language. Will continue to monitor and assess   Visual Perception   Visual Perception Comments Pt reports no deficits, tracking WNL   Sensory Examination   Sensory Comments Reports no deficits   Pain Assessment   Patient Currently in Pain No   Range of Motion (ROM)   ROM Comment WNL bilaterally   Strength   Strength Comments 5/5 throughout   Muscle Tone Assessment   Muscle Tone Quick Adds No deficits were identified   Coordination   Coordination Comments finger to nose intact bilaterally   Mobility   Bed Mobility Comments IND   Transfer Skill: Bed to Chair/Chair to Bed   Level of [place]: Bed to Chair stand-by assist   Transfer Skill: Sit to Stand   Level of [place]: Sit/Stand stand-by assist   Eating/Self Feeding   Level of [place]: Eating independent   Instrumental Activities of Daily Living (IADL)   Previous Responsibilities meal prep;housekeeping;laundry;shopping;yardwork;medication management;finances;driving   Activities of Daily Living Analysis   Impairments Contributing to Impaired Activities of Daily Living cognition impaired   General Therapy Interventions   Planned Therapy Interventions ADL retraining;IADL retraining;cognition;home program guidelines   Clinical Impression   Criteria for Skilled Therapeutic Interventions Met yes, treatment indicated   OT Diagnosis Impaired cognition impacting ADL/IADL performane   Influenced by the following impairments cognition, participation   Assessment of Occupational Performance 3-5 Performance Deficits   Identified Performance Deficits " "driving, med management, cooking, finances   Clinical Decision Making (Complexity) Low complexity   Therapy Frequency 5 times/wk   Predicted Duration of Therapy Intervention (days/wks) 1 week   Anticipated Equipment Needs at Discharge (TBD)   Anticipated Discharge Disposition Home with Assist;Home with Outpatient Therapy;Transitional Care Facility   Risks and Benefits of Treatment have been explained. Yes   Patient, Family & other staff in agreement with plan of care Yes   Clinical Impression Comments Pt with significant expressive aphasia impacting ability to further assess cognition. Pt with apparent deficits in cognition beyond language deficits, participation limiting factor to achieve therapy goals.    [facility] [employer] TM \"6 Clicks\"   2016, Trustees of [facility], under license to [employer].  All rights reserved.   6 Clicks Short Forms Daily Activity Inpatient Short Form   [facility] AM-PAC  \"6 Clicks\" Daily Activity Inpatient Short Form   1. Putting on and taking off regular lower body clothing? 4 - None   2. Bathing (including washing, rinsing, drying)? 4 - None   3. Toileting, which includes using toilet, bedpan or urinal? 4 - None   4. Putting on and taking off regular upper body clothing? 4 - None   5. Taking care of personal grooming such as brushing teeth? 4 - None   6. Eating meals? 4 - None   Daily Activity Raw Score (Score out of 24.Lower scores equate to lower levels of function) 24   Total Evaluation Time   Total Evaluation Time (Minutes) 15     "

## 2017-09-14 ENCOUNTER — APPOINTMENT (OUTPATIENT)
Dept: OCCUPATIONAL THERAPY | Facility: CLINIC | Age: 72
DRG: 040 | End: 2017-09-14
Payer: MEDICARE

## 2017-09-14 LAB — PLATELET # BLD AUTO: 247 10E9/L (ref 150–450)

## 2017-09-14 PROCEDURE — A9270 NON-COVERED ITEM OR SERVICE: HCPCS | Mod: GY | Performed by: PSYCHIATRY & NEUROLOGY

## 2017-09-14 PROCEDURE — A9270 NON-COVERED ITEM OR SERVICE: HCPCS | Mod: GY | Performed by: STUDENT IN AN ORGANIZED HEALTH CARE EDUCATION/TRAINING PROGRAM

## 2017-09-14 PROCEDURE — 36415 COLL VENOUS BLD VENIPUNCTURE: CPT | Performed by: PSYCHIATRY & NEUROLOGY

## 2017-09-14 PROCEDURE — 12000003 ZZH R&B CRITICAL UMMC

## 2017-09-14 PROCEDURE — 25000132 ZZH RX MED GY IP 250 OP 250 PS 637: Mod: GY | Performed by: STUDENT IN AN ORGANIZED HEALTH CARE EDUCATION/TRAINING PROGRAM

## 2017-09-14 PROCEDURE — 40000133 ZZH STATISTIC OT WARD VISIT: Performed by: OCCUPATIONAL THERAPIST

## 2017-09-14 PROCEDURE — 99233 SBSQ HOSP IP/OBS HIGH 50: CPT | Performed by: PSYCHIATRY & NEUROLOGY

## 2017-09-14 PROCEDURE — 25000132 ZZH RX MED GY IP 250 OP 250 PS 637: Mod: GY | Performed by: PSYCHIATRY & NEUROLOGY

## 2017-09-14 PROCEDURE — 97535 SELF CARE MNGMENT TRAINING: CPT | Mod: GO | Performed by: OCCUPATIONAL THERAPIST

## 2017-09-14 PROCEDURE — 85049 AUTOMATED PLATELET COUNT: CPT | Performed by: PSYCHIATRY & NEUROLOGY

## 2017-09-14 RX ORDER — ACETAMINOPHEN 325 MG/1
325 TABLET ORAL EVERY 4 HOURS PRN
Status: DISCONTINUED | OUTPATIENT
Start: 2017-09-14 | End: 2017-09-14

## 2017-09-14 RX ORDER — IBUPROFEN 200 MG
400 TABLET ORAL 3 TIMES DAILY
Status: DISCONTINUED | OUTPATIENT
Start: 2017-09-14 | End: 2017-09-14

## 2017-09-14 RX ORDER — UREA 10 %
500 LOTION (ML) TOPICAL DAILY
Status: DISCONTINUED | OUTPATIENT
Start: 2017-09-14 | End: 2017-09-21 | Stop reason: HOSPADM

## 2017-09-14 RX ORDER — IBUPROFEN 200 MG
400 TABLET ORAL EVERY 6 HOURS PRN
Status: DISCONTINUED | OUTPATIENT
Start: 2017-09-14 | End: 2017-09-16

## 2017-09-14 RX ORDER — ACETAMINOPHEN 325 MG/1
650 TABLET ORAL EVERY 6 HOURS
Status: DISCONTINUED | OUTPATIENT
Start: 2017-09-14 | End: 2017-09-19

## 2017-09-14 RX ADMIN — HYDROCODONE BITARTRATE AND ACETAMINOPHEN 2 TABLET: 7.5; 325 TABLET ORAL at 14:37

## 2017-09-14 RX ADMIN — ASPIRIN 81 MG: 81 TABLET, COATED ORAL at 08:06

## 2017-09-14 RX ADMIN — CLOPIDOGREL 75 MG: 75 TABLET, FILM COATED ORAL at 08:05

## 2017-09-14 RX ADMIN — HYDROCODONE BITARTRATE AND ACETAMINOPHEN 2 TABLET: 7.5; 325 TABLET ORAL at 08:17

## 2017-09-14 RX ADMIN — Medication 500 MG: at 08:06

## 2017-09-14 RX ADMIN — HYDROCODONE BITARTRATE AND ACETAMINOPHEN 2 TABLET: 7.5; 325 TABLET ORAL at 20:10

## 2017-09-14 RX ADMIN — IBUPROFEN 400 MG: 200 TABLET, FILM COATED ORAL at 11:32

## 2017-09-14 RX ADMIN — CARVEDILOL 12.5 MG: 3.12 TABLET, FILM COATED ORAL at 19:00

## 2017-09-14 RX ADMIN — IBUPROFEN 400 MG: 200 TABLET, FILM COATED ORAL at 19:00

## 2017-09-14 RX ADMIN — LEVOTHYROXINE SODIUM 88 MCG: 88 TABLET ORAL at 08:06

## 2017-09-14 RX ADMIN — CARVEDILOL 12.5 MG: 3.12 TABLET, FILM COATED ORAL at 08:06

## 2017-09-14 RX ADMIN — ACETAMINOPHEN 650 MG: 325 TABLET ORAL at 19:03

## 2017-09-14 RX ADMIN — ATORVASTATIN CALCIUM 80 MG: 40 TABLET, FILM COATED ORAL at 08:06

## 2017-09-14 NOTE — CONSULTS
Cardiothoracic Surgery Consult Note    Reason for Consult: PARVEZ showing small mobile mass attached to A2 scallop of mitral valve, concern of whether this needs surgical intervention    HPI: Patient is a 71 year old female with a history of HTN, DMII, HLD, previous strokes and a history of RCC s/p partial nephrectomy, CKD who presented on 9/10/2017 with an acute onset aphasia found to have anterior and middle left temporal lobe acute infarcts. Patient underwent work-up, echo showed moderate mitral regurgitation. 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    Patient denies chest pain, SOB, lightheadedness, dizziness.     PMH:  Past Medical History:   Diagnosis Date     Arthritis      CAD (coronary artery disease) 3/26/2014     Coronary artery disease      Coronary artery disease involving native coronary artery without angina pectoris 3/26/2015     Hypertension      Malignant neoplasm (H)      Thyroid disease      Type 2 diabetes mellitus, controlled, with renal complications (H) 1/5/2015     Type 2 diabetes, HbA1C goal < 8% (H) 1/5/2015     Unspecified cerebral artery occlusion with cerebral infarction        PSH:  Past Surgical History:   Procedure Laterality Date     KIDNEY SURGERY  06/23/2004    cancer, kidney partial removal       FH:  Family History   Problem Relation Age of Onset     HEART DISEASE Mother      Parkinsonism Father      HEART DISEASE Father      HEART DISEASE Sister      Family History Negative Other      females heart disease       SH:  Social History     Social History     Marital status:      Spouse name: N/A     Number of children: N/A     Years of education: N/A     Social History Main Topics     Smoking status: Former Smoker     Types: Cigarettes     Quit date: 2/4/2016     Smokeless tobacco: Never Used      Comment: patient smokes about 3 cigarettes per day      Alcohol use No     Drug use: No     Sexual activity: No     Other Topics Concern     Parent/Sibling W/ Cabg, Mi Or Angioplasty Before 65f 55m? Yes     mother and sisters     Social History Narrative    Surgical History  Return To Top     Status Surgery Time Frame Comment Record Date     Inactive  laminectomy  1976, 1986 4/14/2008      Inactive  Kidney surgery  6/23/04  Cancer, kidney partial removal  4/14/2008          --------------------------------------------------------------------------------    Food Allergy  Return To Top     Allergen Reaction Comment Record Date     * No known food allergies      9/28/2007          --------------------------------------------------------------------------------    Drug Allergy  Return To Top     Allergen Reaction Comment Record Date     TRICYCLIC ANTIDEPRESSANT    HYPER CRAZY  12/3/2008      Oxycodone  pruritis    12/3/2008      Valium      4/14/2008      SOMATROPIN  Could not walk or talk    4/14/2008      CODEINE      4/14/2008      DEMEROL      4/14/2008      SEPTRA      4/14/2008      Ceclor      4/14/2008      Penicillin      4/14/2008      Ultram  GI distress    4/14/2008          --------------------------------------------------------------------------------    Environment Allergy  Return To Top     Allergen Reaction Comment Record Date     * No known environmental allergies      9/28/2007          --------------------------------------------------------------------------------    Social History  Return To Top     Question Answer Comment Record Date     Marital status      9/28/2007      Emotional Abuse  No    9/7/2012      Exercise      9/28/2007      Caffeine  Yes    9/7/2012      Physical Abuse  No    9/7/2012      Sealtbelts      9/28/2007      Sexual Abuse  No    9/7/2012      Breast/Testicle Self Check      9/28/2007      Number of children  3    12/7/2007      Living arrangements  Apartment/Condo    9/28/2007      Number of children in household  0     2007      Number of adults in household  1    2007      Education level  College Graduate    2007      Employment  Currently employed    2007      Tobacco history  Quit less than 3 years ago    2007      Tobacco history  Currently smokes tobacco    2007      Number of years using tobacco  20    2007      Number of cigarettes/day  1  a 6-7 days when under stress  2007      Alcohol history  Never drinks alcohol    2007      Has the patient used marijuana?  No    2012      Has the patient used cocaine?  No    2012          --------------------------------------------------------------------------------    Medical History Return To Top     Status Diagnosis Time Frame Comment Record Date     Active (458.29) - C - Hypotension, iatrogenic      2012      Active (285.1) - C - Anemia, acute blood loss      2012      Active (440.21) - C - Intermittent claudication    right calf  2012      Active (440.0) - C - AORTIC ATHEROSCLEROSIS    with heavy calcifiaction  2012      Active (413.9) - C - Angina pectoris, NOS    home stressors difficulties in present living situation exacerbating this  2012      Active (435.9) - C - Transient ischemic attack, unspecified      2012      Active (414.01) - C - Coronary atherosclerosis, native coronary artery    STENTS X 2  AND 2012      Active (189.9) - C - Urinary cancer      2011      Active (276.8) - C - Hypokalemia      2011      Active (780.52) - C - Insomnia      2007      Active (401.1) - C - Hypertension, benign      2007      Active 305.1 Tobacco abuse      of lung cancer  2007      Active (244.9) - C - Hypothyroidism      2007      Active 346.00 Migraine, classic, not intractable      2007      Active 733.02 IDIOPATHIC OSTEOPOROSIS      2007      Active (780.79) - C - Fatigue      2007      Active 435.9 Transient ischemic attack,  unspecified    4 small strokes with aphasia transient  8/14/2007      Active (304.90) - C - Drug dependence, unspecified    darvon  8/14/2007      Active 189.9 Urinary cancer    small left kidney  8/14/2007      Active 346.80 MIGRAINE OT NOT INTRACTABLE      8/14/2007      Active (722.73) - C - Lumbar disc disorder w/myelopathy      8/14/2007      Active (311) - C - Depression      8/14/2007      Active (627.9) - C - Menopause      8/14/2007      Active (272.4) - C - Hyperlipidemia      8/14/2007      Inactive  (189.9) - C - Urinary cancer      4/22/2008      Inactive  (435.9) - C - Transient ischemic attack, unspecified      4/22/2008          --------------------------------------------------------------------------------    Medication History Return To Top         Isosorbide Mononitrate SR 30 mg 24 hr Tab, 1 Tablet(s), PO, daily, 30 days, 11 refills, for a total of 30, start on June 05, 2012 and end on May 30, 2013.     Aspirin 81 mg Tab, Delayed Release, 1 Tablet(s), PO, daily, 90 days, 3 refills, for a total of 90, start on April 06, 2012 and end on March 31, 2013.     Crestor 10 mg Tab, 1 Tablet(s), PO, daily, 30 days, 11 refills, for a total of 30, start on April 06, 2012 and end on March 31, 2013.     Levothyroxine 88 mcg Tab, 1 Tablet(s), PO, daily, 30 days, 11 refills, for a total of 30, start on April 06, 2012 and end on March 31, 2013.     Plavix 75 mg Tab, 1 Tablet(s), PO, daily, 30 days, 12 refills, for a total of 30, start on April 06, 2012 and end on April 30, 2013.     Potassium Chloride SR 10 mEq Cap, 1 Capsule(s), PO, daily, 30 days, 11 refills, for a total of 30, start on April 06, 2012, end on March 31, 2013 and every day.     Lyrica 50 mg Cap and Oral.     Vicodin 5 mg-500 mg Tab, 1 Tablet(s), PO, QID PRN, 30 days, for a total of 20, start on September 07, 2012, end on October 06, 2012 and prn migraine HA- #20 must last one month per Dr Knott.     Ambien 10 mg Tab, 1 Tablet(s), PO, QHS PRN,  30 days, 2 refills, for a total of 12, start on September 07, 2012 and end on December 05, 2012.     Zolpidem 10 mg Tab, 1 Tablet(s), PO, QHS PRN, 30 days, for a total of 20, start on July 26, 2012, end on August 24, 2012 and TAKE ONE TABLET BY MOUTH AT BEDTIME AS NEEDED FOR SLEEP TO LAST 30 DAYS (Appended: Controlled substance eRx refill - RxReferenceNumber: [id number]).     Hydrocodone-Acetaminophen 5 mg-500 mg Cap, 1 Capsule(s), PO, QID PRN, 30 days, for a total of 30, start on July 26, 2012, end on August 24, 2012 and ONE PO QID PRN MIGRAINE TO LAST ONE MONTH prn migraine.     Zolpidem 10 mg Tab, Tablet(s), PO, for a total of 15, start on June 20, 2012, end on July 25, 2012, TAKE ONE TABLET BY MOUTH AT BEDTIME AS NEEDED FOR SLEEP TO LAST 30 DAYS (Appended: Controlled substance eRx refill - RxReferenceNumber: [id number]) and discontinued because: Refilled.     Hydrocodone-Acetaminophen 5 mg-500 mg Cap, 1 Capsule(s), PO, QID PRN, 30 days, 1 refill, for a total of 30, start on June 07, 2012, end on July 25, 2012, ONE PO QID PRN MIGRAINE TO LAST ONE MONTH prn migraine and discontinued because: Refilled.     Zolpidem 10 mg Tab, 1 Tablet(s), PO, QHS PRN, 30 days, for a total of 15, start on June 07, 2012, end on June 20, 2012, prn sleep, discontinued because: Discontinued and Response to an electronic refill request for a controlled substance (Schedule IV) request - RxReferenceNumber: [id number].     Zolpidem 10 mg Tab, 1 Tablet(s), PO, QHS PRN, 30 days, for a total of 15, start on May 10, 2012, end on June 04, 2012, prn sleep and discontinued because: Refilled.     Hydrocodone-Acetaminophen 5 mg-500 mg Cap, 1 Capsule(s), PO, QID PRN, 30 days, for a total of 20, start on May 10, 2012, end on June 04, 2012, ONE PO QID PRN MIGRAINE TO LAST ONE MONTH prn migraine and discontinued because: Refilled.     Hydrocodone-Acetaminophen 5 mg-500 mg Cap, 1 Capsule(s), PO, QID PRN, 30 days, for a total of 20, start on April 06, 2012, end on  May 05, 2012, ONE PO QID PRN MIGRAINE TO LAST ONE MONTH prn migraine and discontinued because: Refilled.     Zolpidem 10 mg Tab, 1 Tablet(s), PO, QHS PRN, 30 days, for a total of 15, start on April 06, 2012, end on May 05, 2012, prn sleep and discontinued because: Refilled.     Sertraline 25 mg Tab, 2 Tablet(s), PO, daily, 30 days, 3 refills, for a total of 60, start on April 06, 2012, end on June 05, 2012, 1/2 TABLET WITH FOOD X 15 DAYS THEN 1 TABLET DAILY WITH FOOD THEN 1.5 TABLETS DAILY X 15 DAYS THEN 2 TABLETS DAILY and discontinued because: Deleted.     Losartan 100 mg Tab, 1 Tablet(s), PO, daily, 30 days, 5 refills, for a total of 30, start on April 06, 2012, end on September 07, 2012 and discontinued because: Deleted.     Hydrocodone-Acetaminophen 5 mg-500 mg Cap, 1 Capsule(s), PO, QID PRN, 30 days, for a total of 20, start on March 06, 2012, end on April 04, 2012, ONE PO QID PRN MIGRAINE TO LAST ONE MONTH and discontinued because: Refilled.     Zolpidem 10 mg Tab, Tablet(s), PO, QHS PRN, 30 days, for a total of 12, start on February 15, 2012, end on March 15, 2012 and TAKE ONE TABLET BY MOUTH AT BEDTIME AS NEEDED FOR SLEEP (Appended: Controlled substance eRx refill - RxReferenceNumber: [id number]).     Hydrocodone-Acetaminophen 5 mg-500 mg Cap, 1 Capsule(s), PO, QID PRN, 30 days, for a total of 20, start on February 13, 2012, end on March 05, 2012, ONE PO QID PRN MIGRAINE TO LAST ONE MONTH and discontinued because: Refilled.     Zolpidem 10 mg Tab, Tablet(s), PO, for a total of 12, start on February 09, 2012, end on February 14, 2012, TAKE ONE TABLET BY MOUTH AT BEDTIME AS NEEDED FOR SLEEP (Appended: Controlled substance eRx refill - RxReferenceNumber: [id number]) and discontinued because: Refilled.     Zolpidem 10 mg Tab, Tablet(s), PO, for a total of 12, start on February 06, 2012, end on February 09, 2012, TAKE ONE TABLET BY MOUTH AT BEDTIME AS NEEDED - MUST LAST 30 DAYS (Appended: Controlled substance eRx  refill - RxReferenceNumber: [id number]), discontinued because: Discontinued and Response to an electronic refill request for a controlled substance (Schedule IV) request - RxReferenceNumber: [id number].     Zolpidem 10 mg Tab, Tablet(s), PO, for a total of 12, start on February 06, 2012, end on February 06, 2012, TAKE ONE TABLET BY MOUTH AT BEDTIME AS NEEDED FOR SLEEP (Appended: Controlled substance eRx refill - RxReferenceNumber: [id number]), discontinued because: Discontinued and Response to an electronic refill request for a controlled substance (Schedule IV) request - RxReferenceNumber: [id number].     Zolpidem 10 mg Tab, Tablet(s), PO, 30 days, for a total of 12, start on December 22, 2011, end on February 06, 2012, TAKE ONE TABLET BY MOUTH AT BEDTIME AS NEEDED OV NEEDED), discontinued because: Discontinued and Response to an electronic refill request for a controlled substance (Schedule IV) request - RxReferenceNumber: [id number].     Vicodin 5 mg-500 mg Tab, 1 Tablet(s), PO, QID PRN, 30 days, for a total of 20, start on December 20, 2011, end on January 18, 2012, prn migraine HA- #20 must last one month per Dr Knott and discontinued because: Refilled.     Zolpidem 10 mg Tab, 1 Tablet(s), PO, QHS PRN, 30 days, for a total of 12, start on November 22, 2011, end on December 21, 2011 and TAKE ONE TABLET BY MOUTH AT BEDTIME AS NEEDED--per Dr Knott, #12 to last one month.     Vicodin 5 mg-500 mg Tab, 1 Tablet(s), PO, QID PRN, 30 days, for a total of 20, start on November 22, 2011, end on December 19, 2011, prn migraine HA- #20 must last one month per Dr Knott and discontinued because: Refilled.     Zolpidem 10 mg Tab, 1 Tablet(s), PO, QHS PRN, 30 days, for a total of 12, start on October 24, 2011, end on November 22, 2011, TAKE ONE TABLET BY MOUTH AT BEDTIME AS NEEDED--per Dr Knott, #12 to last one month, discontinued because: Discontinued and Response to an electronic refill request for a controlled substance  (Schedule IV) request - RxReferenceNumber: [id number].     Vicodin 5 mg-500 mg Tab, 1 Tablet(s), PO, QID PRN, 30 days, for a total of 20, start on October 24, 2011, end on November 21, 2011, prn migraine HA- #20 must last one month per Dr Knott and discontinued because: Refilled.     Vicodin 5 mg-500 mg Tab, 1 Tablet(s), PO, QID PRN, 28 days, for a total of 20, start on September 26, 2011, end on October 23, 2011, prn migraine HA- #20 must last one month per Dr Sharma and discontinued because: Refilled.     Zolpidem 10 mg Tab, 1 Tablet(s), PO, QHS PRN, 12 days, for a total of 12, start on September 24, 2011, end on October 24, 2011, TAKE ONE TABLET BY MOUTH AT BEDTIME AS NEEDED (Appended: Controlled substance eRx refill - RxReferenceNumber: [id number]), discontinued because: Discontinued and Response to an electronic refill request for a controlled substance (Schedule IV) request - RxReferenceNumber: [id number].     Zolpidem 10 mg Tab, Tablet(s), PO, for a total of 12, start on August 25, 2011, end on September 24, 2011, TAKE ONE TABLET BY MOUTH AT BEDTIME AS NEEDED (Appended: Controlled substance eRx refill - RxReferenceNumber: [id number]), discontinued because: Discontinued and Response to an electronic refill request for a controlled substance (Schedule IV) request - RxReferenceNumber: [id number].     Zolpidem 10 mg Tab, Tablet(s), PO, for a total of 12, start on August 25, 2011, end on December 21, 2011, TAKE ONE TABLET BY MOUTH AT BEDTIME AS NEEDED (Appended: Controlled substance eRx refill - RxReferenceNumber: [id number]) and discontinued because: Refilled.     Vicodin 5 mg-500 mg Tab, 1 Tablet(s), PO, QID PRN, 5 days, for a total of 20, start on August 25, 2011, end on August 29, 2011, prn migraine HA- #20 must last one month and discontinued because: Refilled.     Zolpidem 10 mg Tab, 1 Tablet(s), PO, QHS PRN, 30 days, for a total of 12, start on August 25, 2011, end on August 25, 2011, discontinued because: Discontinued and  Response to an electronic refill request for a controlled substance (Schedule IV) request - [facility] [id number].     Zolpidem 10 mg Tab, 1 Tablet(s), PO, QHS PRN, 30 days, for a total of 12, start on July 27, 2011, end on August 23, 2011 and discontinued because: Refilled.     Vicodin 5 mg-500 mg Tab, 1 Tablet(s), PO, QID PRN, 30 days, for a total of 20, start on July 27, 2011, end on August 24, 2011, prn migraine HA- #20 must last one month and discontinued because: Refilled.     Benicar 20 mg Tab, 1/2 Tablet(s), PO, daily, 30 days, 1 refill, for a total of 15, start on June 23, 2011 and end on August 21, 2011.     Vicodin 5 mg-500 mg Tab, 1 Tablet(s), PO, QID PRN, 30 days, for a total of 20, start on June 23, 2011, end on July 22, 2011, to last 3 months through July 2008 and discontinued because: Refilled.     Ambien 10 mg Tab, 1 Tablet(s), PO, QHS PRN, 30 days, for a total of 12, start on June 23, 2011, end on July 22, 2011 and discontinued because: Refilled.     Vicodin 5 mg-500 mg Tab, 1 Tablet(s), PO, QID PRN, 90 days, for a total of 60, start on May 09, 2011, end on June 22, 2011, to last 3 months through July 2008 and discontinued because: Refilled.     Ambien 10 mg Tab, 1 Tablet(s), PO, QHS PRN, 90 days, for a total of 30, start on May 09, 2011, end on June 22, 2011 and discontinued because: Refilled.     Ambien 10 mg Tab, 1 Tablet(s), PO, QHS PRN, 30 days, for a total of 21, start on April 15, 2011, end on May 08, 2011 and discontinued because: Refilled.     Ambien 10 mg Tab, 1 Tablet(s), PO, QHS PRN, 30 days, 2 refills, for a total of 21, start on March 02, 2011, end on April 14, 2011 and discontinued because: Refilled.     Crestor 10 mg Tab, 1 Tablet(s), PO, daily, 30 days, 11 refills, for a total of 30, start on February 25, 2011, end on February 19, 2012 and discontinued because: Refilled.     Levothroid 88 mcg Tab, 1 Tablet(s), PO, daily, 30 days, 11 refills, for a total of 30, start on  February 25, 2011 and end on February 19, 2012.     Vicodin 5 mg-500 mg Tab, 1 Tablet(s), PO, QID PRN, 2 refills, for a total of 20, start on February 25, 2011 and Must last for one month. OK per [facility] for #20, 0 RF's.     Vicodin 5 mg-500 mg Tab, 1 Tablet(s), PO, QID PRN, for a total of 20, start on January 25, 2011, Must last for one month. OK per [facility] for #20, 0 RF's and discontinued because: Refilled.     Vicodin 5 mg-500 mg Tab, 1 Tablet(s), PO, QID PRN, for a total of 60, start on November 01, 2010, Must last for 3 months. OK #60 with not RF's per [facility]. and discontinued because: Refilled.     Vicodin 5 mg-500 mg Tab, 1 Tablet(s), PO, PRN, 30 days, 2 refills, for a total of 20, start on July 29, 2010 and one po 4 x daily prn migraine.     Ambien 10 mg Tab, 1 Tablet(s), PO, QHS, 30 days, 2 refills, for a total of 30, start on July 29, 2010, end on October 26, 2010, 4/26/10 pt needs OV per [facility] and discontinued because: Refilled.     Crestor 10 mg Tab, 1 Tablet(s), PO, daily, 30 days, 11 refills, for a total of 30, start on July 29, 2010, end on February 24, 2011 and discontinued because: Refilled.     Levothroid 88 mcg Tab, 1 Tablet(s), PO, daily, 90 days, 3 refills, for a total of 90, start on July 29, 2010, end on February 24, 2011 and discontinued because: Refilled.     Vicodin 5 mg-500 mg Tab, 1 Tablet(s), PO, BID PRN, 30 days, for a total of 60, start on June 28, 2010, end on July 27, 2010 and discontinued because: Refilled.     Ambien 10 mg Tab, 1 Tablet(s), PO, QHS, for a total of 21, start on April 26, 2010, 4/26/10 pt needs OV per [facility] and discontinued because: Refilled.     Simvastatin 40 mg Tab, 1/2 Tablet(s), PO, daily, 30 days, 1 refill, for a total of 15, start on January 18, 2010, end on March 18, 2010 and NEED LDL AND SGOT WHEN ON THIS ONE MONTH.     Levothroid 88 mcg Tab, 1 Tablet(s), PO, daily, 90 days, for a total of 90, start on January 15, 2010, end on April 14, 2010 and discontinued because:  Refilled.     Ambien 10 mg Tab, 1 Tablet(s), PO, QHS, 30 days, for a total of 30, start on January 15, 2010, end on 2010 and discontinued because: Refilled.     Vicodin 5 mg-500 mg Tab, 1 Tablet(s), PO, BID PRN, 30 days, 2 refills, for a total of 60, start on January 15, 2010, end on 2010 and discontinued because: Refilled.     Crestor 10 mg Tab, 1 Tablet(s), PO, daily, 30 days, 11 refills, for a total of 30, start on January 15, 2010, end on 2010 and discontinued because: Refilled.     Vicodin 5 mg-500 mg Tab, 1 Tablet(s), PO, PRN, 30 days, 2 refills, for a total of 20, start on 2009, end on 2010, one po 4 x daily prn migraine and discontinued because: Refilled.     Ambien 10 mg Tab, 1 Tablet(s), PO, QHS, 21 days, for a total of 21, start on 2009, end on 2009 and NEIDA OMALLEY WILL CALL IN.     Levothroid 88 mcg Tab, 1 Tablet(s), PO, daily, 30 days, for a total of 30, start on 2009, end on 2010, NEED OFFICE VISIT & LABS  [facility] and discontinued     --------------------------------------------------------------------------------    Family History  Return To Top     Status Relationship Disease Comment Record Date     Alive Sister  Uterine cancer    2008         Father  Myocardial infarction  Age of death 72  2008         Paternal grandfather  Renal failure  Age of death 52  2008         Mother  Septicemia  Age of death 63  2008                 Home Meds:  Prescriptions Prior to Admission   Medication Sig Dispense Refill Last Dose     zolpidem (AMBIEN) 5 MG tablet TAKE ONE TABLET BY MOUTH ONCE DAILY AT BEDTIME AS NEEDED 15 tablet 2      HYDROcodone-acetaminophen (NORCO) 7.5-325 MG per tablet Take 1 tablet by mouth every 6 hours as needed for moderate to severe pain 30 tablet 0      levothyroxine (SYNTHROID, LEVOTHROID) 88 MCG tablet Take 1 tablet (88  mcg) by mouth daily 90 tablet 3      carvedilol (COREG) 12.5 MG tablet Take 1 tablet (12.5 mg) by mouth 2 times daily (with meals) 180 tablet 0      fenofibrate 145 MG tablet Take 1 tablet (145 mg) by mouth daily 90 tablet 3      rosuvastatin (CRESTOR) 10 MG tablet Take 1 tablet (10 mg) by mouth daily 90 tablet 0      clopidogrel (PLAVIX) 75 MG tablet Take 1 tablet (75 mg) by mouth daily 90 tablet 0      cinnamon (HM CINNAMON) 500 MG CAPS Take 2 capfuls by mouth 3 times daily 180 capsule 11      Alpha-Lipoic Acid 200 MG TABS Take 1 tablet by mouth 2 times daily 180 tablet 3      Cholecalciferol (VITAMIN D) 2000 UNITS CAPS Take 1 tablet by mouth daily 90 capsule 3      blood glucose (ZEE MICROLET 2) lancing device Use to test blood sugars TWICE DAILY  times daily or as directed. 300 each 0 Taking     ASPIRIN NOT PRESCRIBED, INTENTIONAL, 1 each daily Antiplatelet medication not prescribed intentionally due to Current thienopryridine therapy 0 each 0 Taking     SODIUM BICARBONATE PO Take 20 mg by mouth 3 times daily   Taking     blood glucose (ZEE CONTOUR) test strip Use to test blood sugars TWICE DAILY  times daily or as directed. 100 strip 11 Taking     blood glucose monitoring (ZEE CONTOUR MONITOR) meter device kit Use to test blood sugars TWICE DAILY  times daily or as directed.  MAY HAVE GENERIC CONTROL SOLUTIONS, STRIPS TWICE DAILY AND LANCETS   NEWLY DISCOVERED DIABETES 2 GOAL 8% 250.00  MAY HAVE GENERIC KIT AS WELL 1 kit 0 Taking     blood glucose calibration (ZEE CONTOUR) NORMAL solution Use to calibrate blood glucose monitor as directed. 1 Bottle 11 Taking     blood glucose monitoring (ONE TOUCH DELICA) lancets Use to test blood sugars TWICE DAILY  times daily or as directed.  GENERIC OK 1 Box 11 Taking     nitroglycerin (NITROSTAT) 0.4 MG SL tablet Place 1 tablet (0.4 mg) under the tongue every 5 minutes as needed for chest pain 25 tablet prn Taking     FISH OIL 3 capsules every morning.   Taking        Allergies:  Allergies   Allergen Reactions     Ceclor [Cefaclor Monohydrate]      Codeine      Demerol      Oxycodone Itching     Penicillins      Septra [Sulfamethoxazole W-Trimethoprim]      Somatropin      Could not walk or talk     Tramadol GI Disturbance     Ultram     Tricyclic Antidepressants      Hyper crazy     Valium        ROS: No fevers, chills, or night sweats. No recent weight changes.  No new visual or hearing complaints. No sore throat or nasal congestion. No SOB, cough, or wheezing.  No CP, palpitations, syncopal episodes, or dependent edema.  No new muscle or joint pain.  No weakness, numbness, or tingling of extremities. No new headaches. No changes in memory, mood, or affect.  No new rashes or bruises.     Physical Exam:  Temp:  [97  F (36.1  C)-97.3  F (36.3  C)] 97.3  F (36.3  C)  Heart Rate:  [51-69] 69  Resp:  [16-18] 18  BP: (120-140)/(57-68) 140/68  SpO2:  [98 %] 98 %  Gen: NAD, resting comfortably in bed, conversational  HEENT: normocephalic, atraumatic cranium, EOMI, sclerae anicteric. Oral mucosa pink and moist, no tonsillar edema or erythema, midline trachea, nonpalpable thyroid  Lungs: CTA in all fields, no wheezing or rhonchi  CV: RRR, S1S2 normal, no murmur. Radial pulses and DP pulses symmetric. No dependent edema.   Abd: Positive normal pitched bowel sounds, overall soft and non distended, nontender, no hepatosplenomegaly, no masses/guarding/rebound tenderness.   Musculoskeletal: grossly intact, strength 5/5 upper and lower extremities  Neuro: AOx3, CN II-VII grossly intact, sensation/motor intact in upper and lower extremities  Mental: normal mood and affect, regular rate of speech    Labs:  ABG No lab results found in last 7 days.  CBC  Recent Labs  Lab 09/14/17  0838 09/13/17  0912 09/11/17  0606 09/10/17  1945   WBC  --   --   --  9.6   HGB  --   --   --  14.5    241 192 245     BMP  Recent Labs  Lab 09/13/17  0912 09/12/17  0812 09/10/17  1945    142 142    POTASSIUM 4.4 4.6 4.5   CHLORIDE 108 113* 113*   CO2 23 22 23   BUN 21 20 21   CR 1.39* 1.29* 1.48*   GLC 66* 88 112*     LFT  Recent Labs  Lab 09/10/17  1946/17  1945   INR 1.2* 0.93     PancreasNo lab results found in last 7 days.    Imaging:  PARVEZ 9/12/2017:  Left Ventricle  Left ventricular size is normal. Left ventricular wall thickness is normal.  Global and regional left ventricular function is normal with an EF of 55-60%.     Right Ventricle  The right ventricle is normal size. Global right ventricular function is  normal.     Atria  The left atrial appendage is normal. It is free of spontaneous echo contrast  and thrombus. The right atria appears normal. Mild left atrial enlargement is  present. The atrial septum is intact as assessed by color Doppler and agitated  saline bubble study . A prominent eustachian valve is noted.     Mitral Valve  The mitral valve leaflet thickness is normal. There is a small mobile mass  attached to the A2 scallop of the mitral valve, consistent with a ruptured  minor chordae. There is mild-moderate mitral regurgitation (ERO 0.18 cm2, RVol  42 mL/beat).     Aortic Valve  The aortic valve is tricuspid. Mild aortic valve sclerosis is present. Trace  aortic insufficiency is present.     Tricuspid Valve  Mild calcification of the tricuspid valve chordae. Trace tricuspid  insufficiency is present.     Pulmonic Valve  The pulmonic valve is normal. Trace pulmonic insufficiency is present.     Vessels  The aortic root is normal in caliber (3.0 cm at the sinuses of Valsalva).  There is a small chunk of calcium at the sinotubular ridge. The proximal  ascending aorta is normal in caliber (3.0 cm). Complex atheroma of the aorta  present in the arch and descending thoracic aorta. Normal pulmonary arteries.  Normal pulmonary vein velocity.     Pericardium  No pericardial effusion is present.     Compared to Previous Study  No significant change from prior TTE study dated  9/11/17.    TTE 9/11/2017:  Left Ventricle  Global and regional left ventricular function is normal with an EF of 55-60%.  Left ventricular wall thickness is normal. Left ventricular size is normal.  Normal left ventricular filling for age. No regional wall motion abnormalities  are seen.     Right Ventricle  Right ventricular function, chamber size, wall motion, and thickness are  normal.     Atria  Both atria appear normal. The atrial septum is intact as assessed by color  Doppler and agitated dextrose bubble study .     Mitral Valve  Mild mitral annular calcification is present. Moderate mitral insufficiency is  present.      Aortic Valve  Mild aortic valve sclerosis is present. Mild aortic insufficiency is present.     Tricuspid Valve  The tricuspid valve is normal. Mild tricuspid insufficiency is present. The  right ventricular systolic pressure is approximated at 25.6 mmHg plus the  right atrial pressure.     Pulmonic Valve  The pulmonic valve is normal.     Vessels  The aorta root is normal. The pulmonary artery cannot be assessed. The  inferior vena cava cannot be assessed.     Pericardium  No pericardial effusion is present.    CT C/A/P 9/12/2017:  1. 10 mm lesion along the superior pole of the right kidney may  represent a hemorrhagic or proteinaceous cyst, solid mass or residual  from prior intervention. Recommend comparison with prior outside  imaging.  2. Right lumbar hernia containing fat.  3. Mild upper lobe predominant centrilobular emphysema.  4. Prominent mediastinal lymph nodes.      ASSESSMENT/PLAN: Patient is a 71 year old female with a history of HTN, DMII, HLD, previous strokes and a history of RCC s/p partial nephrectomy, CKD who presented on 9/10/2017 with an acute onset aphasia found to have anterior and middle left temporal lobe acute infarcts. Patient underwent echo which showed a flailed mitral valve A2 leaflet. This is consistent with ruptured minor cord. Patient currently admitted  under neurology service and CVTS was consulted for evaluation for possible repair of flail leaflet.  - Given mitral regurgitation is only mild to moderate and PARVEZ is consistent with a ruptured minor cord, there is no indication for repair or replacement. Not likely source of emboli with her stroke presentation.  - Recommend follow-up with cardiology for periodic monitoring of MR   - Other cares per primary team  - Thank you for the opportunity to participate in the care of this patient.    Patient and plan discussed with attending, Dr. Herson Allen.      Justin Reed PA-C  Cardiothoracic Surgery  September 14, 2017 2:03 PM   p: [phone]       Patient seen and examined. Investigations reviewed. I agree with the findings outlined in the advanced care provider's note. No surgical intervention at this point in time. Would recommend outpatient follow up with Cardiology. I spent a total of 30 minutes examining the patient, reviewing investigations and therapeutic counseling.

## 2017-09-14 NOTE — PLAN OF CARE
"Problem: Goal Outcome Summary  Goal: Goal Outcome Summary  SLP: Communication treatment cancelled.  Pt declined participation stating, \"I'm eating.\" Pt agreeable to f/u session.  ST to follow as indicated on POC.         "

## 2017-09-14 NOTE — PLAN OF CARE
Problem: Goal Outcome Summary  Goal: Goal Outcome Summary  OT/6A: Pt completed oral hygiene standing at sink with min VCs. Pt impulsive, swearing throughout session. Not wanting to participate     REC: 24 hour supervision due to cognitive and language deficits

## 2017-09-14 NOTE — PROGRESS NOTES
[facility], [place]   Neurology Daily Note  Sowmya Gibbs  [id number]  09/13/2017    Subjective: Patient is quite agitated this AM. Continues to feel frustrated with her aphasia.    Objective   /73 (BP Location: Left arm)  Pulse 66  Temp 98.2  F (36.8  C) (Oral)  Resp 16  Wt 55.7 kg (122 lb 12.7 oz)  LMP 01/25/2003  SpO2 98%  BMI 20.43 kg/m2  General: Adult, cooperative   HEENT: NC/AT, no icterus, pink and moist  Cardiac: RRR no M  Chest: CTAB no w/c/r  Abdomen: S/NT/ND  Extremities: No LE swelling.  Skin: No rash or lesion.   Psych: normal mood and affect  Neuro:  Mental status: Awake, alert, attentive, oriented to place. Speech is aphasic, able to follow simple commands. Difficulty with 2-step or more complex commands. Impaired repetition. No dysarthria.  Cranial nerves: CN2-12 tested and no significant findings appreciated. Eyes conjugate, PERRLA, EOMI, visual fields full, face symmetric, facial sensation intact, shoulder shrug strong, palate rise symmetric, tongue/uvula midline, hearing intact to conversation.   Motor: Tone normal. 5/5 strength in all 4 extremities. No atrophy or twitches. No pronator drift present.    Reflexes: Reflexic and symmetric biceps, brachioradialis, patellar.  Sensory: Intact to LT  x 4 extremities  Coordination: FNF no dysmetria    Investigations    Results for orders placed or performed during the hospital encounter of 09/10/17 (from the past 24 hour(s))   Psychiatry IP Consult: suicidal ideation; Consultant may enter orders: Yes; Patient to be seen: Routine; Call back #: [phone]    Amaury Guerrier MD     9/13/2017 11:39 AM  Patient seen and chart reviewed. Formal consult   dictated.(initial)    Amaury Damon MD   Platelet count   Result Value Ref Range    Platelet Count 241 150 - 450 10e9/L   Basic metabolic panel   Result Value Ref Range    Sodium 140 133 - 144 mmol/L    Potassium 4.4 3.4 - 5.3 mmol/L    Chloride  108 94 - 109 mmol/L    Carbon Dioxide 23 20 - 32 mmol/L    Anion Gap 9 3 - 14 mmol/L    Glucose 66 (L) 70 - 99 mg/dL    Urea Nitrogen 21 7 - 30 mg/dL    Creatinine 1.39 (H) 0.52 - 1.04 mg/dL    GFR Estimate 37 (L) >60 mL/min/1.7m2    GFR Estimate If Black 45 (L) >60 mL/min/1.7m2    Calcium 9.0 8.5 - 10.1 mg/dL       Assessment and Plan   Sowmya Gibbs is a 71 year old year old right handed female with multiple stroke risk factors including HTN, DMII, HLD, previous strokes and a history of RCC s/p partial nephrectomy, CKD who presented on 9/10/2017 with an acute onset aphasia found to have anterior and middle left temporal lobe acute infarcts.     #Anterior and middle left temporal lobe acute infarcts  Patient with a significant atheroma in the aortic arch and descending thoracic aorta. 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 Unclear as far as the source of this stroke. Will continue to assess whether patient needs anticoag or DAPT.   -Cardiology consult, appreciate recs.  -Continue ASA 81mg  -Continue plavix 75mg  -Continue atorvastatin 80mg    #Headaches  - Patient was on hydrocodone-acetaminophen 7.5-325mg PRN PTA, to continue    #HTN  - Continue carvidelol 12.5mg  - Labetalol 10mg PRN    #HLD  - Lipid profile: HDL 28 (L),  (H), LDL 88  - Continue Crestor 10mg     #DMII  - POC glucose range   - Continue consistent carbs and glucose checks    #Hypothyroidism  - Continue L-thyroxine 88mcg    #CKD  -Creatinine of 1.39  -Continue to monitor    FEN: Tolerating PO  PPx: Heparin 5,000 U  Code: FULL  Dispo: OT recs full supervision versus TCU.    Patient's condition was explained and discussed with patient and family (daughters).    Patient was seen and discussed with Dr. Dylan Paredes.    Kedar Crockett MD  Neurology resident, PGY-2  (P) [phone]

## 2017-09-14 NOTE — PLAN OF CARE
"Problem: Goal Outcome Summary  Goal: Goal Outcome Summary  Outcome: No Change  Slept throughout night. 1:1 attendant for suicide watch. 72 hour hold. No assessments made HS per MD pt. care order, \"No neuro or vital checks overnight or when patient is agitated\". No changes noted from previous shift.       "

## 2017-09-14 NOTE — PLAN OF CARE
Problem: Goal Outcome Summary  Goal: Goal Outcome Summary  D/I: patient remains on 6A following stroke ischemic middle left temporal lobe  Neuro- Expressive aphasia, generalized weakness. Pt frustrated with stroke and aphasia, will be generally uncooperative with cares, interventions and assessments.  CV- WNL  Pulm- On RA, sats in mid-high 90s. Did not let writer auscultate LS  GI- Tolerated regular diet, no BM today per pt.   - Voiding without difficulty.  Skin- MARIAN full skin, exposed areas have generalized bruising, intact otherwise  Gtts- No PIV access, ok'd by MD.  ID- n/a  Labs- WNL  Pain- Pt c/o HA relieved by norco, tylenol, and ibuprofen. Pt sleeps between cares but c/o HA when asked.  Activity- up with SBA, refuses GB  Drains-n/a  Social-daughters involved, Celina lives in [place] and is primary caregiver, Kristy is present at bedside.  CRRT-n/a  See flow sheets for further details and assessments.  A: 72 hr hold canceled, awaiting DC plan from MDs. Psyche re-consulted for verification of patient's abilities to make own decision. Implantable cardiac monitor to be placed tomorrow per stroke team.  P: continue to monitor, notify MD of significant changes.

## 2017-09-14 NOTE — PROGRESS NOTES
[facility], [place]   Neurology Daily Note  Sowmya Gibbs  [id number]  09/14/2017    Subjective:  Patient still expresses frustration with situation and wants to leave. Condition explained to the patient with plan to insert implantable cardiac monitor at discharge. Patient seems open to plan.    Objective:   /57 (BP Location: Right arm)  Pulse 66  Temp 97  F (36.1  C) (Oral)  Resp 16  Wt 55.7 kg (122 lb 12.7 oz)  LMP 01/25/2003  SpO2 98%  BMI 20.43 kg/m2  General: Awake and alert, not in any acute distress, cooperative  HEENT: Atraumatic, normocephalis, no scleral icterus or conjunctival pallor   Cardiac: RRR, S1, S2 no S3/S4, no murmurs  Chest: Clear to auscultation bilaterally, no wheezes, rubs or crepitations  Abdomen: Soft, non-tender, non-distended  Extremities: No LE swelling.  Skin: No rash or lesion.   Psych: Normal mood and affect  Neuro:  Mental status: Awake, alert, attentive, oriented to p/p/t. Speech remains aphasic, able to follow simple commands. Difficulty with more complex commands.  Cranial nerves: CN2-12 tested and no significant findings appreciated. Eyes conjugate, PERRLA, EOMI, visual fields full, face symmetric, facial sensation intact, shoulder shrug strong, palate rise symmetric, tongue/uvula midline, hearing intact to conversation.  Motor: Tone normal. 5/5 strength in all 4 extremities. No atrophy or twitches. No pronator drift.  Reflexes: 2+ reflexic and symmetric biceps, brachioradialis, patellar, and achilles. No crossed adductors or spread. Toes down-going.  Sensory: Intact to LT  x 4 extremities  Coordination: FNF intact bilaterally, no dysmetria.      Lab Investigations:   Hemoglobin A1C   Date Value Ref Range Status   09/11/2017 5.6 4.3 - 6.0 % Final   09/04/2015 5.3 4.3 - 6.0 % Final   03/20/2015 5.7 4.3 - 6.0 % Final   08/06/2014 6.2 (H) 4.3 - 6.0 % Final   11/06/2007 5.4 4.3 - 6.0 % Final     Lab Results   Component Value Date    LDL  88 09/11/2017     Assessment and Plan   Sowmya Gibbs is a 71 year old year old right handed female with multiple stroke risk factors including HTN, DMII, HLD, previous strokes and a history of RCC s/p laparoscopic partial nephrectomy in 204, CKD who presented on 9/10/2017 with an acute onset aphasia found to have anterior and middle left temporal lobe acute infarcts.      #anterior and middle left temporal lobe acute infarcts  Patient with a significant atheroma in the aortic arch and descending thoracic aorta. 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 Patient was reviewed by [facility]VS with impression that mobile mass is not likely a source of emboli with her stroke presentation.   - Patient will need dual antiplatelet course of 3 months at discharge with implantable cardiac event monitor placed. (contacted EP nurse, scheduled for tomorrow)  - Continue ASA 81mg  - Continue plavix 75mg  - Continue atorvastatin 80mg     #small mobile mass attached to mitral valve  - As per cards, Given mitral regurgitation is only mild to moderate and PARVEZ is consistent with a ruptured minor cord, there is no indication for repair or replacement. Not likely source of emboli with her stroke presentation.  - Recommend follow-up with cardiology for periodic monitoring of MR      #prominent mediastinal lymph notes  - Discussed with radiology on 9/13, likely reactive as patient also has centrilobular emphysema  - For follow up CT as outpatient within 3-6 months     #10mm lesion along the superior pole of the right kidney  - Likely residual from prior intervention after discussion with radiology  - For follow up CT as outpatient within 3-6 months    #headaches  - Still complaining of persistent headaches  - Add scheduled acetaminophen  - Add magnesium supplements  - Ibuprofen PRN    Chronic issues:  #HTN  - Continue carvidelol 12.5mg  - Labetalol 10mg PRN     #HLD  - Lipid profile: HDL 28  (L),  (H), LDL 88  - Atorvastatin 80     #DMII  - Continue consistent carbs and glucose checks     #Hypothyroidism  - Continue L-thyroxine 88mcg     FEN: Tolerating PO  PPx: Heparin 5,000 U  Code: FULL  Dispo: OT recs full supervision versus TCU. Awaiting TCU placement.      Patient was seen and discussed with Dr. Dylan Paredes.    JAYLEN [facility] SHARONA, MSc(Res)  Neurology Resident, PGY-1  Pager: [phone]

## 2017-09-14 NOTE — PROGRESS NOTES
SPIRITUAL HEALTH SERVICES Progress Note  [facility] ([place]) 6A  Initial visit with pt per pt's request of hospital  at the admission. Pt was excited to meet with a . This is something she was looking for a while. Pt talked about her life and lashanda. For some reasons, pt had difficulty to articulate or pronounce words. After our conversation we shared a prayer and gave pt the Holy Communion. Pt enjoyed the visit and would like a f/u visit tomorrow (Friday). I will see her again tomorrow if she stays in this hospital.                                                                                                                                         Father Torey Cifuentes

## 2017-09-14 NOTE — CONSULTS
DATE OF SERVICE:  09/14/2017      HISTORY OF PRESENT ILLNESS:  I was asked to see Sowmya Gibbs by the primary team to assess her capacity for her request to discharge from the hospital as she has been requesting to sign out.      Assessing capacity for this patient needs to remain narrow in scope.  Her express aphasia makes it somewhat difficult to assess any level of cognitive impairment.  This was also noted on the Occupational Therapy note from yesterday.  Because of that, I kept the scope of this capacity evaluation to her request for discharge.     1.  Regarding the patient's ability to communicate a choice:  The patient was very clear in her desire for discharge.  She indicated frustration with being in the hospital.  She feels like she is being held here.  She does not feel that the team cares for her.  She feels that she would be better off on her own at home with her friend.     2.  Regarding her ability to understand what she is requesting:  At this point in time her ability to understand falls more into question.  She does understand that a request for discharge means to leave without any additional services; however, I do not believe that she really understands why she remains in the hospital.  Several times she requested I explain what her medications are.  Several times she asked what she is still doing here, and why she is being held here.  She feels that the team is not doing anything for her, so there is no purpose for her being in the hospital.  Given that, I do not feel that she has a firm understanding of reason for hospitalization which means that she does not have understanding for a request for discharge.   3.  Regarding her ability to appreciate her situation and consequences of refusing care and leaving the hospital:  The patient does seem aware that she has had multiple strokes, and that she is at risk for another.  When I tried to discuss this with her she would become angry and  indicate that I cannot predict the future and I don't know what is going to happen. While that is true, I do not feel that she fully understands how at risk if she is.  I believe she seems to think that there is low likelihood that she will have another event.  Several times I tried to get her to talk about potential consequences from having another stroke at home without additional cares and was met with anger as well as denial about her having a future stroke.  Because of this, I do not feel that she fully understands these consequences.   4.  Regarding her ability to make a rational choice by applying current available information:  I do not feel that she possesses this.  She is very emotionally charged with anger toward the team.  I believe that she is frustrated with her recurrent strokes and I feel that she is more interested in leaving the hospital because of her undesirable experience here than she is with obtaining the proper care.     Based on the above, I feel that the patient does not have the capacity to request her discharge at this time.  In that situation this decision would likely fall to her primary decision maker.  As with all capacity evaluations, especially when there is an acute neurological insult leading to possible cognitive impairment and communication impairment, her capacity to make this decision is not known to be permanent and needs to be reassessed on a regular basis.      RECOMMENDATIONS FOR THE PRIMARY TEAM:  In addition to the capacity situation, I feel that the patient was extremely frustrated with treatment here due to some confusion.  Some of this may simply be her perception of events or general frusration with the situation. However, she does not seem to understand the purpose of being in the hospital.  She does not understand the role of the various members of her team.  She seems extremely overwhelmed with the sheer number of different faces that have come into her room and  "as such, she feels that no one individual person is really in charge of her care and no one individual person really cares about her.  It may be beneficial for the primary team to have one person sit down with her and discuss her treatment and also assume a primary \"point person\" role for her hospital stay.  I believe this intervention may ease her desire to leave and make her more willing to consider the treatment team's recommendations.    Thank you for the consult. Please contact the psychiatry consult service with further questions.        MARYAM STONE MD             D: 2017 10:29   T: 2017 11:51   MT: PETER      Name:     JOLLY EDWARDS   MRN:      [id number]        Account:       [id number]   :      1945           Consult Date:  2017      Document: [id number]      "

## 2017-09-15 ENCOUNTER — APPOINTMENT (OUTPATIENT)
Dept: CARDIOLOGY | Facility: CLINIC | Age: 72
DRG: 040 | End: 2017-09-15
Attending: NURSE PRACTITIONER
Payer: MEDICARE

## 2017-09-15 ENCOUNTER — APPOINTMENT (OUTPATIENT)
Dept: SPEECH THERAPY | Facility: CLINIC | Age: 72
DRG: 040 | End: 2017-09-15
Payer: MEDICARE

## 2017-09-15 LAB — INTERPRETATION ECG - MUSE: NORMAL

## 2017-09-15 PROCEDURE — 25000125 ZZHC RX 250: Performed by: NURSE PRACTITIONER

## 2017-09-15 PROCEDURE — 12000008 ZZH R&B INTERMEDIATE UMMC

## 2017-09-15 PROCEDURE — 0JH602Z INSERTION OF MONITORING DEVICE INTO CHEST SUBCUTANEOUS TISSUE AND FASCIA, OPEN APPROACH: ICD-10-PCS | Performed by: INTERNAL MEDICINE

## 2017-09-15 PROCEDURE — 25000132 ZZH RX MED GY IP 250 OP 250 PS 637: Mod: GY | Performed by: STUDENT IN AN ORGANIZED HEALTH CARE EDUCATION/TRAINING PROGRAM

## 2017-09-15 PROCEDURE — 25000128 H RX IP 250 OP 636: Performed by: PSYCHIATRY & NEUROLOGY

## 2017-09-15 PROCEDURE — 93010 ELECTROCARDIOGRAM REPORT: CPT | Mod: 59 | Performed by: INTERNAL MEDICINE

## 2017-09-15 PROCEDURE — 33282 ZZHC LOOP RECORDER IMPLANT: CPT | Performed by: INTERNAL MEDICINE

## 2017-09-15 PROCEDURE — 25000132 ZZH RX MED GY IP 250 OP 250 PS 637: Mod: GY | Performed by: PSYCHIATRY & NEUROLOGY

## 2017-09-15 PROCEDURE — 27210957 ZZH ACCESS EP PROC CR5

## 2017-09-15 PROCEDURE — 93005 ELECTROCARDIOGRAM TRACING: CPT

## 2017-09-15 PROCEDURE — 40000275 ZZH STATISTIC RCP TIME EA 10 MIN

## 2017-09-15 PROCEDURE — 92507 TX SP LANG VOICE COMM INDIV: CPT | Mod: GN

## 2017-09-15 PROCEDURE — A9270 NON-COVERED ITEM OR SERVICE: HCPCS | Mod: GY | Performed by: STUDENT IN AN ORGANIZED HEALTH CARE EDUCATION/TRAINING PROGRAM

## 2017-09-15 PROCEDURE — 40000225 ZZH STATISTIC SLP WARD VISIT

## 2017-09-15 PROCEDURE — A9270 NON-COVERED ITEM OR SERVICE: HCPCS | Mod: GY | Performed by: PSYCHIATRY & NEUROLOGY

## 2017-09-15 PROCEDURE — C1764 EVENT RECORDER, CARDIAC: HCPCS

## 2017-09-15 PROCEDURE — 40000141 ZZH STATISTIC PERIPHERAL IV START W/O US GUIDANCE

## 2017-09-15 PROCEDURE — 33282 ZZHC LOOP RECORDER IMPLANT: CPT

## 2017-09-15 PROCEDURE — S0077 INJECTION, CLINDAMYCIN PHOSP: HCPCS | Performed by: NURSE PRACTITIONER

## 2017-09-15 RX ORDER — LIDOCAINE 40 MG/G
CREAM TOPICAL
Status: CANCELLED | OUTPATIENT
Start: 2017-09-15

## 2017-09-15 RX ORDER — CLINDAMYCIN PHOSPHATE 900 MG/50ML
900 INJECTION, SOLUTION INTRAVENOUS
Status: COMPLETED | OUTPATIENT
Start: 2017-09-15 | End: 2017-09-15

## 2017-09-15 RX ORDER — HYDROCODONE BITARTRATE AND ACETAMINOPHEN 7.5; 325 MG/1; MG/1
1-2 TABLET ORAL EVERY 6 HOURS PRN
Status: DISCONTINUED | OUTPATIENT
Start: 2017-09-15 | End: 2017-09-21 | Stop reason: HOSPADM

## 2017-09-15 RX ORDER — SODIUM CHLORIDE 9 MG/ML
INJECTION, SOLUTION INTRAVENOUS CONTINUOUS
Status: DISCONTINUED | OUTPATIENT
Start: 2017-09-15 | End: 2017-09-15

## 2017-09-15 RX ORDER — LIDOCAINE 40 MG/G
CREAM TOPICAL
Status: DISCONTINUED | OUTPATIENT
Start: 2017-09-15 | End: 2017-09-15

## 2017-09-15 RX ADMIN — CARVEDILOL 12.5 MG: 3.12 TABLET, FILM COATED ORAL at 07:35

## 2017-09-15 RX ADMIN — ACETAMINOPHEN 650 MG: 325 TABLET ORAL at 00:34

## 2017-09-15 RX ADMIN — ATORVASTATIN CALCIUM 80 MG: 40 TABLET, FILM COATED ORAL at 07:35

## 2017-09-15 RX ADMIN — ACETAMINOPHEN 650 MG: 325 TABLET ORAL at 23:58

## 2017-09-15 RX ADMIN — HYDROCODONE BITARTRATE AND ACETAMINOPHEN 2 TABLET: 7.5; 325 TABLET ORAL at 08:17

## 2017-09-15 RX ADMIN — HYDROCODONE BITARTRATE AND ACETAMINOPHEN 2 TABLET: 7.5; 325 TABLET ORAL at 13:37

## 2017-09-15 RX ADMIN — CLINDAMYCIN PHOSPHATE 900 MG: 18 INJECTION, SOLUTION INTRAVENOUS at 09:59

## 2017-09-15 RX ADMIN — HYDROCODONE BITARTRATE AND ACETAMINOPHEN 2 TABLET: 7.5; 325 TABLET ORAL at 19:54

## 2017-09-15 RX ADMIN — IBUPROFEN 400 MG: 200 TABLET, FILM COATED ORAL at 11:58

## 2017-09-15 RX ADMIN — CARVEDILOL 12.5 MG: 3.12 TABLET, FILM COATED ORAL at 17:37

## 2017-09-15 RX ADMIN — LEVOTHYROXINE SODIUM 88 MCG: 88 TABLET ORAL at 07:34

## 2017-09-15 RX ADMIN — HEPARIN SODIUM 5000 UNITS: 5000 INJECTION, SOLUTION INTRAVENOUS; SUBCUTANEOUS at 19:54

## 2017-09-15 RX ADMIN — HYDROCODONE BITARTRATE AND ACETAMINOPHEN 2 TABLET: 7.5; 325 TABLET ORAL at 01:44

## 2017-09-15 RX ADMIN — ACETAMINOPHEN 650 MG: 325 TABLET ORAL at 07:34

## 2017-09-15 RX ADMIN — Medication 500 MG: at 00:34

## 2017-09-15 RX ADMIN — ASPIRIN 81 MG: 81 TABLET, COATED ORAL at 08:00

## 2017-09-15 RX ADMIN — Medication 500 MG: at 08:00

## 2017-09-15 RX ADMIN — CLOPIDOGREL 75 MG: 75 TABLET, FILM COATED ORAL at 08:00

## 2017-09-15 ASSESSMENT — VISUAL ACUITY
OU: NORMAL ACUITY
OU: NORMAL ACUITY

## 2017-09-15 ASSESSMENT — PAIN DESCRIPTION - DESCRIPTORS: DESCRIPTORS: HEADACHE

## 2017-09-15 NOTE — PROGRESS NOTES
[facility], [place]   Neurology Daily Note  Sowmya Gibbs  [id number]  09/15/2017    Subjective:  Patient continues to be frustrated with impaired speech and situation. Plan was explained to her regarding placement of a loop recorder today.    Objective:   /62  Pulse 54  Temp 96.8  F (36  C) (Oral)  Resp 16  Wt 55.7 kg (122 lb 12.7 oz)  LMP 01/25/2003  SpO2 96%  BMI 20.43 kg/m2  General: Awake and alert, not in any acute distress  HEENT: Atraumatic, normocephalis, no scleral icterus or conjunctival pallor   Cardiac: RRR, S1, S2 no S3/S4, no murmurs  Chest: Clear to auscultation bilaterally, no wheezes, rubs or crepitations  Abdomen: Soft, non-tender, non-distended  Extremities: No LE swelling.  Skin: No rash or lesion.   Neuro:  Mental status: Awake, alert, attentive. Speech remains aphasic, able to follow simple commands. Difficulty with more complex commands.  Cranial nerves: Eyes conjugate, PERRLA, EOMI, visual fields full, face symmetric, facial sensation intact, shoulder shrug strong, palate rise symmetric, tongue/uvula midline, hearing intact to conversation.  Motor: Tone normal. 5/5 strength in all 4 extremities. No atrophy or twitches. No pronator drift.  Reflexes: 2+ reflexic and symmetric biceps, brachioradialis, patellar, and achilles. No crossed adductors or spread. Toes down-going.  Sensory: Intact to LT  x 4 extremities  Coordination: FNF intact bilaterally, no dysmetria.    Lab Investigations:   Hemoglobin A1C   Date Value Ref Range Status   09/11/2017 5.6 4.3 - 6.0 % Final   09/04/2015 5.3 4.3 - 6.0 % Final   03/20/2015 5.7 4.3 - 6.0 % Final   08/06/2014 6.2 (H) 4.3 - 6.0 % Final   11/06/2007 5.4 4.3 - 6.0 % Final     Lab Results   Component Value Date    LDL 88 09/11/2017       Assessment and Plan   Sowmya Gibbs is a 71 year old year old right handed female with multiple stroke risk factors including HTN, DMII, HLD, previous strokes and a history of  RCC s/p laparoscopic partial nephrectomy in 204, CKD who presented on 9/10/2017 with an acute onset aphasia found to have anterior and middle left temporal lobe acute infarcts.       #anterior and middle left temporal lobe acute infarcts  - Patient with a significant atheroma in the aortic arch and descending thoracic aorta.  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  - Had implantable loop monitor placed today.  - Patient will need dual antiplatelet course of 3 months at discharge.  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 Not likely source of emboli with her stroke presentation.  - Recommend follow-up with cardiology for periodic monitoring of MR       #prominent mediastinal lymph notes  - Discussed with radiology on 9/13, likely reactive as patient also has centrilobular emphysema  - For follow up CT as outpatient within 3-6 months      #10mm lesion along the superior pole of the right kidney  - Likely residual from prior intervention after discussion with radiology  - For follow up CT as outpatient within 3-6 months     #headaches  - Still complaining of persistent headaches  - Add scheduled acetaminophen  - Add magnesium supplements  - Ibuprofen PRN     Chronic issues:  #HTN  - Continue carvidelol 12.5mg  - Labetalol 10mg PRN      #HLD  - Lipid profile: HDL 28 (L),  (H), LDL 88  - Atorvastatin 80      #DMII  - Continue consistent carbs and glucose checks      #Hypothyroidism  - Continue L-thyroxine 88mcg      FEN: Tolerating PO  PPx: Heparin 5,000 U  Code: FULL  Dispo: OT recs full supervision versus TCU, unfortunately difficult for TCU placement as patient is on [place]  involved.      Patient was seen and discussed with Dr. Dylan Paredes.     JAYLEN Cervantes [facility] SHARONA, MSc(Res)  Neurology Resident, PGY-1  Pager: [phone]

## 2017-09-15 NOTE — PLAN OF CARE
Problem: Goal Outcome Summary  Goal: Goal Outcome Summary  OT6A: Cx- Pt out of room for procedure, will re-attempt this pm as able.     Addendum: Pt working with speech therapy on second attempt. Will reschedule for 9/16/17

## 2017-09-15 NOTE — PLAN OF CARE
Problem: Goal Outcome Summary  Goal: Goal Outcome Summary  Outcome: No Change  RN [id number]  VSS except alex at times.  Neuros intact except continues with mod-severe aphasia and cognitive deficits.  Endorses mild head pain this shift, would like to have Norco when due.  Voids spont, not measuring.  Regular diet, fair PO this evening, poor PO all day.  Calorie counts to start at [place].  PIV painful with flush, d/c'd, pt does not want replaced.  On list for MDs for ok to leave IV out.  [facility]  Pt had loop recorder placed this morning, primapore to Left chest, c/d/i.  Awaiting TCU placement.  Continue with POC.

## 2017-09-15 NOTE — PROGRESS NOTES
CLINICAL NUTRITION SERVICES - BRIEF NOTE    Nutrition Prescription    RECOMMENDATIONS FOR MDs/PROVIDERS TO ORDER:  - none currenlty     Recommendations already ordered by Registered Dietitian (RD):  - now that diet re-advanced, ordered calorie counts   - ordered oral nutrition supplements TID: Ensure between meals     Future/Additional Recommendations:  - PO/supp adequacy   - calorie counts ordered 9/15     Nutrition Progress Note - f/u for progress towards previous nutrition POC (see previous 9/12 reassessment for details)     Renea Wu RD, LD  Neuro ICU  Pager: [phone]

## 2017-09-15 NOTE — PLAN OF CARE
Problem: Goal Outcome Summary  Goal: Goal Outcome Summary  Outcome: No Change  Pt admitted w ischemic stroke. Still w moderate to severe aphasia. VSS. No focal deficits, no n/t. Follows commands though can be noncompliant w cares and assessments d/t frustration. Norco for pain. Up ind. BG checks. Had loop recorder placed this AM, L chest wall; w primipore. SL'ed. Tolerating diet. Jez cts to start tonight. Voiding. Does not have capacity. Has door alarm.Will be a difficult placement issue. C/t monitor.

## 2017-09-15 NOTE — PLAN OF CARE
"Problem: Goal Outcome Summary  Goal: Goal Outcome Summary     SLP 6A:. Pt seen for aphasia tx. Continues to be significantly frustrated and easily shuts down during communication breakdowns. Pt with improved success reading at 1-word level in field of 2. Present binary questions/choices \"do you want ____ or _____?\" to reduce communication demands. Encourage supplementing attempts to verbalize with written communication on white board (i.e, cue her to draw what she is talking about, or write a word associated with it). Also encourage gestures and increased time for responses. Recommend 24 hr support given significance of language impairment affecting safety with OP services. At this time, pt would not be safe to discharge home without this support.       "

## 2017-09-15 NOTE — PLAN OF CARE
Problem: Goal Outcome Summary  Goal: Goal Outcome Summary  Outcome: Improving  VSS. Neuros intact ex. aphasia. Up independently in room with door alarm. Frustrated by cares and assessments but is cooperating with stroke assessment. Implantable cardiac monitor is scheduled before DC. Regular diet. Voids spontaneously and without difficulty. Slept most of night. Pain controled with NORCO q6h. No IV access but will need one for the cardiac monitor implant. Sitter D/C last shift with no elopement attempts.

## 2017-09-15 NOTE — DISCHARGE INSTRUCTIONS
Home Care after a Loop Recorder Implant  Wound care:  Check for signs of infection each day.  These include increased redness, swelling, drainage or a fever over 101 F (38.3 C).  Call us immediately if you see any of these signs.  You may shower 24 hours after the procedure.  Do not submerge the incision (in a bath tub, hot tub, or swimming pool) until fully healed.  Do not apply powder or lotion to the incision for 14 days.     Pain:   You may have mild pain for 3 to 5 days.  Take acetaminophen (Tylenol) or ibuprofen (Advil) for the pain.  Call us if the pain is severe or lasts more than 5 days.    Activity:  After 24 hours, slowly return to your normal activities.      Avoid anything that may cause rough contact or a hard hit to your chest.  This includes football, hockey, and other contact sports.    Follow Up Visits:  Return to the clinic in 7 to 10 days to have your loop recorder and wound checked.      Telling others about your device:  Before you have any medical tests or treatments, tell the doctors, dentists, and other care providers about your device.  There are a few tests and treatments that may interfere with your device.  Your care team may need to take special steps to keep you safe.  Before you leave the hospital, you will receive a temporary ID card.  A permanent card will be mailed to you about 6 to 8 weeks later.  Always carry the ID card with you.  It has important details about your device.  Safety near electrical equipment:  All of these are safe to use when in good repair -    Microwaves    Radios    Cordless phone    Remote controls    Small electrical tools  Security sherman: It is okay to walk through security sherman at the airports and department stores. Tell airport security that you have an implanted loop recorder.  Full-body scanners are safe.  Activator: Black and Gray (pictured below). Carry this with you. Press the button if you have symptoms.            Home Monitor: White and Blue  (pictured below). Plug in at home. Use under device clinic direction.    Questions?  Please call [facility].    General inquiry: [phone]    Device Nurse:          Business Hours:  [phone]                         After Hours:  [phone]   Choose option 4, then ask for the                             on-call device nurse at job code [id number].    Your next device clinic appointment is scheduled on:     ___________________________ at _____________.          [facility] - Clinic [address]  [address]

## 2017-09-15 NOTE — OP NOTE
"EP PROCEDURE NOTE    Procedures:  1. Implantable loop recorder implantation.    Attending: Jonh MACKENZIE Fellow: none  Procedure Date: 9/15/2017    Pre-operative Diagnosis:  Cryptogenic stroke.  Post-operative diagnosis:   Successful implantation of ILR.  Complications: None.     Fluoroscopy time/dose: none    Clinical Profile:  Sowmya Gibbs is a 71 year woman admitted with a cryptogenic stroke.  We have been asked to Neurology to place an implantable loop recorder.  She has expressive aphasia, but denied any prior knowledge of the term \"atrial fibrillation\" and denied any past history of palpitations.    PROCEDURE  The risks and benefits of the procedure were explained to the patient in full.  The risks of the procedure include, but are not limited to: pain, bleeding, blood transfusion reactions and infection. Informed Consent was obtained. The patient was brought to the EP lab in a fasting and hemodynamically stable condition. The patient was prepped and draped in a sterile fashion.   This procedure was performed without sedation.   The chest wall was anesthetized with 2% Lidocaine.  An incision was made around the left parasternal area at the 4th intercostal space 1-2 cm lateral to the the sternal border. The implantable loop recorder was then injected with an introducer into the subcutaneous tissue. The device was then interrogated to ensure adequate sensing.    The pocket was then closed using 4-0 Vicryl for the subcuticular layer and Dermabond.  A Steri-Strips and a dressing were then applied over the incision site..     Dr. Borja was present throughout the entire procedure.     EQUIPMENT  1. The ILR is a Scaleogy model LNQ11, Serial [id number].     PROGRAMMING  1. Tachy cut off rate = 158 bpm, 16 beats  2. Rafael cut off rate = 30 bpm, 4 beats   3. AF (only) detection is ON  4. Asystole Duration = 3.0 sec.     PLAN:  1. Wound care.  2. Antibiotics.    "

## 2017-09-16 ENCOUNTER — APPOINTMENT (OUTPATIENT)
Dept: SPEECH THERAPY | Facility: CLINIC | Age: 72
DRG: 040 | End: 2017-09-16
Payer: MEDICARE

## 2017-09-16 LAB
ANION GAP SERPL CALCULATED.3IONS-SCNC: 5 MMOL/L (ref 3–14)
BUN SERPL-MCNC: 35 MG/DL (ref 7–30)
CALCIUM SERPL-MCNC: 9.2 MG/DL (ref 8.5–10.1)
CHLORIDE SERPL-SCNC: 108 MMOL/L (ref 94–109)
CO2 SERPL-SCNC: 26 MMOL/L (ref 20–32)
CREAT SERPL-MCNC: 1.63 MG/DL (ref 0.52–1.04)
GFR SERPL CREATININE-BSD FRML MDRD: 31 ML/MIN/1.7M2
GLUCOSE SERPL-MCNC: 85 MG/DL (ref 70–99)
POTASSIUM SERPL-SCNC: 4.8 MMOL/L (ref 3.4–5.3)
SODIUM SERPL-SCNC: 139 MMOL/L (ref 133–144)

## 2017-09-16 PROCEDURE — 92507 TX SP LANG VOICE COMM INDIV: CPT | Mod: GN | Performed by: SPEECH-LANGUAGE PATHOLOGIST

## 2017-09-16 PROCEDURE — 36415 COLL VENOUS BLD VENIPUNCTURE: CPT | Performed by: STUDENT IN AN ORGANIZED HEALTH CARE EDUCATION/TRAINING PROGRAM

## 2017-09-16 PROCEDURE — 92526 ORAL FUNCTION THERAPY: CPT | Mod: GN | Performed by: SPEECH-LANGUAGE PATHOLOGIST

## 2017-09-16 PROCEDURE — 25000132 ZZH RX MED GY IP 250 OP 250 PS 637: Mod: GY | Performed by: PSYCHIATRY & NEUROLOGY

## 2017-09-16 PROCEDURE — 40000225 ZZH STATISTIC SLP WARD VISIT: Performed by: SPEECH-LANGUAGE PATHOLOGIST

## 2017-09-16 PROCEDURE — 12000008 ZZH R&B INTERMEDIATE UMMC

## 2017-09-16 PROCEDURE — 25000128 H RX IP 250 OP 636: Performed by: PSYCHIATRY & NEUROLOGY

## 2017-09-16 PROCEDURE — A9270 NON-COVERED ITEM OR SERVICE: HCPCS | Mod: GY | Performed by: STUDENT IN AN ORGANIZED HEALTH CARE EDUCATION/TRAINING PROGRAM

## 2017-09-16 PROCEDURE — 25000132 ZZH RX MED GY IP 250 OP 250 PS 637: Mod: GY | Performed by: STUDENT IN AN ORGANIZED HEALTH CARE EDUCATION/TRAINING PROGRAM

## 2017-09-16 PROCEDURE — 80048 BASIC METABOLIC PNL TOTAL CA: CPT | Performed by: STUDENT IN AN ORGANIZED HEALTH CARE EDUCATION/TRAINING PROGRAM

## 2017-09-16 PROCEDURE — A9270 NON-COVERED ITEM OR SERVICE: HCPCS | Mod: GY | Performed by: PSYCHIATRY & NEUROLOGY

## 2017-09-16 RX ORDER — IBUPROFEN 600 MG/1
600 TABLET, FILM COATED ORAL EVERY 6 HOURS PRN
Status: DISCONTINUED | OUTPATIENT
Start: 2017-09-16 | End: 2017-09-21 | Stop reason: HOSPADM

## 2017-09-16 RX ADMIN — CLOPIDOGREL 75 MG: 75 TABLET, FILM COATED ORAL at 08:15

## 2017-09-16 RX ADMIN — ATORVASTATIN CALCIUM 80 MG: 40 TABLET, FILM COATED ORAL at 08:15

## 2017-09-16 RX ADMIN — IBUPROFEN 600 MG: 600 TABLET ORAL at 12:49

## 2017-09-16 RX ADMIN — HYDROCODONE BITARTRATE AND ACETAMINOPHEN 2 TABLET: 7.5; 325 TABLET ORAL at 21:20

## 2017-09-16 RX ADMIN — LEVOTHYROXINE SODIUM 88 MCG: 88 TABLET ORAL at 08:14

## 2017-09-16 RX ADMIN — Medication 500 MG: at 08:14

## 2017-09-16 RX ADMIN — HYDROCODONE BITARTRATE AND ACETAMINOPHEN 2 TABLET: 7.5; 325 TABLET ORAL at 08:15

## 2017-09-16 RX ADMIN — HYDROCODONE BITARTRATE AND ACETAMINOPHEN 2 TABLET: 7.5; 325 TABLET ORAL at 14:47

## 2017-09-16 RX ADMIN — LORAZEPAM 1 MG: 1 TABLET ORAL at 08:14

## 2017-09-16 RX ADMIN — CARVEDILOL 12.5 MG: 3.12 TABLET, FILM COATED ORAL at 18:24

## 2017-09-16 RX ADMIN — IBUPROFEN 600 MG: 600 TABLET ORAL at 18:26

## 2017-09-16 RX ADMIN — HYDROCODONE BITARTRATE AND ACETAMINOPHEN 2 TABLET: 7.5; 325 TABLET ORAL at 02:20

## 2017-09-16 RX ADMIN — ASPIRIN 81 MG: 81 TABLET, COATED ORAL at 08:14

## 2017-09-16 RX ADMIN — HEPARIN SODIUM 5000 UNITS: 5000 INJECTION, SOLUTION INTRAVENOUS; SUBCUTANEOUS at 08:14

## 2017-09-16 RX ADMIN — ACETAMINOPHEN 650 MG: 325 TABLET ORAL at 18:24

## 2017-09-16 ASSESSMENT — VISUAL ACUITY
OU: GLASSES;BASELINE
OU: NORMAL ACUITY
OU: GLASSES;BASELINE
OU: NORMAL ACUITY

## 2017-09-16 ASSESSMENT — PAIN DESCRIPTION - DESCRIPTORS
DESCRIPTORS: HEADACHE
DESCRIPTORS: HEADACHE
DESCRIPTORS: ACHING
DESCRIPTORS: HEADACHE

## 2017-09-16 NOTE — PLAN OF CARE
Problem: Goal Outcome Summary  Goal: Goal Outcome Summary  Outcome: No Change  Pt admitted for ischemic stroke workup on 9/10. VSS, bradycardic at baseline. Neurologically intact except for expressive aphasia and disorientation to time; difficult to assess d/t aphasia. Gets frustrated easily, needs encouragement to stay calm and take her time finding words. Awaiting TCU placement. Pt expressed significant frustration with lack of communication about d/c plan. Would like to d/c to boyfriend s home and expressed that she  is not stupid  and won t be tricked into going to TCU. No PIV; MD aware. Tolerating regular diet, on allison counts; needs encouragement to eat. Independent with cares, door alarm on. Voiding spontaneously without difficulties. Loop recorder placed on L side 9/15, primapore in place. Chronic migraine pain managed with Tylenol and Norco. Will continue to monitor and follow plan of care.

## 2017-09-16 NOTE — PROGRESS NOTES
"Social Work: Assessment with Discharge Plan    Patient Name:  Sowmya Gibbs  :  1945  Age:  71 year old  MRN:  [id number]  Risk/Complexity Score:  Filed Complexity Screen Score: 12  Completed assessment with:  Bedside with patient and via telephone with eldest daughterCelina.     Presenting Information   Reason for Referral:  Stroke consult  Date of Intake:  2017  Referral Source:  Nurse  Decision Maker: Patient was deemed non decisional by psychiatry. Patient's eldest daughterCelina has been her decision maker at this time.   Alternate Decision Maker: Patient's second eldest daughterKristy.   Health Care Directive: Unknown  Living Situation:  House  Previous Functional Status:  Independent  Patient and family understanding of hospitalization:  Patient appears confused regarding this hospitalization.   Cultural/Language/Spiritual Considerations:  None   Adjustment to Illness:  Patient appeared agitated when this writer attempted visiting with her. She appears to be having difficulties accepting her hospitalization and need for additional supports.     Physical Health  Reason for Admission:    1. Cerebrovascular accident (CVA), unspecified mechanism (H)    2. Aphasia    3. Essential hypertension, malignant    4. Personal history of transient cerebral ischemia      Services Needed/Recommended:  TCU    Mental Health/Chemical Dependency  Diagnosis:  Patient's daughter reports a history of depression and passive suicidal ideation. Patient denied any mental health history or SI. Patient's daughter reports a history of \"addiction to pills,\" specifically, Ambien. Patient's daughter reports patient received CD treatment in [place] 3 years ago. Patient experienced 2 past Ambien overdoses. She also reports concerns re: opiate use, however patient denies and daughter cannot \"prove.\"    Support/Services in Place:  Patient was living with her daughter in [place] until she recently came to visit [place] " "in August and has been staying with her friend Kasi. Patient's daughter is unsure of Don's supportiveness.   Services Needed/Recommended:  TCU    Support System  Significant relationship at present time:  Patient has 3 daughters (2 live in [facility], 1 lives in [place]), patient also has some family in [place], however it is unclear who. Patient currently lives with her friend Kasi, it is unclear Don's supportiveness.     Family of origin is available for support:  Patient's daughter is open to her mother returning to [place] when medically appropriate as she lives in a small town and does not feel patient will receive the necessary medical follow up needed. Patient's daughters that live in [place] reportedly do not have the physical space to have patient live with them. Patient's daughterKristy reportedly works with people who have experienced strokes and would like to be involved in discharge planning.   Other support available:  Patient identifies friend Kasi as supportive, it is unclear to this writer his level of involvement.   Gaps in support system:  Unknown.   Patient is caregiver to:  None     Provider Information   Primary Care Physician:  Chaka Knott   [phone]   Clinic:  [address] [place] [id number]      :  None     Financial   Income Source:  SSI   Financial Concerns:  Patient reports having \"no money.\"  Insurance:    Payor/Plan Subscriber Name Rel Member # Group #   MEDICARE - MEDICARE JOLLY EDWARDS  [id number]       ATTN CLAIMS, [address]       Discharge Plan   Patient and family discharge goal:  Patient desires to discharge to Don's home. Patient's family believes patient needs additional supports. Patient's daughterCelina believes placement at a TCU in [place] would be best.    Provided education on discharge plan:  This writer informed patient that the medical team is worried about patient discharging home.   Patient agreeable to discharge plan:  NO  A list of Medicare " "Certified Facilities was provided to the patient and/or family to encourage patient choice. Patient's choices for facility are:  None at this time.   Will NH provide Skilled rehabilitation or complex medical:  Unknown at this time.   General information regarding anticipated insurance coverage and possible out of pocket cost was discussed. Patient and patient's family are aware patient may incur the cost of transportation to the facility, pending insurance payment: NO  Barriers to discharge:  Patient's current cognitive concerns, patient's non decisional and reports her plan is to discharge to Don's house.     Discharge Recommendations   Anticipated Disposition:  Facility:  TBD  Transportation Needs:  TBD  Name of Transportation Company and Phone:  TBD    Additional comments   While meeting with patient she appears to have difficulties recalling information. This writer inquired about patient's admission to the hospital. Patient reports she was admitted due to making \"smart remarks.\" Patient was admitted due to having a stroke. Patient had difficulties with speech. Patient would become agitated when this writer would ask patient questions. Patient expressed frustration due to not knowing the plan for discharge and identified feeling as though she was a \"prisoner.\" This writer explained the teams concerns about patient discharging home without additional services. The medical team has also explained this to patient.     This writer spoke with patient's eldest daughter Celina who wants to ensure patient remains in [place] to receive the best medical care as she is worried about the limited speciality care in [place] where she lives. Patient has been living with her daughter, Celina on and off for the past 10 years. Patient was independent prior to admission to the hospital.  Celina reports concerns re: to patient's history of substance use, specifically Ambien and opiates. She reports no concerns over the past year, however " is unsure as patient has been in [place] since August as she took a bus to [place] to visit. Patient was residing with her friend Kasi in [place], [place]. Celina would like to be involved in discharge planning. She also requested patient's other daughter, Kristy be involved as she lives in [place], [place] and works with people who have experienced strokes. Celina would like patient to transition to a TCU in [place]. Patient is not agreeable to transitioning to a TCU at this time. SW will attempt to visit with patient again and discuss placement options with her daughters. This writer encouraged daughters to encourage her mom to consider TCU.     Leah De La Cruz, LGSW  Weekend   [phone]

## 2017-09-16 NOTE — PLAN OF CARE
Problem: Goal Outcome Summary  Goal: Goal Outcome Summary  SLP: Pt seen for f/u language tx. Pt continues to have difficulty expressing her wants and needs, and understanding information provided to her. Pt benefits from explanations and instructions in simple language and simple sentences, with comprehension checks in the form of yes/no questions.   Pt became highly emotional during session, feels she is not supported and she would like to leave this hospital and live with her friend. Counseling provided, RN notified, pt may benefit from SW visit to discuss options.   SLP: Plan to f/u with pt for continued language tx per POC. Pt will benefit from continued SLP tx at discharge.

## 2017-09-16 NOTE — PLAN OF CARE
Problem: Goal Outcome Summary  Goal: Goal Outcome Summary  OT 6A: Cancel.  Pt working with SLP upon initial attempt, upon return pt appears frustrated, declines participation in OT today.

## 2017-09-16 NOTE — PROGRESS NOTES
[facility], [place]   Neurology Daily Note  Sowmya Gibbs  [id number]  09/16/2017    Subjective:     implantable cardiac monitor at placed. Patient still has difficulty in finding words. Other than that ready to discharge to home.    Objective:   /66 (BP Location: Left arm)  Pulse 64  Temp 97  F (36.1  C) (Oral)  Resp 16  Wt 52.6 kg (116 lb)  LMP 01/25/2003  SpO2 97%  BMI 19.3 kg/m2  General: Awake and alert, not in any acute distress, cooperative  HEENT: Atraumatic, normocephalis, no scleral icterus or conjunctival pallor   Cardiac: RRR, S1, S2 no S3/S4, no murmurs  Chest: Clear to auscultation bilaterally, no wheezes, rubs or crepitations  Abdomen: Soft, non-tender, non-distended  Extremities: No LE swelling.  Skin: No rash or lesion.   Psych: Normal mood and affect  Neuro:  Mental status: Awake, alert, attentive, oriented to p/p/t. Speech remains aphasic, able to follow simple commands. Difficulty with more complex commands.  Cranial nerves: CN2-12 tested and no significant findings appreciated. Eyes conjugate, PERRLA, EOMI, visual fields full, face symmetric, facial sensation intact, shoulder shrug strong, palate rise symmetric, tongue/uvula midline, hearing intact to conversation.  Motor: Tone normal. 5/5 strength in all 4 extremities. No atrophy or twitches. No pronator drift.  Reflexes: 2+ reflexic and symmetric biceps, brachioradialis, patellar, and achilles. No crossed adductors or spread. Toes down-going.  Sensory: Intact to LT  x 4 extremities  Coordination: FNF intact bilaterally, no dysmetria.      Lab Investigations:   Hemoglobin A1C   Date Value Ref Range Status   09/11/2017 5.6 4.3 - 6.0 % Final   09/04/2015 5.3 4.3 - 6.0 % Final   03/20/2015 5.7 4.3 - 6.0 % Final   08/06/2014 6.2 (H) 4.3 - 6.0 % Final   11/06/2007 5.4 4.3 - 6.0 % Final     Lab Results   Component Value Date    LDL 88 09/11/2017     Assessment and Plan   Sowmya Gibbs is a 71 year old  year old right handed female with multiple stroke risk factors including HTN, DMII, HLD, previous strokes and a history of RCC s/p laparoscopic partial nephrectomy in 204, CKD who presented on 9/10/2017 with an acute onset aphasia found to have anterior and middle left temporal lobe acute infarcts.      #anterior and middle left temporal lobe acute infarcts  Patient with a significant atheroma in the aortic arch and descending thoracic aorta. 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 Patient was reviewed by CTVS with impression that mobile mass is not likely a source of emboli with her stroke presentation.   - Patient will need dual antiplatelet course of 3 months at discharge with implantable cardiac event monitor placed.   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 Not likely source of emboli with her stroke presentation.  - Recommend follow-up with cardiology for periodic monitoring of MR      #prominent mediastinal lymph notes  - Discussed with radiology on 9/13, likely reactive as patient also has centrilobular emphysema  - For follow up CT as outpatient within 3-6 months     #10mm lesion along the superior pole of the right kidney  - Likely residual from prior intervention after discussion with radiology  - For follow up CT as outpatient within 3-6 months    #headaches  - Still complaining of persistent headaches  - Add scheduled acetaminophen  - Add magnesium supplements  - Ibuprofen PRN    Chronic issues:  #HTN  - Continue carvidelol 12.5mg  - Labetalol 10mg PRN     #HLD  - Lipid profile: HDL 28 (L),  (H), LDL 88  - Atorvastatin 80     #DMII  - Continue consistent carbs and glucose  checks     #Hypothyroidism  - Continue L-thyroxine 88mcg     FEN: Tolerating PO  PPx: Heparin 5,000 U  Code: FULL  Dispo: OT recs full supervision versus TCU. Awaiting TCU placement.      Patient was seen and discussed with Dr. Dylan Paredes.    Sherief Boss   Neurocritical care fellow  Pager [phone]

## 2017-09-16 NOTE — PLAN OF CARE
Problem: Goal Outcome Summary  Goal: Goal Outcome Summary  Outcome: No Change  VSS except alex at times, coreg held this am for HR of 49.  Neuros intact except continues with mod-severe aphasia and cognitive deficits.  Endorses HA this shift, norco and ibuprofen given with good relief.  Voids spont, not measuring.  Regular diet, on calorie counts, has had 5 small meals today. No PIV, MDs aware.  Up independently in room, declines walk into halls all shift.  Pt had loop recorder placed yesterday, primapore to Left chest, c/d/i.  Awaiting TCU placement.  Continue with POC.

## 2017-09-17 ENCOUNTER — APPOINTMENT (OUTPATIENT)
Dept: SPEECH THERAPY | Facility: CLINIC | Age: 72
DRG: 040 | End: 2017-09-17
Payer: MEDICARE

## 2017-09-17 LAB
GLUCOSE BLDC GLUCOMTR-MCNC: 108 MG/DL (ref 70–99)
GLUCOSE BLDC GLUCOMTR-MCNC: 147 MG/DL (ref 70–99)
GLUCOSE BLDC GLUCOMTR-MCNC: 185 MG/DL (ref 70–99)
GLUCOSE BLDC GLUCOMTR-MCNC: 91 MG/DL (ref 70–99)

## 2017-09-17 PROCEDURE — 12000001 ZZH R&B MED SURG/OB UMMC

## 2017-09-17 PROCEDURE — A9270 NON-COVERED ITEM OR SERVICE: HCPCS | Mod: GY | Performed by: STUDENT IN AN ORGANIZED HEALTH CARE EDUCATION/TRAINING PROGRAM

## 2017-09-17 PROCEDURE — 40000225 ZZH STATISTIC SLP WARD VISIT: Performed by: SPEECH-LANGUAGE PATHOLOGIST

## 2017-09-17 PROCEDURE — 25000132 ZZH RX MED GY IP 250 OP 250 PS 637: Mod: GY | Performed by: STUDENT IN AN ORGANIZED HEALTH CARE EDUCATION/TRAINING PROGRAM

## 2017-09-17 PROCEDURE — 25000128 H RX IP 250 OP 636: Performed by: PSYCHIATRY & NEUROLOGY

## 2017-09-17 PROCEDURE — A9270 NON-COVERED ITEM OR SERVICE: HCPCS | Mod: GY | Performed by: PSYCHIATRY & NEUROLOGY

## 2017-09-17 PROCEDURE — 92507 TX SP LANG VOICE COMM INDIV: CPT | Mod: GN | Performed by: SPEECH-LANGUAGE PATHOLOGIST

## 2017-09-17 PROCEDURE — 00000146 ZZHCL STATISTIC GLUCOSE BY METER IP

## 2017-09-17 PROCEDURE — 25000132 ZZH RX MED GY IP 250 OP 250 PS 637: Mod: GY | Performed by: PSYCHIATRY & NEUROLOGY

## 2017-09-17 RX ADMIN — HEPARIN SODIUM 5000 UNITS: 5000 INJECTION, SOLUTION INTRAVENOUS; SUBCUTANEOUS at 09:10

## 2017-09-17 RX ADMIN — IBUPROFEN 600 MG: 600 TABLET ORAL at 00:31

## 2017-09-17 RX ADMIN — HEPARIN SODIUM 5000 UNITS: 5000 INJECTION, SOLUTION INTRAVENOUS; SUBCUTANEOUS at 20:54

## 2017-09-17 RX ADMIN — ASPIRIN 81 MG: 81 TABLET, COATED ORAL at 09:10

## 2017-09-17 RX ADMIN — LORAZEPAM 1 MG: 1 TABLET ORAL at 09:10

## 2017-09-17 RX ADMIN — ATORVASTATIN CALCIUM 80 MG: 40 TABLET, FILM COATED ORAL at 09:11

## 2017-09-17 RX ADMIN — IBUPROFEN 600 MG: 600 TABLET ORAL at 06:27

## 2017-09-17 RX ADMIN — HYDROCODONE BITARTRATE AND ACETAMINOPHEN 2 TABLET: 7.5; 325 TABLET ORAL at 09:21

## 2017-09-17 RX ADMIN — CLOPIDOGREL 75 MG: 75 TABLET, FILM COATED ORAL at 09:10

## 2017-09-17 RX ADMIN — HYDROCODONE BITARTRATE AND ACETAMINOPHEN 2 TABLET: 7.5; 325 TABLET ORAL at 17:22

## 2017-09-17 RX ADMIN — IBUPROFEN 600 MG: 600 TABLET ORAL at 13:33

## 2017-09-17 RX ADMIN — Medication 500 MG: at 09:11

## 2017-09-17 RX ADMIN — IBUPROFEN 600 MG: 600 TABLET ORAL at 20:54

## 2017-09-17 RX ADMIN — ACETAMINOPHEN 650 MG: 325 TABLET ORAL at 00:31

## 2017-09-17 RX ADMIN — LEVOTHYROXINE SODIUM 88 MCG: 88 TABLET ORAL at 06:27

## 2017-09-17 RX ADMIN — HYDROCODONE BITARTRATE AND ACETAMINOPHEN 2 TABLET: 7.5; 325 TABLET ORAL at 03:27

## 2017-09-17 ASSESSMENT — VISUAL ACUITY
OU: NORMAL ACUITY;GLASSES;BASELINE
OU: NORMAL ACUITY
OU: NORMAL ACUITY
OU: NORMAL ACUITY;GLASSES;BASELINE
OU: NORMAL ACUITY
OU: NORMAL ACUITY

## 2017-09-17 ASSESSMENT — PAIN DESCRIPTION - DESCRIPTORS
DESCRIPTORS: HEADACHE

## 2017-09-17 NOTE — PLAN OF CARE
Problem: Goal Outcome Summary  Goal: Goal Outcome Summary  Outcome: No Change  Pt admitted for ischemic stroke workup on 9/10. VSS. Neurologically intact except for expressive aphasia; difficult to assess orientation d/t aphasia. Gets frustrated easily, needs encouragement to stay calm and take her time finding words. No PIV; MD aware. Tolerating regular diet, on allison counts; needs help ordering meals d/t aphasia. Independent with cares, door alarm on. Voiding spontaneously without difficulties. Loop recorder placed on L side 9/15, new gauze dressing placed over incision. Chronic migraine pain managed with Tylenol, Ibuprofen and [place]. Awaiting TCU placement. Will continue to monitor and follow plan of care.

## 2017-09-17 NOTE — PROGRESS NOTES
[facility], [place]   Neurology Daily Note  Sowmya Gibbs  [id number]  09/17/2017    Subjective: Patient sitting up and eating breakfast. Still frustrated with aphasia. Otherwise no complaints.    Objective:   /55 (BP Location: Left arm)  Pulse 67  Temp 96.6  F (35.9  C) (Oral)  Resp 16  Wt 50.6 kg (111 lb 8 oz)  LMP 01/25/2003  SpO2 97%  BMI 18.55 kg/m2  General: Awake and alert, not in any acute distress, cooperative  HEENT: Atraumatic, normocephalic, no scleral icterus or conjunctival pallor   Cardiac: RRR, S1, S2 no S3/S4, no murmurs  Chest: Clear to auscultation bilaterally, no wheezes, rubs or crepitations  Abdomen: Soft, non-tender, non-distended  Extremities: No LE swelling.  Skin: No rash or lesion.   Neuro:  Mental status: Awake, alert, attentive. Speech remains aphasic, able to follow simple commands. Difficulty with more complex commands.  Cranial nerves: CN2-12 tested and no significant findings appreciated. Eyes conjugate, PERRLA, EOMI, visual fields full, face symmetric, facial sensation intact, shoulder shrug strong, palate rise symmetric, tongue/uvula midline, hearing intact to conversation.  Motor: Tone normal. 5/5 strength in all 4 extremities. No atrophy or twitches. No pronator drift.  Reflexes: 2+ reflexic and symmetric biceps, brachioradialis, patellar, and achilles. No crossed adductors or spread. Toes down-going.  Sensory: Intact to LT  x 4 extremities  Coordination: FNF intact bilaterally, no dysmetria.    Lab Investigations:   Hemoglobin A1C   Date Value Ref Range Status   09/11/2017 5.6 4.3 - 6.0 % Final   09/04/2015 5.3 4.3 - 6.0 % Final   03/20/2015 5.7 4.3 - 6.0 % Final   08/06/2014 6.2 (H) 4.3 - 6.0 % Final   11/06/2007 5.4 4.3 - 6.0 % Final     Lab Results   Component Value Date    LDL 88 09/11/2017     Imaging:  None new    Assessment and Plan   Sowmya Gibbs is a 71 year old year old right handed female with multiple stroke risk  factors including HTN, DMII, HLD, previous strokes and a history of RCC s/p laparoscopic partial nephrectomy in 204, CKD who presented on 9/10/2017 with an acute onset aphasia found to have anterior and middle left temporal lobe acute infarcts.       #anterior and middle left temporal lobe acute infarcts  Patient with a significant atheroma in the aortic arch and descending thoracic aorta. 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 Patient was reviewed by CTVS with impression that mobile mass is not likely a source of emboli with her stroke presentation.   - Patient will need dual antiplatelet course of 3 months at discharge with implantable cardiac event monitor placed.   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 Not likely source of emboli for her current stroke.  - Recommend follow-up with cardiology for periodic monitoring of MR       #prominent mediastinal lymph notes  - Discussed with radiology on 9/13, likely reactive as patient also has centrilobular emphysema  - For follow up CT as outpatient within 3-6 months      #10mm lesion along the superior pole of the right kidney  - Likely residual from prior intervention after discussion with radiology  - For follow up CT as outpatient within 3-6 months     #headaches  - On scheduled acetaminophen  - On magnesium supplements  - Ibuprofen PRN     Chronic issues:  #HTN  - Continue carvidelol 12.5mg (to withhold if HR <60)  - Labetalol 10mg PRN      #HLD  - Lipid profile: HDL 28 (L),  (H), LDL 88  - Atorvastatin 80      #DMII  - Continue consistent carbs and glucose checks      #Hypothyroidism  - Continue L-thyroxine 88mcg      FEN: Tolerating PO  PPx: Heparin 5,000  U  Code: FULL  Dispo: OT recs full supervision versus TCU. Awaiting TCU placement.      Patient was seen and discussed with Dr. Dylan Paredes.    JAYLEN Cervantes [facility] SHARONA, MSc(Res)  Neurology Resident, PGY-1  Pager: [phone]

## 2017-09-17 NOTE — PROGRESS NOTES
Nurse Care Coordination was consulted to assess needs of patient following stroke. Chart was reviewed. Nurse care coordination needs are not identified at this time and social work will continue to follow. Please re-consult nurse care coordinator if additional needs are identified.       Eliz Leon RN BSN, PHN RN Care Coordinator  Weekend Coverage  Pager [phone]

## 2017-09-17 NOTE — PLAN OF CARE
Problem: Goal Outcome Summary  Goal: Goal Outcome Summary  Outcome: No Change  VSS except alex at times, coreg held x 2 today for HR < 60.  Neuros intact except continues with severe aphasia and cognitive deficits.  Endorses HA this shift, norco and ibuprofen given with good relief.  Voids spont, not measuring.  Regular diet, on calorie counts, has had 4 small meals today. No PIV, MDs aware.  Up independently in room, declines walk into halls all shift.  Pt had loop recorder placed Friday, primapore to Left chest, c/d/i.  Awaiting TCU placement.  Continue with POC.

## 2017-09-17 NOTE — PLAN OF CARE
Problem: Goal Outcome Summary  Goal: Goal Outcome Summary  Pt seen for language tx-- pt remains mod to severely receptive and expressively aphasic. Pt was able to complete simple phrase completion tasks with maximum support- with spontaneous fill in the blank with the phrase- pt was able to to with 2/12 attempts; when provided with a phonemic cue-- pt would attempt words but still paraphasic errors and also some sound sequencing errors- pt benefitted from this clinician writing the word on her white board and then pt was able to say it with repeated attempts. Attempted basic level reading comprehension task-single phrase-and choosing word that best goes with the phrase from a field of 3- this was still very difficult for pt but when limited to choice of 2 then better success. With basic level auditory comprehension task- answering simple yes/no questions- pt able to with 4/8 accuracy- needing multiple repetitions to aid in auditory processing. Would recommend inpatient rehab ARU- vs TCU - prior notes indicating pt wanting to d/c to home with friend but 2/2 the severity of pt's aphasia- pt would not be safe to be left alone- would need 24 hour supervision if d/c's to home

## 2017-09-17 NOTE — PROGRESS NOTES
Social Work Services Progress Note  Hospital Day: 8  Date of Initial Social Work Evaluation:  9/16/2017  Collaborated with: Daughter Kristy     Data:  Discharge Planning    Intervention:  This writer contacted patient's daughter, Kristy, regarding discharge placement. Kristy is currently working at this time and requested to call this writer back during her break to discuss discharge planning.     Assessment:  Per chart, patient was deemed non decisional by psychiatry. Patient's eldest daughter Celina and her daughter Kristy are involved with discharge planning.     Plan:    Anticipated Disposition:  Facility:  [facility]    Barriers to d/c plan:  Patient's current cognitive concerns, patient being deemed non decisional    Follow Up:  This writer to continue to attempt to meet with patient and discuss placement options with daughters.    Elvira Cooper, LICSW  Weekend   [phone]    ADDENDUM: This writer attempted to contact daughter as she did not hear back from her earlier today. Daughter Kristy answered but then the call was cut off and this writer was unable to speak with her. Attempted to call back but the line was busy.

## 2017-09-17 NOTE — PROGRESS NOTES
Calorie Counts    Intake recorded for: 9/16 Kcals: 2087  Protein: 74g    # Meals recorded: Breakfast: 100% 2 popsicles, hashbrowns, 2 steel strips, 75% blueberry muffin, <25% 2 pancakes with syrup and butter          Lunch: 100% hamburger with lettuce and tomato, <25% hot turkey sandwich with chicken gravy          Dinner: 100% 2 alice food cake with strawberry and whip topping, 2 vanilla ice cream, hamburger with american cheese    # Supplements recorded: No supplements recorded

## 2017-09-18 ENCOUNTER — APPOINTMENT (OUTPATIENT)
Dept: OCCUPATIONAL THERAPY | Facility: CLINIC | Age: 72
DRG: 040 | End: 2017-09-18
Payer: MEDICARE

## 2017-09-18 LAB
GLUCOSE BLDC GLUCOMTR-MCNC: 136 MG/DL (ref 70–99)
GLUCOSE BLDC GLUCOMTR-MCNC: 88 MG/DL (ref 70–99)
GLUCOSE BLDC GLUCOMTR-MCNC: 93 MG/DL (ref 70–99)
GLUCOSE BLDC GLUCOMTR-MCNC: 96 MG/DL (ref 70–99)

## 2017-09-18 PROCEDURE — 25000128 H RX IP 250 OP 636: Performed by: PSYCHIATRY & NEUROLOGY

## 2017-09-18 PROCEDURE — 40000133 ZZH STATISTIC OT WARD VISIT

## 2017-09-18 PROCEDURE — A9270 NON-COVERED ITEM OR SERVICE: HCPCS | Mod: GY | Performed by: STUDENT IN AN ORGANIZED HEALTH CARE EDUCATION/TRAINING PROGRAM

## 2017-09-18 PROCEDURE — 25000132 ZZH RX MED GY IP 250 OP 250 PS 637: Mod: GY | Performed by: PSYCHIATRY & NEUROLOGY

## 2017-09-18 PROCEDURE — 00000146 ZZHCL STATISTIC GLUCOSE BY METER IP

## 2017-09-18 PROCEDURE — 12000001 ZZH R&B MED SURG/OB UMMC

## 2017-09-18 PROCEDURE — 97530 THERAPEUTIC ACTIVITIES: CPT | Mod: GO

## 2017-09-18 PROCEDURE — 25000132 ZZH RX MED GY IP 250 OP 250 PS 637: Mod: GY | Performed by: STUDENT IN AN ORGANIZED HEALTH CARE EDUCATION/TRAINING PROGRAM

## 2017-09-18 PROCEDURE — A9270 NON-COVERED ITEM OR SERVICE: HCPCS | Mod: GY | Performed by: PSYCHIATRY & NEUROLOGY

## 2017-09-18 RX ORDER — DEXTROSE MONOHYDRATE 25 G/50ML
25-50 INJECTION, SOLUTION INTRAVENOUS
Status: DISCONTINUED | OUTPATIENT
Start: 2017-09-18 | End: 2017-09-21 | Stop reason: HOSPADM

## 2017-09-18 RX ORDER — NICOTINE POLACRILEX 4 MG
15-30 LOZENGE BUCCAL
Status: DISCONTINUED | OUTPATIENT
Start: 2017-09-18 | End: 2017-09-21 | Stop reason: HOSPADM

## 2017-09-18 RX ADMIN — ATORVASTATIN CALCIUM 80 MG: 40 TABLET, FILM COATED ORAL at 08:27

## 2017-09-18 RX ADMIN — CARVEDILOL 12.5 MG: 3.12 TABLET, FILM COATED ORAL at 19:05

## 2017-09-18 RX ADMIN — ACETAMINOPHEN 650 MG: 325 TABLET ORAL at 00:35

## 2017-09-18 RX ADMIN — HYDROCODONE BITARTRATE AND ACETAMINOPHEN 2 TABLET: 7.5; 325 TABLET ORAL at 00:35

## 2017-09-18 RX ADMIN — HEPARIN SODIUM 5000 UNITS: 5000 INJECTION, SOLUTION INTRAVENOUS; SUBCUTANEOUS at 08:29

## 2017-09-18 RX ADMIN — HYDROCODONE BITARTRATE AND ACETAMINOPHEN 2 TABLET: 7.5; 325 TABLET ORAL at 20:46

## 2017-09-18 RX ADMIN — Medication 500 MG: at 08:27

## 2017-09-18 RX ADMIN — ASPIRIN 81 MG: 81 TABLET, COATED ORAL at 08:26

## 2017-09-18 RX ADMIN — HEPARIN SODIUM 5000 UNITS: 5000 INJECTION, SOLUTION INTRAVENOUS; SUBCUTANEOUS at 19:06

## 2017-09-18 RX ADMIN — HYDROCODONE BITARTRATE AND ACETAMINOPHEN 2 TABLET: 7.5; 325 TABLET ORAL at 14:17

## 2017-09-18 RX ADMIN — IBUPROFEN 600 MG: 600 TABLET ORAL at 03:47

## 2017-09-18 RX ADMIN — HYDROCODONE BITARTRATE AND ACETAMINOPHEN 2 TABLET: 7.5; 325 TABLET ORAL at 08:27

## 2017-09-18 RX ADMIN — LEVOTHYROXINE SODIUM 88 MCG: 88 TABLET ORAL at 08:27

## 2017-09-18 RX ADMIN — ACETAMINOPHEN 650 MG: 325 TABLET ORAL at 19:05

## 2017-09-18 RX ADMIN — CLOPIDOGREL 75 MG: 75 TABLET, FILM COATED ORAL at 08:27

## 2017-09-18 ASSESSMENT — PAIN DESCRIPTION - DESCRIPTORS
DESCRIPTORS: HEADACHE

## 2017-09-18 ASSESSMENT — VISUAL ACUITY
OU: NORMAL ACUITY

## 2017-09-18 NOTE — PLAN OF CARE
Problem: Goal Outcome Summary  Goal: Goal Outcome Summary  Outcome: No Change  AVSS, intermittently alex. Neuros intact besides severe aphasia and cognitive defects. Difficult to assess orientation d/t aphasia. PRN ibuprofen and norco given for c/o HA pain. Regular diet, on allison counts throughout today, good PO intake. Needs assistance with ordering meals. Voiding spon. Pt up independently. No IV, okay per MD's. Awaiting placement. Continue with POC.

## 2017-09-18 NOTE — PROGRESS NOTES
Calorie Counts    Intake Recorded For: 9/17 Kcals: 2522 Protein: 89g    # Meals Recorded: 3 meals    # Supplements Recorded: 0

## 2017-09-18 NOTE — PROGRESS NOTES
[facility], [place]   Neurology Daily Note  Sowmya Gibbs  [id number]  09/18/2017    Subjective:  Patient has no new complaints. No acute events overnight. Asking about when she will be able to leave the hospital and expresses frustration with situation.    Objective:   /64 (BP Location: Right arm)  Pulse 65  Temp 96.4  F (35.8  C) (Oral)  Resp 18  Wt 50.6 kg (111 lb 8 oz)  LMP 01/25/2003  SpO2 96%  BMI 18.55 kg/m2  General: Awake and alert, not in any acute distress, cooperative  HEENT: Atraumatic, normocephalic, no scleral icterus or conjunctival pallor   Cardiac: RRR, S1, S2 no S3/S4, no murmurs  Chest: Clear to auscultation bilaterally, no wheezes, rubs or crepitations  Abdomen: Soft, non-tender, non-distended  Extremities: No LE swelling.  Skin: No rash or lesion.   Psych: Normal mood and affect  Neuro:  Mental status: Awake, alert, attentive. Speech remains aphasic however patient is able to follow some 2 step commands.  Cranial nerves: CN2-12 tested and no significant findings appreciated. Eyes conjugate, PERRL, EOMI, visual fields full, face symmetric, facial sensation intact, shoulder shrug strong, palate rise symmetric, tongue/uvula midline, hearing intact to conversation.  Motor: Tone normal. 5/5 strength in all 4 extremities. No atrophy or twitches. No pronator drift.  Reflexes: Normoreflexic and symmetric biceps, brachioradialis, patellar, and achilles.   Sensory: Intact to LT x 4 extremities  Coordination: FNF intact bilaterally, no dysmetria.  Gait: Not assessed.    Lab Investigations:   Hemoglobin A1C   Date Value Ref Range Status   09/11/2017 5.6 4.3 - 6.0 % Final   09/04/2015 5.3 4.3 - 6.0 % Final   03/20/2015 5.7 4.3 - 6.0 % Final   08/06/2014 6.2 (H) 4.3 - 6.0 % Final   11/06/2007 5.4 4.3 - 6.0 % Final     Lab Results   Component Value Date    LDL 88 09/11/2017     Imaging:  None new    Assessment and Plan   Sowmya Gibbs is a 71 year old year old  female h/o prior strokes, HTN, DMII, HLD and a history of RCC s/p laparoscopic partial nephrectomy who presented 9/10/2017 with an acute onset aphasia 12 hours after symptom onset. Found to have anterior and middle left temporal lobe acute infarcts.    Assessment and Plan   Sowmya Gibbs is a 71 year old year old right handed female with multiple stroke risk factors including HTN, DMII, HLD, previous strokes and a history of RCC s/p laparoscopic partial nephrectomy in 204, CKD who presented on 9/10/2017 with an acute onset aphasia found to have anterior and middle left temporal lobe acute infarcts.       #anterior and middle left temporal lobe acute infarcts  Patient with a significant atheroma in the aortic arch and descending thoracic aorta. 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 Patient was reviewed by CTVS with impression that mobile mass is not likely a source of emboli with her stroke presentation.   - Patient will need dual antiplatelet course of 3 months at discharge.  - Patient has had implantable cardiac event monitor placed on 9/15/17.  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 Mobile mass seen on PARVEZ is not likely source of emboli for her current stroke.  - Recommend follow-up with cardiology for periodic monitoring of MR.       #prominent mediastinal lymph notes  - Discussed with radiology on 9/13, likely reactive as patient also has centrilobular emphysema  - For follow up CT as outpatient within 3-6 months      #10mm lesion along the superior pole of the right kidney  - Represent a hemorrhagic or proteinaceous cyst, solid mass or residual  from prior intervention, likely residual after discussion  with radiology  - Consider doing renal imaging to further delineate lesion      #headaches  - On scheduled acetaminophen  - On magnesium supplements  - Ibuprofen PRN    #Non-severe malnutrition  - 10Ib weight loss within the last 2 months as noted by her daughter, weight loss of 5% x 3 months with subcutaneous fat loss  - RN noted good PO intake    Chronic issues:  #HTN  - Continue carvidelol 12.5mg (to withhold if HR <60)  - Labetalol 10mg PRN      #HLD  - Lipid profile: HDL 28 (L),  (H), LDL 88  - Atorvastatin 80      #DMII  - Continue consistent carbs and glucose checks      #Hypothyroidism  - Continue L-thyroxine 88mcg      FEN: Tolerating PO  PPx: Heparin 5,000 U  Code: FULL  Dispo: OT recs full supervision versus TCU. Awaiting TCU placement.      Patient was seen and discussed with Dr. Macdonald.     JAYLEN Cervantes [facility] SHARONA, MSc(Res)  Neurology Resident, PGY-1  Pager: [phone]

## 2017-09-18 NOTE — PLAN OF CARE
Problem: Goal Outcome Summary  Goal: Goal Outcome Summary  Outcome: No Change  Patient care [id number]     VSS except intermittently alex. Neuros include severe aphasia and cognitive deficits. C/o HA, given Ibuprofen with relief. Regular diet, tolerating well. Good PO intake. Voiding spont. Up independently. No PIV, MD's aware. Awaiting placement. Continue to monitor and follow POC.

## 2017-09-18 NOTE — PLAN OF CARE
Problem: Goal Outcome Summary  Goal: Goal Outcome Summary  Discharge Planner OT   Patient plan for discharge: Patient will require 24 hour assist and supervision at discharge.  Current status: Patient ambulates ~700 ft with SBA, requires mod A for one and two step path finding directions 2/2 cognition. Patient performs med management task, requires mod A for 3/5 meds and min A on 1/5 meds. Patient demos decreased insight into cognition deficits impacting safety.  Barriers to return to prior living situation: Cognition including insight into safety.  Recommendations for discharge: home with 24 hour supervision/assist and OP OT to address cognition vs TCU  Rationale for recommendations: Significant safety concern       Entered by: Arabella Short 09/18/2017 2:26 PM

## 2017-09-18 NOTE — PLAN OF CARE
Problem: Goal Outcome Summary  Goal: Goal Outcome Summary  Patient has aphasia and is difficult for her to express her needs/concerns, offered writing pad, refused, alert and oriented, she stated that she is not confused and knows what is going on but the words are not coming out as she wants and it makes her to give up speaking, so needs some encouragement on that.  Patient is on caolrie counts and needs assist to order meals, does not order much but finishes what she orders.  BS 93, 88. SBA, wants to go for walks this evening, uses BR,  Pain is being managed with Norco, does not want tylenol.  Voids and not saving, continue to monitor.

## 2017-09-19 ENCOUNTER — APPOINTMENT (OUTPATIENT)
Dept: SPEECH THERAPY | Facility: CLINIC | Age: 72
DRG: 040 | End: 2017-09-19
Payer: MEDICARE

## 2017-09-19 PROBLEM — Z95.818 STATUS POST PLACEMENT OF IMPLANTABLE LOOP RECORDER: Status: ACTIVE | Noted: 2017-09-19

## 2017-09-19 LAB
ANION GAP SERPL CALCULATED.3IONS-SCNC: 7 MMOL/L (ref 3–14)
BUN SERPL-MCNC: 37 MG/DL (ref 7–30)
CALCIUM SERPL-MCNC: 9.1 MG/DL (ref 8.5–10.1)
CHLORIDE SERPL-SCNC: 109 MMOL/L (ref 94–109)
CO2 SERPL-SCNC: 24 MMOL/L (ref 20–32)
CREAT SERPL-MCNC: 1.77 MG/DL (ref 0.52–1.04)
ERYTHROCYTE [DISTWIDTH] IN BLOOD BY AUTOMATED COUNT: 15.1 % (ref 10–15)
GFR SERPL CREATININE-BSD FRML MDRD: 28 ML/MIN/1.7M2
GLUCOSE BLDC GLUCOMTR-MCNC: 103 MG/DL (ref 70–99)
GLUCOSE BLDC GLUCOMTR-MCNC: 129 MG/DL (ref 70–99)
GLUCOSE BLDC GLUCOMTR-MCNC: 131 MG/DL (ref 70–99)
GLUCOSE BLDC GLUCOMTR-MCNC: 138 MG/DL (ref 70–99)
GLUCOSE SERPL-MCNC: 105 MG/DL (ref 70–99)
HCT VFR BLD AUTO: 45 % (ref 35–47)
HGB BLD-MCNC: 14.5 G/DL (ref 11.7–15.7)
MCH RBC QN AUTO: 31.9 PG (ref 26.5–33)
MCHC RBC AUTO-ENTMCNC: 32.2 G/DL (ref 31.5–36.5)
MCV RBC AUTO: 99 FL (ref 78–100)
PLATELET # BLD AUTO: 265 10E9/L (ref 150–450)
POTASSIUM SERPL-SCNC: 4.9 MMOL/L (ref 3.4–5.3)
RBC # BLD AUTO: 4.55 10E12/L (ref 3.8–5.2)
SODIUM SERPL-SCNC: 140 MMOL/L (ref 133–144)
WBC # BLD AUTO: 6.6 10E9/L (ref 4–11)

## 2017-09-19 PROCEDURE — 25000132 ZZH RX MED GY IP 250 OP 250 PS 637: Mod: GY | Performed by: STUDENT IN AN ORGANIZED HEALTH CARE EDUCATION/TRAINING PROGRAM

## 2017-09-19 PROCEDURE — A9270 NON-COVERED ITEM OR SERVICE: HCPCS | Mod: GY | Performed by: STUDENT IN AN ORGANIZED HEALTH CARE EDUCATION/TRAINING PROGRAM

## 2017-09-19 PROCEDURE — 40000225 ZZH STATISTIC SLP WARD VISIT: Performed by: SPEECH-LANGUAGE PATHOLOGIST

## 2017-09-19 PROCEDURE — 80048 BASIC METABOLIC PNL TOTAL CA: CPT | Performed by: STUDENT IN AN ORGANIZED HEALTH CARE EDUCATION/TRAINING PROGRAM

## 2017-09-19 PROCEDURE — 85027 COMPLETE CBC AUTOMATED: CPT | Performed by: STUDENT IN AN ORGANIZED HEALTH CARE EDUCATION/TRAINING PROGRAM

## 2017-09-19 PROCEDURE — A9270 NON-COVERED ITEM OR SERVICE: HCPCS | Mod: GY | Performed by: PSYCHIATRY & NEUROLOGY

## 2017-09-19 PROCEDURE — 00000146 ZZHCL STATISTIC GLUCOSE BY METER IP

## 2017-09-19 PROCEDURE — 25000132 ZZH RX MED GY IP 250 OP 250 PS 637: Mod: GY | Performed by: PSYCHIATRY & NEUROLOGY

## 2017-09-19 PROCEDURE — 36415 COLL VENOUS BLD VENIPUNCTURE: CPT | Performed by: STUDENT IN AN ORGANIZED HEALTH CARE EDUCATION/TRAINING PROGRAM

## 2017-09-19 PROCEDURE — 25000128 H RX IP 250 OP 636: Performed by: PSYCHIATRY & NEUROLOGY

## 2017-09-19 PROCEDURE — 92507 TX SP LANG VOICE COMM INDIV: CPT | Mod: GN | Performed by: SPEECH-LANGUAGE PATHOLOGIST

## 2017-09-19 PROCEDURE — 12000008 ZZH R&B INTERMEDIATE UMMC

## 2017-09-19 RX ORDER — CLOPIDOGREL BISULFATE 75 MG/1
75 TABLET ORAL DAILY
Qty: 30 TABLET | Refills: 3 | Status: SHIPPED | OUTPATIENT
Start: 2017-09-19 | End: 2017-09-21

## 2017-09-19 RX ORDER — ATORVASTATIN CALCIUM 80 MG/1
80 TABLET, FILM COATED ORAL DAILY
Qty: 30 TABLET | Refills: 11 | Status: SHIPPED | OUTPATIENT
Start: 2017-09-19 | End: 2017-09-21

## 2017-09-19 RX ORDER — ACETAMINOPHEN 325 MG/1
650 TABLET ORAL EVERY 6 HOURS PRN
Qty: 100 TABLET | Refills: 11 | Status: SHIPPED | OUTPATIENT
Start: 2017-09-19 | End: 2017-09-23

## 2017-09-19 RX ORDER — IBUPROFEN 600 MG/1
600 TABLET, FILM COATED ORAL EVERY 6 HOURS PRN
Qty: 120 TABLET | Refills: 3 | Status: SHIPPED | OUTPATIENT
Start: 2017-09-19 | End: 2017-09-21

## 2017-09-19 RX ORDER — ACETAMINOPHEN 325 MG/1
650 TABLET ORAL EVERY 6 HOURS PRN
Status: DISCONTINUED | OUTPATIENT
Start: 2017-09-19 | End: 2017-09-21 | Stop reason: HOSPADM

## 2017-09-19 RX ORDER — CARVEDILOL 12.5 MG/1
12.5 TABLET ORAL 2 TIMES DAILY WITH MEALS
Qty: 60 TABLET | Refills: 11 | Status: SHIPPED | OUTPATIENT
Start: 2017-09-19 | End: 2017-09-23

## 2017-09-19 RX ORDER — LEVOTHYROXINE SODIUM 88 UG/1
88 TABLET ORAL
Qty: 30 TABLET | Refills: 11 | Status: SHIPPED | OUTPATIENT
Start: 2017-09-19 | End: 2017-09-23

## 2017-09-19 RX ADMIN — HYDROCODONE BITARTRATE AND ACETAMINOPHEN 2 TABLET: 7.5; 325 TABLET ORAL at 04:36

## 2017-09-19 RX ADMIN — Medication 2.5 MG: at 22:10

## 2017-09-19 RX ADMIN — HEPARIN SODIUM 5000 UNITS: 5000 INJECTION, SOLUTION INTRAVENOUS; SUBCUTANEOUS at 22:10

## 2017-09-19 RX ADMIN — LEVOTHYROXINE SODIUM 88 MCG: 88 TABLET ORAL at 09:26

## 2017-09-19 RX ADMIN — HYDROCODONE BITARTRATE AND ACETAMINOPHEN 2 TABLET: 7.5; 325 TABLET ORAL at 22:48

## 2017-09-19 RX ADMIN — Medication 500 MG: at 09:26

## 2017-09-19 RX ADMIN — CLOPIDOGREL 75 MG: 75 TABLET, FILM COATED ORAL at 09:25

## 2017-09-19 RX ADMIN — ASPIRIN 81 MG: 81 TABLET, COATED ORAL at 09:26

## 2017-09-19 RX ADMIN — HEPARIN SODIUM 5000 UNITS: 5000 INJECTION, SOLUTION INTRAVENOUS; SUBCUTANEOUS at 09:22

## 2017-09-19 RX ADMIN — ACETAMINOPHEN 650 MG: 325 TABLET ORAL at 01:25

## 2017-09-19 RX ADMIN — HYDROCODONE BITARTRATE AND ACETAMINOPHEN 2 TABLET: 7.5; 325 TABLET ORAL at 16:36

## 2017-09-19 RX ADMIN — IBUPROFEN 600 MG: 600 TABLET ORAL at 15:31

## 2017-09-19 RX ADMIN — ATORVASTATIN CALCIUM 80 MG: 40 TABLET, FILM COATED ORAL at 09:25

## 2017-09-19 RX ADMIN — CARVEDILOL 12.5 MG: 3.12 TABLET, FILM COATED ORAL at 18:19

## 2017-09-19 RX ADMIN — HYDROCODONE BITARTRATE AND ACETAMINOPHEN 2 TABLET: 7.5; 325 TABLET ORAL at 10:42

## 2017-09-19 ASSESSMENT — VISUAL ACUITY
OU: NORMAL ACUITY

## 2017-09-19 NOTE — PROGRESS NOTES
CLINICAL NUTRITION SERVICES - REASSESSMENT NOTE     Nutrition Prescription    RECOMMENDATIONS FOR MDs/PROVIDERS TO ORDER:  - Bowel regimen? Unable to confirm recent BM.     Malnutrition Status:    - Severe malnutrition in the context of chronic illness    Recommendations already ordered by Registered Dietitian (RD):  - none additional at this time.     Future/Additional Recommendations:  - ongoing PO adequacy vs add supps?  - GI status      EVALUATION OF THE PROGRESS TOWARD GOALS   Diet:   Intake: Calorie counts:  9/16     Kcals: 2087   Protein: 74g   9/17     Kcals: 2522   Protein: 89g  9/18     Kcals: 1888   Protein: 65g  ------------------------------------------------------------------  Three day averages = 2166 kcals (39 kcals/kg) and 76 gm PRO (1.4 gm PRO/kg) per dosing weight. Pt meeting > 100% estimated needs.      NEW FINDINGS   Nutrition/GI: pt is tolerating diet; per calorie counts, meeting > 100% kcal/PRO needs. Unable to confirm recent BM; none charted in some time.     Weights: trending down from admit: 122 lbs --> 111 lbs = 9% weight loss over 1 week. Cannot r/o role of fluid in dramatic, short-term weight loss. Pt is low-normal for BMI status.     Labs: BUN: 37 (H) and creatinine: 1.77 (H)    MALNUTRITION  % Intake: No decreased intake noted  % Weight Loss: > 2% in 1 week (severe); fluid balance is likely at least partially responsible.   Subcutaneous Fat Loss: Facial region, Upper arm, Lower arm, and Thoracic/intercostal:  Mild/moderate   Muscle Loss: Facial & jaw region, Upper arm (bicep, tricep) and Lower arm  (forearm):  Moderate vs sarcopenia  Fluid Accumulation/Edema: None noted  Malnutrition Diagnosis: Severe malnutrition in the context of chronic illness    Previous Goals   Patient to consume % of nutritionally adequate meal trays TID, or the equivalent with supplements/snacks.  Evaluation: Met    Previous Nutrition Diagnosis  Inadequate oral intake related to extreme dislike of  inpatient food as evidenced by pt report    Evaluation: Resolved    CURRENT NUTRITION DIAGNOSIS  Predicted inadequate nutrient intake related to prior reports of dislike of menu and potential for decreased appetite/intake with LOS.       INTERVENTIONS  Implementation  No interventions at this time aside from ongoing monitoring of intakes/adequacy of intake and GI status as indicated.     Goals  Patient to consume % of nutritionally adequate meal trays TID, or the equivalent with supplements/snacks.    Monitoring/Evaluation  Progress toward goals will be monitored and evaluated per protocol.     Renea Wu RD, LD  Neuro ICU  Pager: [phone]

## 2017-09-19 NOTE — PROGRESS NOTES
Calorie Counts  Intake recorded for: 9/18  Kcals: 1888  Protein: 65g  # Meals Recorded: 3 meals (First - 100% apple juice, 2 steel strips, hash browns, blueberry muffin)      (Second - 100% hamburger, 2 steel strips, 2 orders of hash browns)      (Third - 100% steel strip, hash browns, alice food cake, ice cream, 50% cod)  # Supplements Recorded: 0

## 2017-09-19 NOTE — PLAN OF CARE
Problem: Goal Outcome Summary  Goal: Goal Outcome Summary  Outcome: No Change  Pt sleeping well overnight, VSS, neuros intact and unchanged except for headache and expressive aphasia. Pt oriented x3-4, but difficult to assess d/t aphasia. Scheduled Tylenol and Norco given for headache. Charge nurse notified to ask MD about changing Tylenol to prn. Steady on feet, up to BR to void with SBA. No IV access, MD aware. Tolerating regular diet, on calorie count.  Plan: continue with POC

## 2017-09-19 NOTE — PLAN OF CARE
Problem: Goal Outcome Summary  Goal: Goal Outcome Summary  SLP: Pt seen bedside for brief language intervention; pt with plans to discharge this date.  Pt continues to exhibit moderate to severely impaired language but demonstrating increased ability to communicate basic wants/needs with augmentation and listener effort.  Pt significantly frustrated and angered by current level of skills.  Recommend continue ST for basic language intervention; anticipate on OP basis given discharge to friend's home.

## 2017-09-19 NOTE — PROGRESS NOTES
"Social Work: Assessment with Discharge Plan    Patient Name:  Sowmya Gibbs  :  1945  Age:  71 year old  MRN:  [id number]  Risk/Complexity Score:  Filed Complexity Screen Score: 12  Completed assessment with:  Patient    Presenting Information   Reason for Referral:  Assessment and Discharge Planning  Date of Intake:  2017  Referral Source:  Case Finding  Decision Maker:  Pt is legally her own decision maker  Alternate Decision Maker:  Pt refused to state whom she would want to make medical decisions for her if she could not make decisions for herself.  Health Care Directive:  Provided education.  Pt was not open to discussion in regards to HCD.  Pt repeated \"no, no, no.\"  Living Situation:  Pt states that she came to [place] 1-2 weeks ago and has been staying with long time friend Kasi, in his [place], [place] home.  Prior to coming to [place], pt states that she was living in [place] for 2 years and prior to that, she had been in [place], and prior to that, pt reports that she was in [place].   Per pt, Kasi is retired and she has known him 40 - 60 years.    Previous Functional Status:  Prior to hospitalization, pt was indep with all  Mobility (no AD, no falls in the past year), adl's, with financial mgnt and with medication administration.  Pt does not drive. Pt states that she uses public transportation to meet her transportation needs.  Pt does not own any DME. Pt was not utilizing any community resources.    Patient and family understanding of hospitalization:  Pt states that she had a stroke.  Cultural/Language/Spiritual Considerations:  Taoist  Adjustment to Illness: Pt voices anger that she remains hospitalized and wants to be discharged.    Physical Health  Reason for Admission:    1. Cerebrovascular accident (CVA), unspecified mechanism (H)    2. Aphasia    3. Essential hypertension, malignant    4. Personal history of transient cerebral ischemia    5. Nonintractable migraine, unspecified " "migraine type      Services Needed/Recommended:  Functionally pt is reportedly indep.  Cognitively, OT would recommend TCU  Placement.    Mental Health/Chemical Dependency  Diagnosis:  History of Narcotic Abuse  Support/Services in Place:  None  Services Needed/Recommended:  None at this time. Abstain from abuse of narcotics.    Support System  Significant relationship at present time:  Friend, Kasi  Family of origin is available for support:  Friend, Kasi  Other support available:  Pt tells SW that she is a  and has 3 adult children.  Pt states that her daughter Celina lives in [place] and they maintain limited contact.  Pt would not state that the names of her other children or their locations and states that she does not maintain contact with them.    Gaps in support system:  Limited support, however, it appears that this may be per pt choice.  Patient is caregiver to:  None     Provider Information   Primary Care Physician:  Chaka Knott   [phone]   Clinic:  [address] [place] [address] [address]VE [place] / [facility] [address]      :  None    Financial   Income Source:  Social  Security and Pension  Financial Concerns:  None identified  Insurance:    Payor/Plan Subscriber Name Rel Member # Group #   MEDICARE - MEDICARE JOLLY EDWARDS  [id number]       ATTN CLAIMS, [address]       Discharge Plan   Patient and family discharge goal:  Discharge to friend  Kasi's home  Provided education on discharge plan:  YES  Patient agreeable to discharge plan:  NO, pt is adamant that she will not accept any form of placement.    A list of Medicare Certified Facilities was provided to the patient and/or family to encourage patient choice. Patient's choices for facility are:  None  Will NH provide Skilled rehabilitation or complex medical: None provided as pt will not accept placement.  In regards to suggestion of placement pt states, \"this is so stupid, Alfonso Berger, I'm an adult.\"  General information regarding anticipated " insurance coverage and possible out of pocket cost was discussed. Patient and patient's family are aware patient may incur the cost of transportation to the facility, pending insurance payment: no  Barriers to discharge:  Pt declines placement, thus, 24 hour supervision is recommended., pt has given SW verbal permission to phone her friend Don to discuss whether or not he can provide 24 hour supervision.    Discharge Recommendations   Anticipated Disposition:  To be determined.      Additional comments       SW will follow.    BRITTANI Ferrer  Social Work, 6A  Phone:  [phone]  Pager:  [phone]  9/21/2017

## 2017-09-19 NOTE — PLAN OF CARE
Problem: Goal Outcome Summary  Goal: Goal Outcome Summary  Outcome: Improving  Pt on 6A s/p ischemic stroke. A&Ox4. Moderate to severe expressive aphasia. Calm and cooperative with cares. Calling appropriately. VSS, exp hr alex. Difficult to assess orientation d/t aphasia. Norco given q6h for HA. Tolerating regular diet with allison counts. Pt ate well at dinner tonight. Assist with ordering meals. Voiding spontaneously. Pt up independently. Door alarm on. No IV, okay'd by MD but no orders. Continue plan of care.

## 2017-09-19 NOTE — PROGRESS NOTES
[facility], [place]   Neurology Daily Note  Sowmya Gibbs  [id number]  09/19/2017    Subjective: Patient sitting up and eating breakfast. Otherwise no complaints.    Objective:   /66 (BP Location: Right arm)  Pulse 60  Temp 98.2  F (36.8  C) (Oral)  Resp 16  Wt 50.6 kg (111 lb 8 oz)  LMP 01/25/2003  SpO2 95%  BMI 18.55 kg/m2  General: Awake and alert, not in any acute distress, cooperative  HEENT: Atraumatic, normocephalic, no scleral icterus or conjunctival pallor   Cardiac: RRR, S1, S2 no S3/S4, no murmurs  Chest: Clear to auscultation bilaterally, no wheezes, rubs or crepitations  Abdomen: Soft, non-tender, non-distended  Extremities: No LE swelling.  Skin: No rash or lesion.   Neuro:  Mental status: Awake, alert, attentive. Speech remains aphasic, able to follow simple commands. Difficulty with more complex commands.  Cranial nerves: CN2-12 tested and no significant findings appreciated. Eyes conjugate, PERRLA, EOMI, visual fields full, face symmetric, facial sensation intact, shoulder shrug strong, palate rise symmetric, tongue/uvula midline, hearing intact to conversation.  Motor: Tone normal. 5/5 strength in all 4 extremities. No atrophy or twitches. No pronator drift.  Reflexes: 2+ reflexic and symmetric biceps, brachioradialis, patellar, and achilles. No crossed adductors or spread. Toes down-going.  Sensory: Intact to LT  x 4 extremities  Coordination: FNF intact bilaterally, no dysmetria.    Lab Investigations:   Hemoglobin A1C   Date Value Ref Range Status   09/11/2017 5.6 4.3 - 6.0 % Final   09/04/2015 5.3 4.3 - 6.0 % Final   03/20/2015 5.7 4.3 - 6.0 % Final   08/06/2014 6.2 (H) 4.3 - 6.0 % Final   11/06/2007 5.4 4.3 - 6.0 % Final     Lab Results   Component Value Date    LDL 88 09/11/2017     Imaging:  None new    Assessment and Plan   Sowmya Gibbs is a 71 year old year old right handed female with multiple stroke risk factors including HTN, DMII, HLD,  previous strokes and a history of RCC s/p laparoscopic partial nephrectomy in 204, CKD who presented on 9/10/2017 with an acute onset aphasia found to have anterior and middle left temporal lobe acute infarcts.       #anterior and middle left temporal lobe acute infarcts  Patient with a significant atheroma in the aortic arch and descending thoracic aorta. 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 Patient was reviewed by CTVS with impression that mobile mass is not likely a source of emboli with her stroke presentation.   - Patient will need dual antiplatelet course of 3 months at discharge with implantable cardiac event monitor placed.   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 Not likely source of emboli for her current stroke.  - Recommend follow-up with cardiology for periodic monitoring of MR       #prominent mediastinal lymph notes  - Discussed with radiology on 9/13, likely reactive as patient also has centrilobular emphysema  - For follow up CT as outpatient within 3-6 months      #10mm lesion along the superior pole of the right kidney  - Likely residual from prior intervention after discussion with radiology  - For follow up CT as outpatient within 3-6 months     #headaches  - On scheduled acetaminophen  - On magnesium supplements  - Ibuprofen PRN     Chronic issues:  #HTN  - Continue carvidelol 12.5mg (to withhold if HR <60)  - Labetalol 10mg PRN      #HLD  - Lipid profile: HDL 28 (L),  (H), LDL 88  - Atorvastatin 80      #DMII  - Continue consistent carbs and glucose checks      #Hypothyroidism  - Continue L-thyroxine 88mcg      FEN: Tolerating  PO  PPx: Heparin 5,000 U  Code: FULL  Dispo: OT recs full supervision versus TCU. Most likely will go to       Patient was seen and discussed with Dr. Renae Bergers  Neurocritical care fellow   Pager [phone]

## 2017-09-19 NOTE — PLAN OF CARE
Problem: Goal Outcome Summary  Goal: Goal Outcome Summary  Outcome: No Change  VSS/A.  Neuros intact except has unchanged expressive aphasia and headache.  A&Ox4.  Communication and assessments are difficult at times d/t expressive aphasia.  Norco PRN for headache, with some relief.  Up ad santiago with SBA, steady on feet.  Voiding spont.  No IV access.  Hailey regular diet well, on calorie counts.  Stroke book teachback done (1-3).  Plan: Discharge to home today.  Please call Kasi (significant other) [phone] when ready to discharge.  He is about 30 minutes from hospital and will pick her up when she is ready, wants her ready by the front door.  DaughterCelina, was notified by writer.      1420: Kasi (significant other), was called to come pick patient up.  He was asked by writer to come up to the unit and review discharge orders with patient and writer, d/t the patient's communication barrier and to ensure that he also understands her discharge instructions and follow up plan.  Kasi stated that he cannot come up to the unit and he also stated that he is leaving to go out of town tomorrow, and will leave the patient alone.  Charge nurse was notified of this, and writer as well as the charge nurse feel that the patient is not safe to discharge today, because she cannot be left alone that soon after her discharge.  MD is being notified at this time.  DaughterCelina, has been contacted via phone by writer of the current plan of stopping this discharge.

## 2017-09-20 LAB
GLUCOSE BLDC GLUCOMTR-MCNC: 149 MG/DL (ref 70–99)
GLUCOSE BLDC GLUCOMTR-MCNC: 156 MG/DL (ref 70–99)
GLUCOSE BLDC GLUCOMTR-MCNC: 93 MG/DL (ref 70–99)
GLUCOSE BLDC GLUCOMTR-MCNC: 96 MG/DL (ref 70–99)

## 2017-09-20 PROCEDURE — 25000132 ZZH RX MED GY IP 250 OP 250 PS 637: Mod: GY | Performed by: STUDENT IN AN ORGANIZED HEALTH CARE EDUCATION/TRAINING PROGRAM

## 2017-09-20 PROCEDURE — 25000128 H RX IP 250 OP 636: Performed by: PSYCHIATRY & NEUROLOGY

## 2017-09-20 PROCEDURE — 25000132 ZZH RX MED GY IP 250 OP 250 PS 637: Mod: GY | Performed by: PSYCHIATRY & NEUROLOGY

## 2017-09-20 PROCEDURE — A9270 NON-COVERED ITEM OR SERVICE: HCPCS | Mod: GY | Performed by: STUDENT IN AN ORGANIZED HEALTH CARE EDUCATION/TRAINING PROGRAM

## 2017-09-20 PROCEDURE — 97530 THERAPEUTIC ACTIVITIES: CPT | Mod: GO

## 2017-09-20 PROCEDURE — 40000133 ZZH STATISTIC OT WARD VISIT

## 2017-09-20 PROCEDURE — A9270 NON-COVERED ITEM OR SERVICE: HCPCS | Mod: GY | Performed by: PSYCHIATRY & NEUROLOGY

## 2017-09-20 PROCEDURE — 00000146 ZZHCL STATISTIC GLUCOSE BY METER IP

## 2017-09-20 PROCEDURE — 12000008 ZZH R&B INTERMEDIATE UMMC

## 2017-09-20 RX ADMIN — IBUPROFEN 600 MG: 600 TABLET ORAL at 17:01

## 2017-09-20 RX ADMIN — HYDROCODONE BITARTRATE AND ACETAMINOPHEN 2 TABLET: 7.5; 325 TABLET ORAL at 04:50

## 2017-09-20 RX ADMIN — HEPARIN SODIUM 5000 UNITS: 5000 INJECTION, SOLUTION INTRAVENOUS; SUBCUTANEOUS at 09:07

## 2017-09-20 RX ADMIN — HYDROCODONE BITARTRATE AND ACETAMINOPHEN 2 TABLET: 7.5; 325 TABLET ORAL at 19:08

## 2017-09-20 RX ADMIN — ASPIRIN 81 MG: 81 TABLET, COATED ORAL at 08:57

## 2017-09-20 RX ADMIN — LEVOTHYROXINE SODIUM 88 MCG: 88 TABLET ORAL at 08:56

## 2017-09-20 RX ADMIN — HYDROCODONE BITARTRATE AND ACETAMINOPHEN 2 TABLET: 7.5; 325 TABLET ORAL at 13:14

## 2017-09-20 RX ADMIN — HEPARIN SODIUM 5000 UNITS: 5000 INJECTION, SOLUTION INTRAVENOUS; SUBCUTANEOUS at 22:12

## 2017-09-20 RX ADMIN — Medication 2.5 MG: at 22:17

## 2017-09-20 RX ADMIN — ATORVASTATIN CALCIUM 80 MG: 40 TABLET, FILM COATED ORAL at 08:57

## 2017-09-20 RX ADMIN — CLOPIDOGREL 75 MG: 75 TABLET, FILM COATED ORAL at 08:56

## 2017-09-20 RX ADMIN — IBUPROFEN 600 MG: 600 TABLET ORAL at 08:56

## 2017-09-20 RX ADMIN — Medication 500 MG: at 08:56

## 2017-09-20 ASSESSMENT — VISUAL ACUITY
OU: NORMAL ACUITY

## 2017-09-20 NOTE — PLAN OF CARE
Problem: Goal Outcome Summary  Goal: Goal Outcome Summary  Outcome: No Change  Pt on 6A s/p ischemic stroke. VSS. A&O x4. Neuro unchanged: Expressive aphasia; pt has difficulty with assessment d/t aphasia. Pt c/o HA controlled with PRN Norco and Ibuprofen. Voiding spontaneously. Regular diet; fair intake. PIV removed. Up independently; steady on feet. Continue to monitor and follow current POC.

## 2017-09-20 NOTE — PROGRESS NOTES
Social Work Services Progress Note    Hospital Day: 11  Date of Initial Social Work Evaluation:  9/19/17  Collaborated with:  Pt's friendKasi    Data:  Pt is awaiting discharge.  Pt declines SNF placement.  Thus, 24 hour supervision is recommended.      Intervention:  CARLOS phoned pt's friend Kasi, on 9/19/17.  Kasi states that pt has been in [place] (staying with him) since 8/12/17.  Don confirms that pt was in [place] for 2 years prior to coming to [place].  Kasi indicates that he has known pt for 50+ years.  Kasi states that he is now aware of extent of involvement pt has with her children.  Kasi states that pt can return to his home, live with him indefinitely and that he will provide 24 hour supervision.  Kasi indicates that he cannot begin providing this service until 9/21/17 and he states that he will pick pt up at 4pm.    CARLOS spoke with Kasi again on 9/20/17 to inform him that OT (rAabella) will try to meet with him (per OT's request) at 4pm on 9/21/17 to provide information further information on pt's deficits and recommendations.  CARLOS informed OT (Arabella) that Kasi plans to arrive at 4pm on 9/21/17.  CARLOS updated Neurology.    Assessment: Pt declines any form of placement.  Friend Kasi states that pt can return to his home, live with him indefinitely and that he will provide 24 hour supervision.    Plan:    Anticipated Disposition:  To friend Kasi's home on 9/21/17 at 4pm.    Barriers to d/c plan:  Friend Kasi is not available to begin providing 24 hour supervision until 9/21/17.    Follow Up:  SW available should add'l needs arise.    BRITTANI Ferrer  Social Work, 6A  Phone:  [phone]  Pager:  [phone]  9/21/2017

## 2017-09-20 NOTE — PROGRESS NOTES
[facility], [place]   Neurology Daily Note  Sowmya Gibbs  [id number]  09/20/2017    Subjective: No complaints today. Requesting to be discharged.    Objective:   /57 (BP Location: Left arm)  Pulse 80  Temp 97.2  F (36.2  C) (Oral)  Resp 16  Wt 50.6 kg (111 lb 8 oz)  LMP 01/25/2003  SpO2 97%  BMI 18.55 kg/m2  General: Awake and alert, not in any acute distress, cooperative  HEENT: Atraumatic, normocephalic, no scleral icterus or conjunctival pallor   Cardiac: RRR, S1, S2 no S3/S4, no murmurs  Chest: Clear to auscultation bilaterally, no wheezes, rubs or crepitations  Abdomen: Soft, non-tender, non-distended  Extremities: No LE swelling.  Skin: No rash or lesion.   Psych: Normal mood and affect  Neuro:  Mental status: Awake, alert, attentive. Speech remains aphasic, able to follow simple commands. Difficulty with more complex commands.  Cranial nerves: CN2-12 tested and no significant findings appreciated. Eyes conjugate, PERRLA, EOMI, visual fields full, face symmetric, facial sensation intact, shoulder shrug strong, palate rise symmetric, tongue/uvula midline, hearing intact to conversation.  Motor: Tone normal. 5/5 strength in all 4 extremities. No atrophy or twitches. No pronator drift.  Reflexes: 2+ reflexic and symmetric biceps, brachioradialis, patellar, and achilles. No crossed adductors or spread. Toes down-going.  Sensory: Intact to LT  x 4 extremities  Coordination: FNF intact bilaterally, no dysmetria.    Lab Investigations:   Hemoglobin A1C   Date Value Ref Range Status   09/11/2017 5.6 4.3 - 6.0 % Final   09/04/2015 5.3 4.3 - 6.0 % Final   03/20/2015 5.7 4.3 - 6.0 % Final   08/06/2014 6.2 (H) 4.3 - 6.0 % Final   11/06/2007 5.4 4.3 - 6.0 % Final     Lab Results   Component Value Date    LDL 88 09/11/2017       Imaging:  No results found for this or any previous visit (from the past 24 hour(s)).    Assessment and Plan   Sowmya Gibbs is a 71 year old year  old right handed female with multiple stroke risk factors including HTN, DMII, HLD, previous strokes and a history of RCC s/p laparoscopic partial nephrectomy in 204, CKD who presented on 9/10/2017 with an acute onset aphasia found to have anterior and middle left temporal lobe acute infarcts.       #anterior and middle left temporal lobe acute infarcts  Patient with a significant atheroma in the aortic arch and descending thoracic aorta. 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 Patient was reviewed by CTVS with impression that mobile mass is not likely a source of emboli with her stroke presentation.   - Patient will need dual antiplatelet course of 3 months at discharge with implantable cardiac event monitor placed.   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 Not likely source of emboli for her current stroke.  - Recommend follow-up with cardiology for periodic monitoring of MR       #prominent mediastinal lymph notes  - Discussed with radiology on 9/13, likely reactive as patient also has centrilobular emphysema  - For follow up CT as outpatient within 3-6 months      #10mm lesion along the superior pole of the right kidney  - Likely residual from prior intervention after discussion with radiology  - For follow up CT as outpatient within 3-6 months      #headaches  - On scheduled acetaminophen  - On magnesium supplements  - Ibuprofen PRN      Chronic issues:  #HTN  - Continue carvidelol 12.5mg (to withhold if HR <60)  - Labetalol 10mg PRN      #HLD  - Lipid profile: HDL 28 (L),  (H), LDL 88  - Atorvastatin 80      #DMII  - Continue consistent carbs and glucose checks      #Hypothyroidism  - Continue  L-thyroxine 88mcg      FEN: Tolerating PO  PPx: Heparin 5,000 U  Code: FULL  Dispo: Discharge tomorrow after Social Work contacted friend Don, willing to in patient with 24 hour supervision at discharge.      Patient was seen and discussed with JAYLEN Rosas [facility] SHARONA, MSc(Res)  Neurology Resident, PGY-1  Pager: [phone]

## 2017-09-20 NOTE — PLAN OF CARE
Problem: Goal Outcome Summary  Goal: Goal Outcome Summary  Outcome: Improving  VSS, HR 52; coreg held per orders. A&O x4. Expressive aphasia; pt frustrated at times when not able to verbally communicate thoughts. Reporting headache; lights dimmed in room. PRN norco and PRN ibuprofen x1. Fair appetite, eating about 50-75% of meals. BG checks 93 & 149. Insulin pen ordered from pharmacy, awaiting delivery. Plan for d/c later this week pending safe discharge plan/SO arriving back into town. Continue to monitor.

## 2017-09-21 VITALS
RESPIRATION RATE: 16 BRPM | BODY MASS INDEX: 18.55 KG/M2 | OXYGEN SATURATION: 96 % | HEART RATE: 80 BPM | DIASTOLIC BLOOD PRESSURE: 56 MMHG | SYSTOLIC BLOOD PRESSURE: 100 MMHG | WEIGHT: 111.5 LBS | TEMPERATURE: 96.2 F

## 2017-09-21 LAB
GLUCOSE BLDC GLUCOMTR-MCNC: 123 MG/DL (ref 70–99)
GLUCOSE BLDC GLUCOMTR-MCNC: 90 MG/DL (ref 70–99)

## 2017-09-21 PROCEDURE — 25000132 ZZH RX MED GY IP 250 OP 250 PS 637: Mod: GY | Performed by: PSYCHIATRY & NEUROLOGY

## 2017-09-21 PROCEDURE — A9270 NON-COVERED ITEM OR SERVICE: HCPCS | Mod: GY | Performed by: STUDENT IN AN ORGANIZED HEALTH CARE EDUCATION/TRAINING PROGRAM

## 2017-09-21 PROCEDURE — 25000132 ZZH RX MED GY IP 250 OP 250 PS 637: Mod: GY | Performed by: STUDENT IN AN ORGANIZED HEALTH CARE EDUCATION/TRAINING PROGRAM

## 2017-09-21 PROCEDURE — 00000146 ZZHCL STATISTIC GLUCOSE BY METER IP

## 2017-09-21 PROCEDURE — A9270 NON-COVERED ITEM OR SERVICE: HCPCS | Mod: GY | Performed by: PSYCHIATRY & NEUROLOGY

## 2017-09-21 PROCEDURE — 25000128 H RX IP 250 OP 636: Performed by: PHYSICAL MEDICINE & REHABILITATION

## 2017-09-21 PROCEDURE — 25000128 H RX IP 250 OP 636: Performed by: PSYCHIATRY & NEUROLOGY

## 2017-09-21 PROCEDURE — 90662 IIV NO PRSV INCREASED AG IM: CPT | Performed by: PHYSICAL MEDICINE & REHABILITATION

## 2017-09-21 RX ORDER — CLOPIDOGREL BISULFATE 75 MG/1
75 TABLET ORAL DAILY
Qty: 30 TABLET | Refills: 3 | Status: SHIPPED | OUTPATIENT
Start: 2017-09-21 | End: 2017-09-23

## 2017-09-21 RX ORDER — ATORVASTATIN CALCIUM 80 MG/1
80 TABLET, FILM COATED ORAL DAILY
Qty: 30 TABLET | Refills: 11 | Status: SHIPPED | OUTPATIENT
Start: 2017-09-21 | End: 2017-09-23

## 2017-09-21 RX ORDER — IBUPROFEN 600 MG/1
600 TABLET, FILM COATED ORAL EVERY 6 HOURS PRN
Qty: 120 TABLET | Refills: 0 | Status: SHIPPED | OUTPATIENT
Start: 2017-09-21 | End: 2017-09-23

## 2017-09-21 RX ADMIN — CLOPIDOGREL 75 MG: 75 TABLET, FILM COATED ORAL at 08:09

## 2017-09-21 RX ADMIN — HEPARIN SODIUM 5000 UNITS: 5000 INJECTION, SOLUTION INTRAVENOUS; SUBCUTANEOUS at 10:22

## 2017-09-21 RX ADMIN — HYDROCODONE BITARTRATE AND ACETAMINOPHEN 2 TABLET: 7.5; 325 TABLET ORAL at 00:42

## 2017-09-21 RX ADMIN — HYDROCODONE BITARTRATE AND ACETAMINOPHEN 2 TABLET: 7.5; 325 TABLET ORAL at 12:33

## 2017-09-21 RX ADMIN — INFLUENZA A VIRUSA/MICHIGAN/45/2015 X-275 (H1N1) ANTIGEN (FORMALDEHYDE INACTIVATED), INFLUENZA A VIRUS A/HONG KONG/4801/2014 X-263B (H3N2) ANTIGEN (FORMALDEHYDE INACTIVATED), AND INFLUENZA B VIRUS B/BRISBANE/60/2008 ANTIGEN (FORMALDEHYDE INACTIVATED) 0.5 ML: 60; 60; 60 INJECTION, SUSPENSION INTRAMUSCULAR at 10:22

## 2017-09-21 RX ADMIN — LEVOTHYROXINE SODIUM 88 MCG: 88 TABLET ORAL at 08:09

## 2017-09-21 RX ADMIN — HYDROCODONE BITARTRATE AND ACETAMINOPHEN 2 TABLET: 7.5; 325 TABLET ORAL at 06:35

## 2017-09-21 RX ADMIN — ASPIRIN 81 MG: 81 TABLET, COATED ORAL at 08:09

## 2017-09-21 RX ADMIN — ATORVASTATIN CALCIUM 80 MG: 40 TABLET, FILM COATED ORAL at 08:14

## 2017-09-21 RX ADMIN — Medication 500 MG: at 08:09

## 2017-09-21 ASSESSMENT — VISUAL ACUITY
OU: NORMAL ACUITY
OU: NORMAL ACUITY

## 2017-09-21 NOTE — PROGRESS NOTES
Pt friend (aKsi) came to  pt around 1630; discharge paperwork went over with pt/friend, all questions answered. Belongings returned from security and pt left with all things. Pt retrieved discharge meds from pharmacy prior to leaving and left via car with friend (Kasi); pt refused wheelchair to front door. OT saw pt/friend prior to d/c to explain cares at home.

## 2017-09-21 NOTE — PLAN OF CARE
Problem: Goal Outcome Summary  Goal: Goal Outcome Summary  Outcome: Improving  VSS ex can be bradycardic in mid to high 50's. Expressive aphasia continues- pt reports is improving. No PIV. PRN Norco q6h for head ace. Up independently in room; door alarm in place. Voiding spont. Calls appropriately. Pt will d/c home today at 4pm with friend Don- after OT meets with both of them. Continue with POC.

## 2017-09-21 NOTE — PLAN OF CARE
Problem: Goal Outcome Summary  Goal: Goal Outcome Summary  Outcome: No Change  Pt on 6A s/p ischemic stroke. VSS. A&O x4. Neuro unchanged: Expressive aphasia; pt has difficulty with assessment d/t aphasia. Pt c/o HA controlled with PRN Norco and Ibuprofen. Voiding spontaneously. Regular diet; good intake. No IV. Up independently; steady on feet. Likely D/C tomorrow with friend Don. Continue to monitor and follow current POC.

## 2017-09-21 NOTE — PLAN OF CARE
Problem: Goal Outcome Summary  Goal: Goal Outcome Summary  Outcome: No Change  VSS ex can be bradycardic in mid to high 50's. Expressive aphasia continues- pt reports is improving. No PIV. PRN Norco q6h for head pain. Up independently in room; door alarm in place. Voiding spont. Calls appropriately. Unsure of discharge plans- MD notes say pt will d/c home today with friend Don- last SW/CC note 9/17. Continue with POC.

## 2017-09-22 ENCOUNTER — CARE COORDINATION (OUTPATIENT)
Dept: CARE COORDINATION | Facility: CLINIC | Age: 72
End: 2017-09-22

## 2017-09-22 ENCOUNTER — TELEPHONE (OUTPATIENT)
Dept: FAMILY MEDICINE | Facility: CLINIC | Age: 72
End: 2017-09-22

## 2017-09-22 NOTE — PLAN OF CARE
Problem: Goal Outcome Summary  Goal: Goal Outcome Summary  Speech Language Therapy Discharge Summary     Reason for therapy discharge:    Discharged to home with outpatient therapy.     Progress towards therapy goal(s). See goals on Care Plan in [facility] electronic health record for goal details.  Goals not met.  Barriers to achieving goals:   discharge from facility.     Therapy recommendation(s):    Continued therapy is recommended.  Rationale/Recommendations:  Pt is d/c'd to home with friend- pt continues to demonstrate moderate to severe expressive receptive aphasia and has difficulty communicating her wants/needs. Recommend continue language tx as an OP.

## 2017-09-22 NOTE — PROGRESS NOTES
"[facility]  \"Hello, my name is Florina Duque , and I am calling from the [facility].  I want to check in and see how you are doing, after leaving the hospital.  You may also receive a call from your Care Coordinator (care team), but I want to make sure you don t have any urgent needs.  I have a couple questions to review with you:     Post-Discharge Outreach                                                    Sowmya Gibbs is a 71 year old female        Follow-up Appointments           Adult [facility] Follow-up and recommended labs and tests         Follow up with primary care provider, OCHOA HAN, within 7 days to evaluate medication change.  No follow up labs or test are needed.       Appointments on [place] and/or [facility] (with [facility] or [facility] provider or service). Call [phone] if you haven't heard regarding these appointments within 7 days of discharge.               Follow Up and recommended labs and tests         Please contact Stroke Clinic- Neurology Clinic at [phone], pick option #1,  if you have not been contacted to schedule a stroke follow up appointment in 5 days of discharge.  If you have other stroke related questions, please call [phone], pick option #3, and ask to speak to Jian Wong RN Stroke/Endovascular Care Coordinator.  If you have any urgent stroke related questions after hours, please contact the hospital  at [phone] and ask to be connected to a stroke provider.                        Your next 10 appointments already scheduled      Feb 13, 2018 11:20 AM CST   (Arrive by 11:05 AM)   New Stroke with Hoa Damico MD   [facility] ([facility])     [address] [place] [id number] [phone]                      Care Team:    Patient Care Team       Relationship Specialty Notifications Start End    Ochoa Han MD PCP - " General Family Practice  1/25/13     Phone: [phone] Fax: [phone]         [address] [facility] [address]    Hoa Damico MD MD Neurology  9/21/17     Phone: [phone] Fax: [phone]         [address]  [id number] [facility] [address]            Transition of Care Review                                                      Patient was contacted by an RN for post DC follow up so no duplicate post DC follow up call will be made, message was left with return call back info            Marisa Luque RN        ED / Discharge Outreach Protocol     Patient Contact     Attempt # 1     Was call answered?  No.  Left message on voicemail with information to call me back.                     Plan                                                      Thanks for your time.  Your Care Coordinator may follow-up within the next couple days.  In the meantime if you have questions, concerns or problems call your care team.        Florina Duque

## 2017-09-22 NOTE — PLAN OF CARE
Problem: Goal Outcome Summary  Goal: Goal Outcome Summary  Discharge Planner OT   Patient plan for discharge: home  Current status: OT providing education on home safety and level of assist for cognitive deficits to patient and friend, Don who will be providing assist.  Barriers to return to prior living situation: Cognition, communication deficits  Recommendations for discharge: home with 24 hour supervision  Rationale for recommendations: Safety       Entered by: Arabella Short 09/22/2017 1:12 PM      Occupational Therapy Discharge Summary     Reason for therapy discharge:    Discharged to home.     Progress towards therapy goal(s). See goals on Care Plan in [facility] electronic health record for goal details.  Goals partially met.  Barriers to achieving goals:   discharge from facility.     Therapy recommendation(s):    No further therapy is recommended.

## 2017-09-23 ENCOUNTER — OFFICE VISIT (OUTPATIENT)
Dept: FAMILY MEDICINE | Facility: CLINIC | Age: 72
End: 2017-09-23
Payer: MEDICARE

## 2017-09-23 VITALS
HEART RATE: 64 BPM | BODY MASS INDEX: 19.47 KG/M2 | DIASTOLIC BLOOD PRESSURE: 62 MMHG | TEMPERATURE: 97 F | OXYGEN SATURATION: 97 % | RESPIRATION RATE: 16 BRPM | SYSTOLIC BLOOD PRESSURE: 100 MMHG | WEIGHT: 117 LBS

## 2017-09-23 DIAGNOSIS — G43.009 NONINTRACTABLE MIGRAINE, UNSPECIFIED MIGRAINE TYPE: ICD-10-CM

## 2017-09-23 DIAGNOSIS — F51.04 PSYCHOPHYSIOLOGICAL INSOMNIA: ICD-10-CM

## 2017-09-23 DIAGNOSIS — I10 HYPERTENSION GOAL BP (BLOOD PRESSURE) < 130/80: ICD-10-CM

## 2017-09-23 DIAGNOSIS — I20.89 STABLE ANGINA (H): ICD-10-CM

## 2017-09-23 DIAGNOSIS — I63.9 CEREBROVASCULAR ACCIDENT (CVA), UNSPECIFIED MECHANISM (H): ICD-10-CM

## 2017-09-23 DIAGNOSIS — E11.21 TYPE 2 DIABETES MELLITUS WITH DIABETIC NEPHROPATHY, UNSPECIFIED LONG TERM INSULIN USE STATUS: ICD-10-CM

## 2017-09-23 DIAGNOSIS — R80.9 CONTROLLED TYPE 2 DIABETES MELLITUS WITH MICROALBUMINURIA, WITHOUT LONG-TERM CURRENT USE OF INSULIN (H): Chronic | ICD-10-CM

## 2017-09-23 DIAGNOSIS — M81.8 IDIOPATHIC OSTEOPOROSIS: ICD-10-CM

## 2017-09-23 DIAGNOSIS — I25.10 CORONARY ARTERY DISEASE INVOLVING NATIVE CORONARY ARTERY OF NATIVE HEART WITHOUT ANGINA PECTORIS: ICD-10-CM

## 2017-09-23 DIAGNOSIS — E78.5 HYPERLIPIDEMIA LDL GOAL <70: ICD-10-CM

## 2017-09-23 DIAGNOSIS — R47.01 EXPRESSIVE APHASIA SYNDROME: Primary | ICD-10-CM

## 2017-09-23 DIAGNOSIS — E55.9 VITAMIN D DEFICIENCY: ICD-10-CM

## 2017-09-23 DIAGNOSIS — I25.10 ATHEROSCLEROSIS OF NATIVE CORONARY ARTERY OF NATIVE HEART WITHOUT ANGINA PECTORIS: ICD-10-CM

## 2017-09-23 DIAGNOSIS — E11.29 CONTROLLED TYPE 2 DIABETES MELLITUS WITH MICROALBUMINURIA, WITHOUT LONG-TERM CURRENT USE OF INSULIN (H): Chronic | ICD-10-CM

## 2017-09-23 DIAGNOSIS — I70.0 ATHEROSCLEROSIS OF AORTA (H): ICD-10-CM

## 2017-09-23 DIAGNOSIS — E03.8 OTHER SPECIFIED HYPOTHYROIDISM: ICD-10-CM

## 2017-09-23 PROCEDURE — 99354 ZZC PROLONGED SERV,OFFICE,1ST HR: CPT | Performed by: FAMILY MEDICINE

## 2017-09-23 PROCEDURE — 99214 OFFICE O/P EST MOD 30 MIN: CPT | Performed by: FAMILY MEDICINE

## 2017-09-23 RX ORDER — LEVOTHYROXINE SODIUM 88 UG/1
88 TABLET ORAL DAILY
Qty: 90 TABLET | Refills: 3 | Status: SHIPPED | OUTPATIENT
Start: 2017-09-23

## 2017-09-23 RX ORDER — LEVOTHYROXINE SODIUM 88 UG/1
88 TABLET ORAL
Qty: 30 TABLET | Refills: 11 | Status: SHIPPED | OUTPATIENT
Start: 2017-09-23

## 2017-09-23 RX ORDER — AMPICILLIN TRIHYDRATE 250 MG
2 CAPSULE ORAL 3 TIMES DAILY
Qty: 180 CAPSULE | Refills: 11 | Status: SHIPPED | OUTPATIENT
Start: 2017-09-23

## 2017-09-23 RX ORDER — MULTIVIT-MIN/IRON/FOLIC ACID/K 18-600-40
1 CAPSULE ORAL DAILY
Qty: 90 CAPSULE | Refills: 3 | Status: SHIPPED | OUTPATIENT
Start: 2017-09-23

## 2017-09-23 RX ORDER — CARVEDILOL 12.5 MG/1
12.5 TABLET ORAL 2 TIMES DAILY WITH MEALS
Qty: 60 TABLET | Refills: 11 | Status: SHIPPED | OUTPATIENT
Start: 2017-09-23

## 2017-09-23 RX ORDER — HYDROCODONE BITARTRATE AND ACETAMINOPHEN 7.5; 325 MG/1; MG/1
1 TABLET ORAL EVERY 6 HOURS PRN
Qty: 10 TABLET | Refills: 0 | Status: SHIPPED | OUTPATIENT
Start: 2017-09-23 | End: 2017-09-23

## 2017-09-23 RX ORDER — CARVEDILOL 12.5 MG/1
12.5 TABLET ORAL 2 TIMES DAILY WITH MEALS
Qty: 180 TABLET | Refills: 0 | Status: SHIPPED | OUTPATIENT
Start: 2017-09-23

## 2017-09-23 RX ORDER — CALCIUM CARB/VITAMIN D3/VIT K1 500-500-40
1 TABLET,CHEWABLE ORAL 2 TIMES DAILY
Qty: 180 TABLET | Refills: 3 | Status: SHIPPED | OUTPATIENT
Start: 2017-09-23

## 2017-09-23 RX ORDER — ACETAMINOPHEN 325 MG/1
650 TABLET ORAL EVERY 6 HOURS PRN
Qty: 100 TABLET | Refills: 11 | Status: SHIPPED | OUTPATIENT
Start: 2017-09-23

## 2017-09-23 RX ORDER — ATORVASTATIN CALCIUM 80 MG/1
80 TABLET, FILM COATED ORAL DAILY
Qty: 30 TABLET | Refills: 11 | Status: SHIPPED | OUTPATIENT
Start: 2017-09-23

## 2017-09-23 RX ORDER — CLOPIDOGREL BISULFATE 75 MG/1
75 TABLET ORAL DAILY
Qty: 30 TABLET | Refills: 3 | Status: SHIPPED | OUTPATIENT
Start: 2017-09-23

## 2017-09-23 RX ORDER — ZOLPIDEM TARTRATE 5 MG/1
TABLET ORAL
Qty: 15 TABLET | Refills: 2 | Status: SHIPPED | OUTPATIENT
Start: 2017-09-23

## 2017-09-23 RX ORDER — HYDROCODONE BITARTRATE AND ACETAMINOPHEN 7.5; 325 MG/1; MG/1
1 TABLET ORAL EVERY 6 HOURS PRN
Qty: 15 TABLET | Refills: 0 | Status: SHIPPED | OUTPATIENT
Start: 2017-09-23

## 2017-09-23 NOTE — NURSING NOTE
"Chief Complaint   Patient presents with     Hospital F/U       Initial /62  Pulse 64  Temp 97  F (36.1  C) (Tympanic)  Resp 16  Wt 117 lb (53.1 kg)  LMP 01/25/2003  SpO2 97%  BMI 19.47 kg/m2 Estimated body mass index is 19.47 kg/(m^2) as calculated from the following:    Height as of 2/4/16: 5' 5\" (1.651 m).    Weight as of this encounter: 117 lb (53.1 kg).  Medication Reconciliation: complete   .Monica MURRAY      "

## 2017-09-23 NOTE — PROGRESS NOTES
SUBJECTIVE:   Sowmya Gibbs is a 71 year old female who presents to clinic today for the following health issues:          Hospital Follow-up Visit:    Hospital/Nursing Home/IP Rehab Facility: [facility]  Date of Admission: 9-10-17  Date of Discharge: 9-21-17  Reason(s) for Admission: stroke            Problems taking medications regularly:  None       Medication changes since discharge: None       Problems adhering to non-medication therapy:  None    Summary of hospitalization:  [facility] discharge summary reviewed  Diagnostic Tests/Treatments reviewed.  Follow up needed: none  Other Healthcare Providers Involved in Patient s Care:         None  Update since discharge: improved.     Post Discharge Medication Reconciliation: discharge medications reconciled, continue medications without change.  Plan of care communicated with patient     Coding guidelines for this visit:  Type of Medical   Decision Making Face-to-Face Visit       within 7 Days of discharge Face-to-Face Visit        within 14 days of discharge   Moderate Complexity [id number]   High Complexity [id number]            HISTORY OF HYPOTENSION    HISTORY OF ANEMIA     ATHEROSCEROSIS AORTA    HISTORY OF ANGINA PECTORIS ASYMPTOMATIC AT PRESENT    HISTORY OF TRANSIENT ISCHEMIC ATTACK     RECENT STROKE with EXPRESSIVE APHASIA     CORONARY ARTERY DISEASE     HISTORY OF LEFT RENAL CANCER with REMOVAL    HISTORY OF INTERMITTENT HYPOKALEMIA     ONGOING PSYCHOGENIC INSOMNIA     HYPOTHYROID ACQUIRED with REPLACEMENT    INTERMITTENT MIGRAINE with RARE USAGE OF NARCOTIC FOR HEADACHES    HISTORY OF DRUG DEPENDENCE    HISTORY OF LUMBAR DISC DISEASE with RADICULOPATHY     HISTORY OF TOBACCO ABUSE RELAPSE     HISTORY OF DEPRESSION REMISSION     HISTORY OF ROTATOR CUFF SYNDROME     DIABETES 2 GOAL 8% WELL CONTROLLED    HISTORY OF IMPLANTABLE LOOP RECORDER TO FIND CAUSE FOR RECURRENT STROKES          Diabetes Follow-up      Patient is  checking blood sugars: OCCASIONAL     Diabetic concerns: None     Symptoms of hypoglycemia (low blood sugar): none     Paresthesias (numbness or burning in feet) or sores: No     Date of last diabetic eye exam: RECENT     Hyperlipidemia Follow-Up      Rate your low fat/cholesterol diet?: good    Taking statin?  YES LIPATOR 80MG         Other lipid medications/supplements?:  none    Hypertension Follow-up      Outpatient blood pressures are not being checked.    Low Salt Diet: no added salt    Cerebrovascular Follow-up      Patient history: ischemic cerebrovascular incident    Residual symptoms: Difficulty speaking  Worsened or new symptoms since last visit:  YES EXPRESSIVE APHASIA         Daily aspirin use: Yes  Hypertension controlled: Yes  Depression and Anxiety Follow-Up    Status since last visit: No change    Other associated symptoms:None    Complicating factors:     Significant life event: No     Current substance abuse: None    PHQ-9 SCORE 5/15/2015 11/3/2015 9/23/2017   Total Score 0 - -   Total Score MyChart - - Incomplete   Total Score - 0 -     ZULMA-7 SCORE 11/3/2015   Total Score 0       PHQ-9  English  PHQ-9   Any Language  GAD7    Chronic Pain Follow-Up       Type / Location of Pain: LOWER BACK PAIN with RADICULOPATHY   Analgesia/pain control:       Recent changes:  same      Overall control: Tolerable with discomfort  Activity level/function:      Daily activities:  Able to do light housework, cooking    Work:  not applicable  Adverse effects:  No  Adherance    Taking medication as directed?  Yes    Participating in other treatments: yes  Risk Factors:    Sleep:  Fair    Mood/anxiety:  controlled    Recent family or social stressors:  none noted    Other aggravating factors: none  PHQ-9 SCORE 5/15/2015 11/3/2015 9/23/2017   Total Score 0 - -   Total Score MyChart - - Incomplete   Total Score - 0 -     ZULMA-7 SCORE 11/3/2015   Total Score 0     Encounter-Level CSA:     There are no encounter-level csa.         Back Pain Follow Up      Description:   Location of pain:  center  Character of pain: sharp  Pain radiation: Does not radiate  Since last visit, pain is:  unchanged  New numbness or weakness in legs, not attributed to pain:  no     Intensity: moderate    History:   Pain interferes with job: Not applicable  Therapies tried without relief: NSAIDs  Therapies tried with relief: acetaminophen (Tylenol), cold and opioids             Accompanying Signs & Symptoms:  Risk of Fracture:  None  Risk of Cauda Equina:  None  Risk of Infection:  None  Risk of Cancer:  None                  Problem list and histories reviewed & adjusted, as indicated.  Additional history: as documented        Patient Active Problem List   Diagnosis     Other iatrogenic hypotension     Acute posthemorrhagic anemia     Atherosclerosis of aorta (H)     Other and unspecified angina pectoris     Transient cerebral ischemia     Coronary atherosclerosis of native coronary artery     Malignant neoplasm of urinary organ (H)     Hypopotassemia     Insomnia     Tobacco use disorder     Acquired hypothyroidism     Migraine with aura     Idiopathic osteoporosis     Drug dependence (H)     Intervertebral lumbar disc disorder with myelopathy, lumbar region     Depressive disorder, not elsewhere classified     Menopausal and postmenopausal disorder     Hyperlipidemia LDL goal <70     Hypertension goal BP (blood pressure) < 130/80     H/O: stroke with residual effects     Adjustment disorder with anxiety     Major depression in complete remission (H)     Rotator cuff syndrome     Arthropathy of shoulder region     History of stroke     Type 2 diabetes mellitus, controlled, with renal complications (H)     Chronic pain     Coronary artery disease involving native coronary artery without angina pectoris     Atherosclerosis of native coronary artery of native heart without angina pectoris     Acute ischemic stroke (H)     Implantable loop recorder, Dada  LINQ     Past Surgical History:   Procedure Laterality Date     KIDNEY SURGERY  06/23/2004    cancer, kidney partial removal       Social History   Substance Use Topics     Smoking status: Former Smoker     Types: Cigarettes     Quit date: 2/4/2016     Smokeless tobacco: Never Used      Comment: patient smokes about 3 cigarettes per day     Alcohol use No     Family History   Problem Relation Age of Onset     HEART DISEASE Mother      Parkinsonism Father      HEART DISEASE Father      HEART DISEASE Sister      Family History Negative Other      females heart disease         Current Outpatient Prescriptions   Medication Sig Dispense Refill     clopidogrel (PLAVIX) 75 MG tablet Take 1 tablet (75 mg) by mouth daily 30 tablet 3     aspirin 81 MG EC tablet Take 1 tablet (81 mg) by mouth daily 60 tablet 11     atorvastatin (LIPITOR) 80 MG tablet Take 1 tablet (80 mg) by mouth daily 30 tablet 11     acetaminophen (TYLENOL) 325 MG tablet Take 2 tablets (650 mg) by mouth every 6 hours as needed for mild pain or fever 100 tablet 11     carvedilol (COREG) 12.5 MG tablet Take 1 tablet (12.5 mg) by mouth 2 times daily (with meals) 60 tablet 11     levothyroxine (SYNTHROID/LEVOTHROID) 88 MCG tablet Take 1 tablet (88 mcg) by mouth every morning (before breakfast) 30 tablet 11     zolpidem (AMBIEN) 5 MG tablet TAKE ONE TABLET BY MOUTH ONCE DAILY AT BEDTIME AS NEEDED 15 tablet 2     levothyroxine (SYNTHROID/LEVOTHROID) 88 MCG tablet Take 1 tablet (88 mcg) by mouth daily 90 tablet 3     carvedilol (COREG) 12.5 MG tablet Take 1 tablet (12.5 mg) by mouth 2 times daily (with meals) 180 tablet 0     cinnamon (HM CINNAMON) 500 MG CAPS Take 2 capfuls by mouth 3 times daily 180 capsule 11     Alpha-Lipoic Acid 200 MG TABS Take 1 tablet by mouth 2 times daily 180 tablet 3     Cholecalciferol (VITAMIN D) 2000 UNITS CAPS Take 1 tablet by mouth daily 90 capsule 3     HYDROcodone-acetaminophen (NORCO) 7.5-325 MG per tablet Take 1 tablet by mouth  every 6 hours as needed for moderate to severe pain 15 tablet 0     blood glucose (ZEE MICROLET 2) lancing device Use to test blood sugars TWICE DAILY  times daily or as directed. 300 each 0     ASPIRIN NOT PRESCRIBED, INTENTIONAL, 1 each daily Antiplatelet medication not prescribed intentionally due to Current thienopryridine therapy 0 each 0     SODIUM BICARBONATE PO Take 20 mg by mouth 3 times daily       blood glucose (ZEE CONTOUR) test strip Use to test blood sugars TWICE DAILY  times daily or as directed. 100 strip 11     blood glucose monitoring (ZEE CONTOUR MONITOR) meter device kit Use to test blood sugars TWICE DAILY  times daily or as directed.  MAY HAVE GENERIC CONTROL SOLUTIONS, STRIPS TWICE DAILY AND LANCETS   NEWLY DISCOVERED DIABETES 2 GOAL 8% 250.00  MAY HAVE GENERIC KIT AS WELL 1 kit 0     blood glucose calibration (ZEE CONTOUR) NORMAL solution Use to calibrate blood glucose monitor as directed. 1 Bottle 11     blood glucose monitoring (ONE TOUCH DELICA) lancets Use to test blood sugars TWICE DAILY  times daily or as directed.  GENERIC OK 1 Box 11     nitroglycerin (NITROSTAT) 0.4 MG SL tablet Place 1 tablet (0.4 mg) under the tongue every 5 minutes as needed for chest pain 25 tablet prn     FISH OIL 3 capsules every morning.       Allergies   Allergen Reactions     Ceclor [Cefaclor Monohydrate]      Codeine      Demerol      Oxycodone Itching     Penicillins      Septra [Sulfamethoxazole W-Trimethoprim]      Somatropin      Could not walk or talk     Tramadol GI Disturbance     Ultram     Tricyclic Antidepressants      Hyper crazy     Valium      Recent Labs   Lab Test  09/19/17   0759  09/16/17   0716   09/11/17   0606   10/09/15   1647   09/04/15   1355  03/20/15   1359   03/22/13   1616   A1C   --    --    --   5.6   --    --    --   5.3  5.7   < >   --    LDL   --    --    --   88   --    --    --   122  64   < >  Cannot estimate LDL when triglyceride exceeds 400 mg/dL  88   HDL   --     --    --   28*   --    --    --   33*  36*   < >  31*   TRIG   --    --    --   224*   --    --    --   308*  157*   < >  418*   ALT   --    --    --    --    --    --    --   24  28   --   23   CR  1.77*  1.63*   < >   --    < >   --    < >  2.10*  1.30*   < >  1.50*   GFRESTIMATED  28*  31*   < >   --    < >   --    < >  23*  41*   < >  35*   GFRESTBLACK  34*  38*   < >   --    < >   --    < >  28*  49*   < >  42*   POTASSIUM  4.9  4.8   < >   --    < >   --    < >  4.8  4.0   < >  4.2   TSH   --    --    --    --    --   Mycobiotic Slant   Reviewed: OK with previous  CORRECTED ON 10/12 AT 1605: PREVIOUSLY REPORTED AS <0.02     --   0.16*  1.97   < >  0.56    < > = values in this interval not displayed.      BP Readings from Last 3 Encounters:   09/23/17 100/62   09/21/17 100/56   02/04/16 98/60    Wt Readings from Last 3 Encounters:   09/23/17 117 lb (53.1 kg)   09/17/17 111 lb 8 oz (50.6 kg)   02/04/16 121 lb 9.6 oz (55.2 kg)                  Labs reviewed in EPIC          Reviewed and updated as needed this visit by clinical staffTobacco  Allergies  Meds  Med Hx  Surg Hx  Fam Hx  Soc Hx      Reviewed and updated as needed this visit by Provider         ROS:   C: NEGATIVE for fever, chills, change in weight  I: NEGATIVE for worrisome rashes, moles or lesions  E: NEGATIVE for vision changes or irritation  E/M: NEGATIVE for ear, mouth and throat problems  R: NEGATIVE for significant cough or SOB  B: NEGATIVE for masses, tenderness or discharge  CV: NEGATIVE for chest pain, palpitations or peripheral edema  GI: NEGATIVE for nausea, abdominal pain, heartburn, or change in bowel habits  : NEGATIVE for frequency, dysuria, or hematuria  MUSCULOSKELETAL: LOWER BACK PAIN   N: NEGATIVE for weakness, dizziness or paresthesias  E: NEGATIVE for temperature intolerance, skin/hair changes  H: NEGATIVE for bleeding problems  P: NEGATIVE for changes in mood or affect    OBJECTIVE:     /62  Pulse 64  Temp 97  F  (36.1  C) (Tympanic)  Resp 16  Wt 117 lb (53.1 kg)  LMP 01/25/2003  SpO2 97%  BMI 19.47 kg/m2  Body mass index is 19.47 kg/(m^2).   GENERAL: healthy, alert and no distress  EYES: Eyes grossly normal to inspection, PERRL and conjunctivae and sclerae normal  HENT: ear canals and TM's normal, nose and mouth without ulcers or lesions  NECK: no adenopathy, no asymmetry, masses, or scars and thyroid normal to palpation  RESP: lungs clear to auscultation - no rales, rhonchi or wheezes  CV: regular rate and rhythm, normal S1 S2, no S3 or S4, no murmur, click or rub, no peripheral edema and peripheral pulses strong  ABDOMEN: soft, nontender, no hepatosplenomegaly, no masses and bowel sounds normal  MS: no gross musculoskeletal defects noted, no edema  SKIN: no suspicious lesions or rashes  NEURO: Normal strength and tone, mentation intact and speech normal  PSYCH: mentation appears normal, affect normal/bright    Diagnostic Test Results:  Results for orders placed or performed during the hospital encounter of 09/10/17   CT Head w/o Contrast   Result Value Ref Range    Radiologist flags Acute infarct (Urgent)     Narrative    Head CT without contrast 9/10/2017 8:01 PM    History: Code stroke, expressive aphasia starting this morning    Comparison:  None    Technique:   HEAD CT:  Using multidetector thin collimation helical acquisition  technique, axial, coronal and sagittal CT images from the skull base  to the vertex were obtained without intravenous contrast.     Findings:  Head CT: Loss of gray-white differentiation within the anterior left  temporal lobe (series 3, image 15), and the middle left temporal lobe  (series 3, image 19). Scattered old small infarcts, including the left  insula, left caudate, right cerebellar hemisphere, and medial right  occipital lobe. Age-indeterminate infarct in the left frontal  operculum (series 3, image 18).    No significant mass effect, midline shift, or extra-axial  fluid  collection. Patent basal cisterns. Ventricles are not enlarged. No  intracranial hemorrhage. Intact bony calvarium. Clear mastoid air  cells and paranasal sinuses. Unremarkable orbits. Bilateral vertebral  and internal carotid artery calcifications.      Impression    Impression:   1. Acute infarcts in the anterior and middle left temporal lobe.  Age-indeterminate infarct in the left frontal operculum.  2. Scattered old small infarcts, including within the left insula,  left caudate, right cerebellar hemisphere, and medial right occipital  lobe.     [Urgent Result: Acute infarct]    Finding was identified on 9/10/2017 8:01 PM.     Dr. Orr was contacted by Dr. Alejandro Mares at 9/10/2017 8:09 PM and  verbalized understanding of the urgent finding.     I have personally reviewed the examination and initial interpretation  and I agree with the findings.    TELLO KIRKPATRICK MD   MRA Venogram Brain w/o Cont Angiogram    Narrative    MRV of the head without contrast    History:  Headache with temporal stroke.  Comparison:  9/10/2017.      Technique:   Head MRV: 2D time-of-flight MR venogram (MRV) of the head was  performed without intravenous contrast.       Findings:   Head MRI: Please see separate dictation with stroke protocol regarding  additional intracranial findings.    Head MRV demonstrates no definite thrombosis or stenosis of the major  intracranial dural sinuses or deep cerebral veins.       Impression    Impression:  Head MRV demonstrates patent major dural and deep venous sinuses  intracranially.    I have personally reviewed the examination and initial interpretation  and I agree with the findings.    BILL TORRES MD   MR Brain COW Carotid w/o Contrast    Narrative    MRI brain without contrast  MRA of the head without contrast  Neck MRA without contrast    Provided History:  Stroke.    Comparison:  9/10/2017.      Technique:   Brain MRI:  Axial diffusion, FLAIR, T2-weighted, susceptibility,  and  coronal T1-weighted images were obtained without intravenous contrast.    Head MRA: 3D time-of-flight MRA of the [place] was performed  without intravenous contrast.  Neck MRA:  Limited non contrast 2DTOF images were obtained of the  mid-cervical region.   Three-dimensional reconstructions of the neck and head MRA were  created, which were reviewed by the radiologist.    Findings:   Brain MRI: Axial diffusion weighted images demonstrate acute infarct  of the anterior and middle left temporal lobe. Chronic-appearing  lacunar infarcts of the left insula, right cerebellar hemisphere, and  posteromedial right occipital lobe. Minimal encephalomalacia  associated with the right cerebellar infarcts. On the FLAIR images,  there is nonspecific mild periventricular T2-hyperintensity, which are  most likely related to chronic small vessel ischemic disease. There is  no definite intracranial hemorrhage on susceptibility images.  Ventricles are proportionate to the cerebral sulci.     Head MRA demonstrates decreased filling of inferior left M3 branches  of the middle cerebral artery, which appeared to correspond with the  infarct. No aneurysm noted. Suspect infundibulum of the left  inferolateral trunk, possibly on the right as well. The anterior  communicating artery is patent. Grossly patent right middle cerebral  artery. The posterior circulation appears intact.     Neck MRA demonstrates patent major cervical arteries. Less than 50%  stenosis of the proximal internal carotid arteries bilaterally. The  normal distal right internal carotid artery measures 4 mm. The normal  distal left internal carotid artery measures 4 mm. Antegrade flow in  the major cervical vasculature.      Impression    Impression:  1. Redemonstration of acute infarct involving the anterior and middle  left temporal lobe. Head MRA is notable for diminished flow related  signal in the left middle cerebral artery territory, particularly in  an  inferior M3 branch.  2. Chronic-appearing infarcts of the left insula, right medial  occipital lobe, and right cerebellar hemisphere. Superimposed moderate  chronic small vessel ischemic disease.  3. Neck MRA demonstrates patent major cervical arteries.    I have personally reviewed the examination and initial interpretation  and I agree with the findings.    BILL TORRES MD   CT Chest/Abdomen/Pelvis w Contrast    Narrative    EXAMINATION: CT CHEST/ABDOMEN/PELVIS W CONTRAST  9/12/2017 7:24 PM      CLINICAL HISTORY: patient with new stroke and history of RCC. Concern  for oncologic process causing stroke.    COMPARISON: None.        PROCEDURE COMMENTS: CT of the chest, abdomen, and pelvis was performed  with iopamidol (ISOVUE-370) solution 74 mL intravenous and oral  contrast. Axial MIP  images of the chest, and coronal and sagittal  reformatted images of the chest, abdomen, and pelvis obtained.    FINDINGS:    Support devices: None.    Chest:  The thyroid gland is not visualized. Typical branching pattern of the  great vessels. Mild atherosclerotic calcifications of the thoracic  aorta. Coronary artery stents. The heart is not enlarged. No  pericardial or pleural effusion. Prominent paratracheal lymph nodes.  No axillary or hilar lymphadenopathy. Dependent atelectasis. The  trachea and central airways are clear. Mild upper lobe predominant  centrilobular emphysema.     Abdomen/pelvis:  Patent right common iliac artery stent. There are small stones  layering in the gallbladder. No adjacent fat stranding, wall  thickening, or pericholecystic fluid. Scattered atherosclerotic  vascular calcifications. Renal cortical atrophy and irregularity. 10  mm hyperdense lesion in the superior pole of the right kidney best  visualized on coronal images. Right extrarenal pelvis, a normal  variant. No radiopaque stone. Sigmoid diverticulosis without CT  evidence of diverticulitis. The liver and biliary system, spleen,  pancreas,  uterus, and adrenal glands are normal in appearance.    There are no abnormally dilated or thickened loops of small bowel or  colon.    There is no free air or free fluid. There are no abnormally sized  lymph nodes.    Bones and subcutaneous tissues:   Subcutaneous emphysema within left lower quadrant anterior abdominal  wall fat. No surrounding fluid collection. Healing right posterior  11th rib fracture. Well-corticated lucency along the left aspect of  the L2 vertebral body, possible hemangioma. Degenerative changes of  spine, most notably involving the lumbar spine, as reflected by  anterior osteophytes and intervertebral vacuum disc phenomenon. No  acute fracture or aggressive-appearing osseous lesion. Right lumbar  hernia, containing fat.      Impression    IMPRESSION:  1. 10 mm lesion along the superior pole of the right kidney may  represent a hemorrhagic or proteinaceous cyst, solid mass or residual  from prior intervention. Recommend comparison with prior outside  imaging.  2. Right lumbar hernia containing fat.  3. Mild upper lobe predominant centrilobular emphysema.  4. Prominent mediastinal lymph nodes.    I have personally reviewed the examination and initial interpretation  and I agree with the findings.    MARQUISE MCGOWAN MD   CBC with platelets   Result Value Ref Range    WBC 9.6 4.0 - 11.0 10e9/L    RBC Count 4.56 3.8 - 5.2 10e12/L    Hemoglobin 14.5 11.7 - 15.7 g/dL    Hematocrit 42.4 35.0 - 47.0 %    MCV 93 78 - 100 fl    MCH 31.8 26.5 - 33.0 pg    MCHC 34.2 31.5 - 36.5 g/dL    RDW 14.6 10.0 - 15.0 %    Platelet Count 245 150 - 450 10e9/L   Partial thromboplastin time   Result Value Ref Range    PTT 34 22 - 37 sec   INR   Result Value Ref Range    INR 0.93 0.86 - 1.14   Basic metabolic panel   Result Value Ref Range    Sodium 142 133 - 144 mmol/L    Potassium 4.5 3.4 - 5.3 mmol/L    Chloride 113 (H) 94 - 109 mmol/L    Carbon Dioxide 23 20 - 32 mmol/L    Anion Gap 6 3 - 14 mmol/L    Glucose  112 (H) 70 - 99 mg/dL    Urea Nitrogen 21 7 - 30 mg/dL    Creatinine 1.48 (H) 0.52 - 1.04 mg/dL    GFR Estimate 35 (L) >60 mL/min/1.7m2    GFR Estimate If Black 42 (L) >60 mL/min/1.7m2    Calcium 8.2 (L) 8.5 - 10.1 mg/dL   Troponin I   Result Value Ref Range    Troponin I ES 0.036 0.000 - 0.045 ug/L   Glucose by meter   Result Value Ref Range    Glucose 150 (H) 70 - 99 mg/dL   INR point of care   Result Value Ref Range    INR Point of Care 1.2 (H) 0.86 - 1.14   Creatinine POCT   Result Value Ref Range    Creatinine 1.6 (H) 0.52 - 1.04 mg/dL    GFR Estimate 32 (L) >60 mL/min/1.7m2    GFR Estimate If Black 39 (L) >60 mL/min/1.7m2   Glucose by meter   Result Value Ref Range    Glucose 100 (H) 70 - 99 mg/dL   Lipid panel: Fasting   Result Value Ref Range    Cholesterol 161 <200 mg/dL    Triglycerides 224 (H) <150 mg/dL    HDL Cholesterol 28 (L) >49 mg/dL    LDL Cholesterol Calculated 88 <100 mg/dL    Non HDL Cholesterol 133 (H) <130 mg/dL   Platelet count   Result Value Ref Range    Platelet Count 192 150 - 450 10e9/L   D dimer quantitative   Result Value Ref Range    D Dimer 0.8 (H) 0.0 - 0.50 ug/ml FEU   Lupus Anticoagulant Panel   Result Value Ref Range    Lupus Result Negative NEG^Negative   Cardiolipin Geeta IgG and IgM   Result Value Ref Range    Cardiolipin Antibody IgG <1.6 0.0 - 19.9 GPL-U/mL    Cardiolipin Antibody IgM 0.3 0.0 - 19.9 MPL-U/mL   Beta 2 Glycoprotein Antibodies IGG IGM   Result Value Ref Range    Beta 2 Glycoprotein 1 Antibody IgG 0.7 <7 U/mL    Beta 2 Glycoprotein 1 Antibody IgM 0.9 <7 U/mL   CRP inflammation   Result Value Ref Range    CRP Inflammation <2.9 0.0 - 8.0 mg/L   Erythrocyte sedimentation rate auto   Result Value Ref Range    Sed Rate 43 (H) 0 - 30 mm/h   Glucose by meter   Result Value Ref Range    Glucose 158 (H) 70 - 99 mg/dL   Glucose by meter   Result Value Ref Range    Glucose 138 (H) 70 - 99 mg/dL   Glucose by meter   Result Value Ref Range    Glucose 92 70 - 99 mg/dL   Basic  metabolic panel   Result Value Ref Range    Sodium 142 133 - 144 mmol/L    Potassium 4.6 3.4 - 5.3 mmol/L    Chloride 113 (H) 94 - 109 mmol/L    Carbon Dioxide 22 20 - 32 mmol/L    Anion Gap 6 3 - 14 mmol/L    Glucose 88 70 - 99 mg/dL    Urea Nitrogen 20 7 - 30 mg/dL    Creatinine 1.29 (H) 0.52 - 1.04 mg/dL    GFR Estimate 41 (L) >60 mL/min/1.7m2    GFR Estimate If Black 49 (L) >60 mL/min/1.7m2    Calcium 8.6 8.5 - 10.1 mg/dL   Glucose by meter   Result Value Ref Range    Glucose 103 (H) 70 - 99 mg/dL   Glucose by meter   Result Value Ref Range    Glucose 81 70 - 99 mg/dL   Hemoglobin A1c   Result Value Ref Range    Hemoglobin A1C 5.6 4.3 - 6.0 %   Glucose by meter   Result Value Ref Range    Glucose 154 (H) 70 - 99 mg/dL   Platelet count   Result Value Ref Range    Platelet Count 241 150 - 450 10e9/L   Basic metabolic panel   Result Value Ref Range    Sodium 140 133 - 144 mmol/L    Potassium 4.4 3.4 - 5.3 mmol/L    Chloride 108 94 - 109 mmol/L    Carbon Dioxide 23 20 - 32 mmol/L    Anion Gap 9 3 - 14 mmol/L    Glucose 66 (L) 70 - 99 mg/dL    Urea Nitrogen 21 7 - 30 mg/dL    Creatinine 1.39 (H) 0.52 - 1.04 mg/dL    GFR Estimate 37 (L) >60 mL/min/1.7m2    GFR Estimate If Black 45 (L) >60 mL/min/1.7m2    Calcium 9.0 8.5 - 10.1 mg/dL   Platelet count   Result Value Ref Range    Platelet Count 247 150 - 450 10e9/L   Basic metabolic panel   Result Value Ref Range    Sodium 139 133 - 144 mmol/L    Potassium 4.8 3.4 - 5.3 mmol/L    Chloride 108 94 - 109 mmol/L    Carbon Dioxide 26 20 - 32 mmol/L    Anion Gap 5 3 - 14 mmol/L    Glucose 85 70 - 99 mg/dL    Urea Nitrogen 35 (H) 7 - 30 mg/dL    Creatinine 1.63 (H) 0.52 - 1.04 mg/dL    GFR Estimate 31 (L) >60 mL/min/1.7m2    GFR Estimate If Black 38 (L) >60 mL/min/1.7m2    Calcium 9.2 8.5 - 10.1 mg/dL   Glucose by meter   Result Value Ref Range    Glucose 185 (H) 70 - 99 mg/dL   Glucose by meter   Result Value Ref Range    Glucose 91 70 - 99 mg/dL   Glucose by meter   Result  Value Ref Range    Glucose 108 (H) 70 - 99 mg/dL   Glucose by meter   Result Value Ref Range    Glucose 147 (H) 70 - 99 mg/dL   Glucose by meter   Result Value Ref Range    Glucose 93 70 - 99 mg/dL   Glucose by meter   Result Value Ref Range    Glucose 88 70 - 99 mg/dL   Glucose by meter   Result Value Ref Range    Glucose 96 70 - 99 mg/dL   Glucose by meter   Result Value Ref Range    Glucose 136 (H) 70 - 99 mg/dL   CBC with platelets   Result Value Ref Range    WBC 6.6 4.0 - 11.0 10e9/L    RBC Count 4.55 3.8 - 5.2 10e12/L    Hemoglobin 14.5 11.7 - 15.7 g/dL    Hematocrit 45.0 35.0 - 47.0 %    MCV 99 78 - 100 fl    MCH 31.9 26.5 - 33.0 pg    MCHC 32.2 31.5 - 36.5 g/dL    RDW 15.1 (H) 10.0 - 15.0 %    Platelet Count 265 150 - 450 10e9/L   Basic metabolic panel   Result Value Ref Range    Sodium 140 133 - 144 mmol/L    Potassium 4.9 3.4 - 5.3 mmol/L    Chloride 109 94 - 109 mmol/L    Carbon Dioxide 24 20 - 32 mmol/L    Anion Gap 7 3 - 14 mmol/L    Glucose 105 (H) 70 - 99 mg/dL    Urea Nitrogen 37 (H) 7 - 30 mg/dL    Creatinine 1.77 (H) 0.52 - 1.04 mg/dL    GFR Estimate 28 (L) >60 mL/min/1.7m2    GFR Estimate If Black 34 (L) >60 mL/min/1.7m2    Calcium 9.1 8.5 - 10.1 mg/dL   Glucose by meter   Result Value Ref Range    Glucose 129 (H) 70 - 99 mg/dL   Glucose by meter   Result Value Ref Range    Glucose 131 (H) 70 - 99 mg/dL   Glucose by meter   Result Value Ref Range    Glucose 138 (H) 70 - 99 mg/dL   Glucose by meter   Result Value Ref Range    Glucose 103 (H) 70 - 99 mg/dL   Glucose by meter   Result Value Ref Range    Glucose 93 70 - 99 mg/dL   Glucose by meter   Result Value Ref Range    Glucose 149 (H) 70 - 99 mg/dL   Glucose by meter   Result Value Ref Range    Glucose 96 70 - 99 mg/dL   Glucose by meter   Result Value Ref Range    Glucose 156 (H) 70 - 99 mg/dL   Glucose by meter   Result Value Ref Range    Glucose 90 70 - 99 mg/dL   Glucose by meter   Result Value Ref Range    Glucose 123 (H) 70 - 99 mg/dL    EKG 12-lead, tracing only   Result Value Ref Range    Interpretation ECG Click View Image link to view waveform and result    EKG 12-lead, tracing only   Result Value Ref Range    Interpretation ECG Click View Image link to view waveform and result    Physical Medicine & Rehab General Adult IP Consult: Patient to be seen: Routine within 24 hrs; Call back #: [phone]; Ischemic Stroke; Consultant may enter orders: Yes    Narrative    Gisselle Cid MD     9/11/2017  3:18 PM  [facility]   PM&R CONSULT    Consulting Provider: Aries Huber  Reason for Consult: Assessment of rehabilitation   Location of Patient: 6 a  Date of Encounter: 9/11/2017       ASSESSMENT/PLAN:    Ms. 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  Head CT was done and showed a acute infarct in the anterior and   middle left temporal lobe. Patient has history of chronic kidney   disease and as such a CT angiogram angiogram could not be   performed. She has undergone a T PARVEZ bubble study and an MRI and   an MRI is pending.  On examination she is noted to be with expressive aphasia, some   receptive aphasia as noted, and mild cognitive deficits as well   as poor insight. Patient is quite frustrated with her inability   to speak. She has limited comprehension.    In the earlier encounter when she was alone, her social history   and support system could not be evaluated. We will do second   attempt in the afternoon, when she was accompanied by her   daughter and grandchildren. Has daughter reported that the   patient eats herself lives in [place] along with another   daughter. She was not aware that the patient was  [place]. She is not aware where the patient was living. Given    Given that she does not have an appropriate discharge plan would   recommend transitional care unit placement for safety. However   following are recommendations patient was adamant repeatedly   saying no to another facility.  Despite patient's refusal, we continue to report recommended a   transitional care unit for safety and ongoing management of   aphasia.    HPI:        Sowmya Gibbs is a right-handed 71-year-old female with past   medical history of hypertension hyperlipidemia diabetes chronic   kidney disease and type and time and stroke with multiple TIAs   and CVAs. She presented on 9-10-17 she woke up with headaches and   in ability to speak  Initial NIH SS was 2      CT head revealed acute infarcts in the anterior and middle left   temporal lobe.    She has significant by a past medical history and has diabetic   and previous stroke which resulted in left homonymous   hemianopsia.  Further imaging studies including a bubble study and MRI are   being planned.      PREVIOUS LEVEL OF FUNCTION   prior to this admission she was independent with all aspects of   mobility and ADLs. Per her daughter she traveled extensively   including to [place] as well as last month.      CURRENT FUNCTION   PT  Discussed  with physical therapy she was evaluated and discharged   due to no further needs for physical therapy.    In occupational therapy she ambulated without an assistive device   and no concern for ADL    CURRENT RN NEEDS   with the RN she has been ambulating with standby assist.    She has a CHO consistent diet.    The patient has been wanting to go outside the whole day. She is   noted to be steady on her feet.    SOCIAL HISTORY/Home Setting/Support:   The social history is somewhat concerning. Per her daughter and   lives with her sister in [place]. Her daughter states that she   was unaware that the patient had moved to the [facility].    Patient's daughter states that she is not aware where the patient   was living in [place] she had recently been to [place]. A   discharge plan is unclear.  Social History     Social History     Marital status:      Spouse name: N/A     Number of children: N/A     Years of education: N/A     Social History Main Topics     Smoking status: Former Smoker     Types: Cigarettes     Quit date: 2/4/2016     Smokeless tobacco: Never Used      Comment: patient smokes about 3 cigarettes per day     Alcohol use No     Drug use: No     Sexual activity: No     Other Topics Concern     Parent/Sibling W/ Cabg, Mi Or Angioplasty Before 65f 55m? Yes     mother and sisters     Social History Narrative    Surgical History  Return To Top     Status Surgery Time Frame Comment Record Date     Inactive  laminectomy  1976, 1986 4/14/2008      Inactive  Kidney surgery  6/23/04  Cancer, kidney partial   removal  4/14/2008            ------------------------------------------------------------------  --------------    Food Allergy  Return To Top     Allergen Reaction Comment Record Date     * No known food allergies      9/28/2007            ------------------------------------------------------------------  --------------    Drug Allergy  Return To Top     Allergen Reaction Comment Record Date     TRICYCLIC ANTIDEPRESSANT    HYPER CRAZY  12/3/2008      Oxycodone  pruritis    12/3/2008      Valium      4/14/2008      SOMATROPIN  Could not walk or talk    4/14/2008      CODEINE      4/14/2008      DEMEROL      4/14/2008      SEPTRA      4/14/2008      Ceclor      4/14/2008      Penicillin      4/14/2008      Ultram  GI distress    4/14/2008            ------------------------------------------------------------------  --------------    Environment Allergy  Return To Top     Allergen Reaction Comment Record Date     * No known environmental allergies      9/28/2007             ------------------------------------------------------------------  --------------    Social History  Return To Top     Question Answer Comment Record Date     Marital status      9/28/2007      Emotional Abuse  No    9/7/2012      Exercise      9/28/2007      Caffeine  Yes    9/7/2012      Physical Abuse  No    9/7/2012      Sealtbelts      9/28/2007      Sexual Abuse  No    9/7/2012      Breast/Testicle Self Check      9/28/2007      Number of children  3    12/7/2007      Living arrangements  Apartment/Condo    9/28/2007      Number of children in household  0    9/28/2007      Number of adults in household  1    9/28/2007      Education level  College Graduate    9/28/2007      Employment  Currently employed    9/28/2007      Tobacco history  Quit less than 3 years ago    9/28/2007      Tobacco history  Currently smokes tobacco    8/14/2007      Number of years using tobacco  20    8/14/2007      Number of cigarettes/day  1  a 6-7 days when under stress    8/14/2007      Alcohol history  Never drinks alcohol    9/28/2007      Has the patient used marijuana?  No    9/7/2012      Has the patient used cocaine?  No    9/7/2012            ------------------------------------------------------------------  --------------    Medical History Return To Top     Status Diagnosis Time Frame Comment Record Date     Active (458.29) - C - Hypotension, iatrogenic      9/7/2012      Active (285.1) - C - Anemia, acute blood loss      9/7/2012      Active (440.21) - C - Intermittent claudication    right calf    7/26/2012      Active (440.0) - C - AORTIC ATHEROSCLEROSIS    with heavy   calcifiaction  7/26/2012      Active (413.9) - C - Angina pectoris, NOS    home stressors   difficulties in present living situation exacerbating this    6/5/2012      Active (435.9) - C - Transient ischemic attack, unspecified        6/5/2012      Active (414.01) - C - Coronary atherosclerosis, native coronary   artery    STENTS X 2 CLEO  AND 2012      Active (189.9) - C - Urinary cancer      2011      Active (276.8) - C - Hypokalemia      2011      Active (780.52) - C - Insomnia      2007      Active (401.1) - C - Hypertension, benign      2007      Active 305.1 Tobacco abuse      of lung cancer    2007      Active (244.9) - C - Hypothyroidism      2007      Active 346.00 Migraine, classic, not intractable      2007        Active 733.02 IDIOPATHIC OSTEOPOROSIS      2007      Active (780.79) - C - Fatigue      2007      Active 435.9 Transient ischemic attack, unspecified    4 small   strokes with aphasia transient  2007      Active (304.90) - C - Drug dependence, unspecified    darvon    2007      Active 189.9 Urinary cancer    small left kidney  2007      Active 346.80 MIGRAINE OT NOT INTRACTABLE      2007      Active (722.73) - C - Lumbar disc disorder w/myelopathy        2007      Active (311) - C - Depression      2007      Active (627.9) - C - Menopause      2007      Active (272.4) - C - Hyperlipidemia      2007      Inactive  (189.9) - C - Urinary cancer      2008      Inactive  (435.9) - C - Transient ischemic attack, unspecified        2008            ------------------------------------------------------------------  --------------    Medication History Return To Top         Isosorbide Mononitrate SR 30 mg 24 hr Tab, 1 Tablet(s), PO,   daily, 30 days, 11 refills, for a total of 30, start on 2012 and end on May 30, 2013.     Aspirin 81 mg Tab, Delayed Release, 1 Tablet(s), PO, daily, 90   days, 3 refills, for a total of 90, start on 2012 and   end on 2013.     Crestor 10 mg Tab, 1 Tablet(s), PO, daily, 30 days, 11 refills,   for a total of 30, start on 2012 and end on 2013.     Levothyroxine 88 mcg Tab, 1 Tablet(s), PO, daily, 30 days, 11   refills, for a total of  30, start on April 06, 2012 and end on   March 31, 2013.     Plavix 75 mg Tab, 1 Tablet(s), PO, daily, 30 days, 12 refills,   for a total of 30, start on April 06, 2012 and end on April 30, 2013.     Potassium Chloride SR 10 mEq Cap, 1 Capsule(s), PO, daily, 30   days, 11 refills, for a total of 30, start on April 06, 2012, end   on March 31, 2013 and every day.     Lyrica 50 mg Cap and Oral.     Vicodin 5 mg-500 mg Tab, 1 Tablet(s), PO, QID PRN, 30 days, for   a total of 20, start on September 07, 2012, end on October 06, 2012 and prn migraine HA- #20 must last one month per Dr Knott.     Ambien 10 mg Tab, 1 Tablet(s), PO, QHS PRN, 30 days, 2 refills,   for a total of 12, start on September 07, 2012 and end on   December 05, 2012.     Zolpidem 10 mg Tab, 1 Tablet(s), PO, QHS PRN, 30 days, for a   total of 20, start on July 26, 2012, end on August 24, 2012 and   TAKE ONE TABLET BY MOUTH AT BEDTIME AS NEEDED FOR SLEEP TO LAST   30 DAYS (Appended: Controlled substance eRx refill -   RxReferenceNumber: [id number]).     Hydrocodone-Acetaminophen 5 mg-500 mg Cap, 1 Capsule(s), PO, QID   PRN, 30 days, for a total of 30, start on July 26, 2012, end on   August 24, 2012 and ONE PO QID PRN MIGRAINE TO LAST ONE MONTH prn   migraine.     Zolpidem 10 mg Tab, Tablet(s), PO, for a total of 15, start on   June 20, 2012, end on July 25, 2012, TAKE ONE TABLET BY MOUTH AT   BEDTIME AS NEEDED FOR SLEEP TO LAST 30 DAYS (Appended: Controlled   substance eRx refill - RxReferenceNumber: [id number]) and   discontinued because: Refilled.     Hydrocodone-Acetaminophen 5 mg-500 mg Cap, 1 Capsule(s), PO, QID   PRN, 30 days, 1 refill, for a total of 30, start on June 07, 2012, end on July 25, 2012, ONE PO QID PRN MIGRAINE TO LAST ONE   MONTH prn migraine and discontinued because: Refilled.     Zolpidem 10 mg Tab, 1 Tablet(s), PO, QHS PRN, 30 days, for a   total of 15, start on June 07, 2012, end on June 20, 2012, prn   sleep, discontinued  because: Discontinued and Response to an   electronic refill request for a controlled substance (Schedule   IV) request - RxReferenceNumber: [id number].     Zolpidem 10 mg Tab, 1 Tablet(s), PO, QHS PRN, 30 days, for a   total of 15, start on May 10, 2012, end on June 04, 2012, prn   sleep and discontinued because: Refilled.     Hydrocodone-Acetaminophen 5 mg-500 mg Cap, 1 Capsule(s), PO, QID   PRN, 30 days, for a total of 20, start on May 10, 2012, end on   June 04, 2012, ONE PO QID PRN MIGRAINE TO LAST ONE MONTH prn   migraine and discontinued because: Refilled.     Hydrocodone-Acetaminophen 5 mg-500 mg Cap, 1 Capsule(s), PO, QID   PRN, 30 days, for a total of 20, start on April 06, 2012, end on   May 05, 2012, ONE PO QID PRN MIGRAINE TO LAST ONE MONTH prn   migraine and discontinued because: Refilled.     Zolpidem 10 mg Tab, 1 Tablet(s), PO, QHS PRN, 30 days, for a   total of 15, start on April 06, 2012, end on May 05, 2012, prn   sleep and discontinued because: Refilled.     Sertraline 25 mg Tab, 2 Tablet(s), PO, daily, 30 days, 3   refills, for a total of 60, start on April 06, 2012, end on June 05, 2012, 1/2 TABLET WITH FOOD X 15 DAYS THEN 1 TABLET DAILY WITH   FOOD THEN 1.5 TABLETS DAILY X 15 DAYS THEN 2 TABLETS DAILY and   discontinued because: Deleted.     Losartan 100 mg Tab, 1 Tablet(s), PO, daily, 30 days, 5 refills,   for a total of 30, start on April 06, 2012, end on September 07, 2012 and discontinued because: Deleted.     Hydrocodone-Acetaminophen 5 mg-500 mg Cap, 1 Capsule(s), PO, QID   PRN, 30 days, for a total of 20, start on March 06, 2012, end on   April 04, 2012, ONE PO QID PRN MIGRAINE TO LAST ONE MONTH and   discontinued because: Refilled.     Zolpidem 10 mg Tab, Tablet(s), PO, QHS PRN, 30 days, for a total   of 12, start on February 15, 2012, end on March 15, 2012 and TAKE   ONE TABLET BY MOUTH AT BEDTIME AS NEEDED FOR SLEEP (Appended:   Controlled substance eRx refill - RxReferenceNumber:  [id number]).     Hydrocodone-Acetaminophen 5 mg-500 mg Cap, 1 Capsule(s), PO, QID   PRN, 30 days, for a total of 20, start on February 13, 2012, end   on March 05, 2012, ONE PO QID PRN MIGRAINE TO LAST ONE MONTH and   discontinued because: Refilled.     Zolpidem 10 mg Tab, Tablet(s), PO, for a total of 12, start on   February 09, 2012, end on February 14, 2012, TAKE ONE TABLET BY   MOUTH AT BEDTIME AS NEEDED FOR SLEEP (Appended: Controlled   substance eRx refill - RxReferenceNumber: [id number]) and   discontinued because: Refilled.     Zolpidem 10 mg Tab, Tablet(s), PO, for a total of 12, start on   February 06, 2012, end on February 09, 2012, TAKE ONE TABLET BY   MOUTH AT BEDTIME AS NEEDED - MUST LAST 30 DAYS (Appended:   Controlled substance eRx refill - RxReferenceNumber: [id number]),   discontinued because: Discontinued and Response to an electronic   refill request for a controlled substance (Schedule IV) request -   RxReferenceNumber: [id number].     Zolpidem 10 mg Tab, Tablet(s), PO, for a total of 12, start on   February 06, 2012, end on February 06, 2012, TAKE ONE TABLET BY   MOUTH AT BEDTIME AS NEEDED FOR SLEEP (Appended: Controlled   substance eRx refill - RxReferenceNumber: [id number]), discontinued   because: Discontinued and Response to an electronic refill   request for a controlled substance (Schedule IV) request -   RxReferenceNumber: [id number].     Zolpidem 10 mg Tab, Tablet(s), PO, 30 days, for a total of 12,   start on December 22, 2011, end on February 06, 2012, TAKE ONE   TABLET BY MOUTH AT BEDTIME AS NEEDED OV NEEDED), discontinued   because: Discontinued and Response to an electronic refill   request for a controlled substance (Schedule IV) request -   RxReferenceNumber: [id number].     Vicodin 5 mg-500 mg Tab, 1 Tablet(s), PO, QID PRN, 30 days, for   a total of 20, start on December 20, 2011, end on January 18, 2012, prn migraine HA- #20 must last one month per Dr Knott   and discontinued because:  Refilled.     Zolpidem 10 mg Tab, 1 Tablet(s), PO, QHS PRN, 30 days, for a   total of 12, start on November 22, 2011, end on December 21, 2011   and TAKE ONE TABLET BY MOUTH AT BEDTIME AS NEEDED--per Dr Knott, #12 to last one month.     Vicodin 5 mg-500 mg Tab, 1 Tablet(s), PO, QID PRN, 30 days, for   a total of 20, start on November 22, 2011, end on December 19, 2011, prn migraine HA- #20 must last one month per Dr Knott   and discontinued because: Refilled.     Zolpidem 10 mg Tab, 1 Tablet(s), PO, QHS PRN, 30 days, for a   total of 12, start on October 24, 2011, end on November 22, 2011,   TAKE ONE TABLET BY MOUTH AT BEDTIME AS NEEDED--per Dr Knott,   #12 to last one month, discontinued because: Discontinued and   Response to an electronic refill request for a controlled   substance (Schedule IV) request - RxReferenceNumber: [id number].     Vicodin 5 mg-500 mg Tab, 1 Tablet(s), PO, QID PRN, 30 days, for   a total of 20, start on October 24, 2011, end on November 21, 2011, prn migraine HA- #20 must last one month per Dr Knott   and discontinued because: Refilled.     Vicodin 5 mg-500 mg Tab, 1 Tablet(s), PO, QID PRN, 28 days, for   a total of 20, start on September 26, 2011, end on October 23, 2011, prn migraine HA- #20 must last one month per Dr Sharma and   discontinued because: Refilled.     Zolpidem 10 mg Tab, 1 Tablet(s), PO, QHS PRN, 12 days, for a   total of 12, start on September 24, 2011, end on October 24, 2011, TAKE ONE TABLET BY MOUTH AT BEDTIME AS NEEDED (Appended:   Controlled substance eRx refill - RxReferenceNumber: [id number]),   discontinued because: Discontinued and Response to an electronic   refill request for a controlled substance (Schedule IV) request -   RxReferenceNumber: [id number].     Zolpidem 10 mg Tab, Tablet(s), PO, for a total of 12, start on   August 25, 2011, end on September 24, 2011, TAKE ONE TABLET BY   MOUTH AT BEDTIME AS NEEDED (Appended: Controlled substance eRx    refill - RxReferenceNumber: [id number]), discontinued because:   Discontinued and Response to an electronic refill request for a   controlled substance (Schedule IV) request - RxReferenceNumber:   [id number].     Zolpidem 10 mg Tab, Tablet(s), PO, for a total of 12, start on   August 25, 2011, end on December 21, 2011, TAKE ONE TABLET BY   MOUTH AT BEDTIME AS NEEDED (Appended: Controlled substance eRx   refill - RxReferenceNumber: [id number]) and discontinued because:   Refilled.     Vicodin 5 mg-500 mg Tab, 1 Tablet(s), PO, QID PRN, 5 days, for a   total of 20, start on August 25, 2011, end on August 29, 2011,   prn migraine HA- #20 must last one month and discontinued   because: Refilled.     Zolpidem 10 mg Tab, 1 Tablet(s), PO, QHS PRN, 30 days, for a   total of 12, start on August 25, 2011, end on August 25, 2011,   discontinued because: Discontinued and Response to an electronic   refill request for a controlled substance (Schedule IV) request -   RxReferenceNumber: [id number].     Zolpidem 10 mg Tab, 1 Tablet(s), PO, QHS PRN, 30 days, for a   total of 12, start on July 27, 2011, end on August 23, 2011 and   discontinued because: Refilled.     Vicodin 5 mg-500 mg Tab, 1 Tablet(s), PO, QID PRN, 30 days, for   a total of 20, start on July 27, 2011, end on August 24, 2011,   prn migraine HA- #20 must last one month and discontinued   because: Refilled.     Benicar 20 mg Tab, 1/2 Tablet(s), PO, daily, 30 days, 1 refill,   for a total of 15, start on June 23, 2011 and end on August 21, 2011.     Vicodin 5 mg-500 mg Tab, 1 Tablet(s), PO, QID PRN, 30 days, for   a total of 20, start on June 23, 2011, end on July 22, 2011, to   last 3 months through July 2008 and discontinued because:   Refilled.     Ambien 10 mg Tab, 1 Tablet(s), PO, QHS PRN, 30 days, for a total   of 12, start on June 23, 2011, end on July 22, 2011 and   discontinued because: Refilled.     Vicodin 5 mg-500 mg Tab, 1 Tablet(s), PO, QID PRN, 90 days, for   a  total of 60, start on May 09, 2011, end on June 22, 2011, to   last 3 months through July 2008 and discontinued because:   Refilled.     Ambien 10 mg Tab, 1 Tablet(s), PO, QHS PRN, 90 days, for a total   of 30, start on May 09, 2011, end on June 22, 2011 and   discontinued because: Refilled.     Ambien 10 mg Tab, 1 Tablet(s), PO, QHS PRN, 30 days, for a total   of 21, start on April 15, 2011, end on May 08, 2011 and   discontinued because: Refilled.     Ambien 10 mg Tab, 1 Tablet(s), PO, QHS PRN, 30 days, 2 refills,   for a total of 21, start on March 02, 2011, end on April 14, 2011   and discontinued because: Refilled.     Crestor 10 mg Tab, 1 Tablet(s), PO, daily, 30 days, 11 refills,   for a total of 30, start on February 25, 2011, end on February 19, 2012 and discontinued because: Refilled.     Levothroid 88 mcg Tab, 1 Tablet(s), PO, daily, 30 days, 11   refills, for a total of 30, start on February 25, 2011 and end on   February 19, 2012.     Vicodin 5 mg-500 mg Tab, 1 Tablet(s), PO, QID PRN, 2 refills,   for a total of 20, start on February 25, 2011 and Must last for   one month. OK per [facility] for #20, 0 RF's.     Vicodin 5 mg-500 mg Tab, 1 Tablet(s), PO, QID PRN, for a total   of 20, start on January 25, 2011, Must last for one month. OK per   [facility] for #20, 0 RF's and discontinued because: Refilled.     Vicodin 5 mg-500 mg Tab, 1 Tablet(s), PO, QID PRN, for a total   of 60, start on November 01, 2010, Must last for 3 months. OK #60   with not RF's per [facility]. and discontinued because: Refilled.     Vicodin 5 mg-500 mg Tab, 1 Tablet(s), PO, PRN, 30 days, 2   refills, for a total of 20, start on July 29, 2010 and one po 4 x   daily prn migraine.     Ambien 10 mg Tab, 1 Tablet(s), PO, QHS, 30 days, 2 refills, for   a total of 30, start on July 29, 2010, end on October 26, 2010,   4/26/10 pt needs OV per [facility] and discontinued because: Refilled.     Crestor 10 mg Tab, 1 Tablet(s), PO, daily, 30 days, 11 refills,   for a  total of 30, start on July 29, 2010, end on February 24, 2011 and discontinued because: Refilled.     Levothroid 88 mcg Tab, 1 Tablet(s), PO, daily, 90 days, 3   refills, for a total of 90, start on July 29, 2010, end on   February 24, 2011 and discontinued because: Refilled.     Vicodin 5 mg-500 mg Tab, 1 Tablet(s), PO, BID PRN, 30 days, for   a total of 60, start on June 28, 2010, end on July 27, 2010 and   discontinued because: Refilled.     Ambien 10 mg Tab, 1 Tablet(s), PO, QHS, for a total of 21, start   on April 26, 2010, 4/26/10 pt needs OV per [facility] and discontinued   because: Refilled.     Simvastatin 40 mg Tab, 1/2 Tablet(s), PO, daily, 30 days, 1   refill, for a total of 15, start on January 18, 2010, end on   March 18, 2010 and NEED LDL AND SGOT WHEN ON THIS ONE MONTH.     Levothroid 88 mcg Tab, 1 Tablet(s), PO, daily, 90 days, for a   total of 90, start on January 15, 2010, end on April 14, 2010 and   discontinued because: Refilled.     Ambien 10 mg Tab, 1 Tablet(s), PO, QHS, 30 days, for a total of   30, start on January 15, 2010, end on February 13, 2010 and   discontinued because: Refilled.     Vicodin 5 mg-500 mg Tab, 1 Tablet(s), PO, BID PRN, 30 days, 2   refills, for a total of 60, start on January 15, 2010, end on   April 14, 2010 and discontinued because: Refilled.     Crestor 10 mg Tab, 1 Tablet(s), PO, daily, 30 days, 11 refills,   for a total of 30, start on January 15, 2010, end on July 28, 2010 and discontinued because: Refilled.     Vicodin 5 mg-500 mg Tab, 1 Tablet(s), PO, PRN, 30 days, 2   refills, for a total of 20, start on December 11, 2009, end on   January 14, 2010, one po 4 x daily prn migraine and discontinued   because: Refilled.     Ambien 10 mg Tab, 1 Tablet(s), PO, QHS, 21 days, for a total of   21, start on December 11, 2009, end on December 31, 2009 and   NEIDA OMALLEY WILL CALL IN.     Levothroid 88 mcg Tab, 1 Tablet(s), PO, daily, 30 days, for a   total of 30, start on  2009, end on 2010,   NEED OFFICE VISIT & LABS  [facility] and discontinued       ------------------------------------------------------------------  --------------    Family History  Return To Top     Status Relationship Disease Comment Record Date     Alive Sister  Uterine cancer    2008         Father  Myocardial infarction  Age of death 72    2008         Paternal grandfather  Renal failure  Age of death 52    2008         Mother  Septicemia  Age of death 63  2008                 Past Medical History:  Past Medical History:   Diagnosis Date     Arthritis      CAD (coronary artery disease) 3/26/2014     Coronary artery disease      Coronary artery disease involving native coronary artery   without angina pectoris 3/26/2015     Hypertension      Malignant neoplasm (H)      Thyroid disease      Type 2 diabetes mellitus, controlled, with renal complications   (H) 2015     Type 2 diabetes, HbA1C goal < 8% (H) 2015     Unspecified cerebral artery occlusion with cerebral infarction              Current Medications:  [Narrative was truncated due to length]   Psychiatry IP Consult: suicidal ideation; Consultant may enter orders: Yes; Patient to be seen: Routine; Call back #: [phone]    Amaury Guerrier MD     2017 11:39 AM  Patient seen and chart reviewed. Formal consult   dictated.(initial)    Amaury Damon MD   Cardiothoracic Surgery IP Consult: Patient to be seen: Routine - within 24 hours; PARVEZ showing small mobile mass attached to A2 scallop of mitral valve, concern of whether this needs surgical intervention; Consultant may enter orders: Yes    Herson Salinas MD     9/15/2017  9:09 AM  Cardiothoracic Surgery Consult Note    Reason for Consult: PARVEZ showing small mobile mass attached to A2   scallop of mitral valve, concern of whether this needs surgical   intervention    HPI: Patient is a 71  year old female with a history of HTN, DMII,   HLD, previous strokes and a history of RCC s/p partial   nephrectomy, CKD who presented on 9/10/2017 with an acute onset   aphasia found to have anterior and middle left temporal lobe   acute infarcts. Patient underwent work-up, echo showed moderate   mitral regurgitation. 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    Patient denies chest pain, SOB, lightheadedness, dizziness.     PMH:  Past Medical History:   Diagnosis Date     Arthritis      CAD (coronary artery disease) 3/26/2014     Coronary artery disease      Coronary artery disease involving native coronary artery   without angina pectoris 3/26/2015     Hypertension      Malignant neoplasm (H)      Thyroid disease      Type 2 diabetes mellitus, controlled, with renal complications   (H) 1/5/2015     Type 2 diabetes, HbA1C goal < 8% (H) 1/5/2015     Unspecified cerebral artery occlusion with cerebral infarction          PSH:  Past Surgical History:   Procedure Laterality Date     KIDNEY SURGERY  06/23/2004    cancer, kidney partial removal       FH:  Family History   Problem Relation Age of Onset     HEART DISEASE Mother      Parkinsonism Father      HEART DISEASE Father      HEART DISEASE Sister      Family History Negative Other      females heart disease       SH:  Social History     Social History     Marital status:      Spouse name: N/A     Number of children: N/A     Years of education: N/A     Social History Main Topics     Smoking status: Former Smoker     Types: Cigarettes     Quit date: 2/4/2016     Smokeless tobacco: Never Used      Comment: patient smokes about 3 cigarettes per day     Alcohol use No     Drug use: No     Sexual activity: No     Other Topics Concern     Parent/Sibling W/ Cabg, Mi Or Angioplasty Before 65f 55m? Yes     mother and sisters     Social History  Narrative    Surgical History  Return To Top     Status Surgery Time Frame Comment Record Date     Inactive  laminectomy  1976, 1986 4/14/2008      Inactive  Kidney surgery  6/23/04  Cancer, kidney partial   removal  4/14/2008            ------------------------------------------------------------------  --------------    Food Allergy  Return To Top     Allergen Reaction Comment Record Date     * No known food allergies      9/28/2007            ------------------------------------------------------------------  --------------    Drug Allergy  Return To Top     Allergen Reaction Comment Record Date     TRICYCLIC ANTIDEPRESSANT    HYPER CRAZY  12/3/2008      Oxycodone  pruritis    12/3/2008      Valium      4/14/2008      SOMATROPIN  Could not walk or talk    4/14/2008      CODEINE      4/14/2008      DEMEROL      4/14/2008      SEPTRA      4/14/2008      Ceclor      4/14/2008      Penicillin      4/14/2008      Ultram  GI distress    4/14/2008            ------------------------------------------------------------------  --------------    Environment Allergy  Return To Top     Allergen Reaction Comment Record Date     * No known environmental allergies      9/28/2007            ------------------------------------------------------------------  --------------    Social History  Return To Top     Question Answer Comment Record Date     Marital status      9/28/2007      Emotional Abuse  No    9/7/2012      Exercise      9/28/2007      Caffeine  Yes    9/7/2012      Physical Abuse  No    9/7/2012      Sealtbelts      9/28/2007      Sexual Abuse  No    9/7/2012      Breast/Testicle Self Check      9/28/2007      Number of children  3    12/7/2007      Living arrangements  Apartment/Condo    9/28/2007      Number of children in household  0    9/28/2007      Number of adults in household  1    9/28/2007      Education level  College Graduate    9/28/2007      Employment  Currently employed    9/28/2007       Tobacco history  Quit less than 3 years ago    2007      Tobacco history  Currently smokes tobacco    2007      Number of years using tobacco  20    2007      Number of cigarettes/day  1  a 6-7 days when under stress    2007      Alcohol history  Never drinks alcohol    2007      Has the patient used marijuana?  No    2012      Has the patient used cocaine?  No    2012            ------------------------------------------------------------------  --------------    Medical History Return To Top     Status Diagnosis Time Frame Comment Record Date     Active (458.29) - C - Hypotension, iatrogenic      2012      Active (285.1) - C - Anemia, acute blood loss      2012      Active (440.21) - C - Intermittent claudication    right calf    2012      Active (440.0) - C - AORTIC ATHEROSCLEROSIS    with heavy   calcifiaction  2012      Active (413.9) - C - Angina pectoris, NOS    home stressors   difficulties in present living situation exacerbating this    2012      Active (435.9) - C - Transient ischemic attack, unspecified        2012      Active (414.01) - C - Coronary atherosclerosis, native coronary   artery    STENTS X 2  AND 2012      Active (189.9) - C - Urinary cancer      2011      Active (276.8) - C - Hypokalemia      2011      Active (780.52) - C - Insomnia      2007      Active (401.1) - C - Hypertension, benign      2007      Active 305.1 Tobacco abuse      of lung cancer    2007      Active (244.9) - C - Hypothyroidism      2007      Active 346.00 Migraine, classic, not intractable      2007        Active 733.02 IDIOPATHIC OSTEOPOROSIS      2007      Active (780.79) - C - Fatigue      2007      Active 435.9 Transient ischemic attack, unspecified    4 small   strokes with aphasia transient  2007      Active (304.90) - C - Drug dependence, unspecified    darvon    2007       Active 189.9 Urinary cancer    small left kidney  8/14/2007      Active 346.80 MIGRAINE OT NOT INTRACTABLE      8/14/2007      Active (722.73) - C - Lumbar disc disorder w/myelopathy        8/14/2007      Active (311) - C - Depression      8/14/2007      Active (627.9) - C - Menopause      8/14/2007      Active (272.4) - C - Hyperlipidemia      8/14/2007      Inactive  (189.9) - C - Urinary cancer      4/22/2008      Inactive  (435.9) - C - Transient ischemic attack, unspecified        4/22/2008            ------------------------------------------------------------------  --------------    Medication History Return To Top         Isosorbide Mononitrate SR 30 mg 24 hr Tab, 1 Tablet(s), PO,   daily, 30 days, 11 refills, for a total of 30, start on June 05, 2012 and end on May 30, 2013.     Aspirin 81 mg Tab, Delayed Release, 1 Tablet(s), PO, daily, 90   days, 3 refills, for a total of 90, start on April 06, 2012 and   end on March 31, 2013.     Crestor 10 mg Tab, 1 Tablet(s), PO, daily, 30 days, 11 refills,   for a total of 30, start on April 06, 2012 and end on March 31, 2013.     Levothyroxine 88 mcg Tab, 1 Tablet(s), PO, daily, 30 days, 11   refills, for a total of 30, start on April 06, 2012 and end on   March 31, 2013.     Plavix 75 mg Tab, 1 Tablet(s), PO, daily, 30 days, 12 refills,   for a total of 30, start on April 06, 2012 and end on April 30, 2013.     Potassium Chloride SR 10 mEq Cap, 1 Capsule(s), PO, daily, 30   days, 11 refills, for a total of 30, start on April 06, 2012, end   on March 31, 2013 and every day.     Lyrica 50 mg Cap and Oral.     Vicodin 5 mg-500 mg Tab, 1 Tablet(s), PO, QID PRN, 30 days, for   a total of 20, start on September 07, 2012, end on October 06, 2012 and prn migraine HA- #20 must last one month per Dr Knott.     Ambien 10 mg Tab, 1 Tablet(s), PO, QHS PRN, 30 days, 2 refills,   for a total of 12, start on September 07, 2012 and end on   December 05, 2012.      Zolpidem 10 mg Tab, 1 Tablet(s), PO, QHS PRN, 30 days, for a   total of 20, start on July 26, 2012, end on August 24, 2012 and   TAKE ONE TABLET BY MOUTH AT BEDTIME AS NEEDED FOR SLEEP TO LAST   30 DAYS (Appended: Controlled substance eRx refill -   RxReferenceNumber: [id number]).     Hydrocodone-Acetaminophen 5 mg-500 mg Cap, 1 Capsule(s), PO, QID   PRN, 30 days, for a total of 30, start on July 26, 2012, end on   August 24, 2012 and ONE PO QID PRN MIGRAINE TO LAST ONE MONTH prn   migraine.     Zolpidem 10 mg Tab, Tablet(s), PO, for a total of 15, start on   June 20, 2012, end on July 25, 2012, TAKE ONE TABLET BY MOUTH AT   BEDTIME AS NEEDED FOR SLEEP TO LAST 30 DAYS (Appended: Controlled   substance eRx refill - RxReferenceNumber: [id number]) and   discontinued because: Refilled.     Hydrocodone-Acetaminophen 5 mg-500 mg Cap, 1 Capsule(s), PO, QID   PRN, 30 days, 1 refill, for a total of 30, start on June 07, 2012, end on July 25, 2012, ONE PO QID PRN MIGRAINE TO LAST ONE   MONTH prn migraine and discontinued because: Refilled.     Zolpidem 10 mg Tab, 1 Tablet(s), PO, QHS PRN, 30 days, for a   total of 15, start on June 07, 2012, end on June 20, 2012, prn   sleep, discontinued because: Discontinued and Response to an   electronic refill request for a controlled substance (Schedule   IV) request - RxReferenceNumber: [id number].     Zolpidem 10 mg Tab, 1 Tablet(s), PO, QHS PRN, 30 days, for a   total of 15, start on May 10, 2012, end on June 04, 2012, prn   sleep and discontinued because: Refilled.     Hydrocodone-Acetaminophen 5 mg-500 mg Cap, 1 Capsule(s), PO, QID   PRN, 30 days, for a total of 20, start on May 10, 2012, end on   June 04, 2012, ONE PO QID PRN MIGRAINE TO LAST ONE MONTH prn   migraine and discontinued because: Refilled.     Hydrocodone-Acetaminophen 5 mg-500 mg Cap, 1 Capsule(s), PO, QID   PRN, 30 days, for a total of 20, start on April 06, 2012, end on   May 05, 2012, ONE PO QID PRN MIGRAINE TO LAST ONE  MONTH prn   migraine and discontinued because: Refilled.     Zolpidem 10 mg Tab, 1 Tablet(s), PO, QHS PRN, 30 days, for a   total of 15, start on April 06, 2012, end on May 05, 2012, prn   sleep and discontinued because: Refilled.     Sertraline 25 mg Tab, 2 Tablet(s), PO, daily, 30 days, 3   refills, for a total of 60, start on April 06, 2012, end on June 05, 2012, 1/2 TABLET WITH FOOD X 15 DAYS THEN 1 TABLET DAILY WITH   FOOD THEN 1.5 TABLETS DAILY X 15 DAYS THEN 2 TABLETS DAILY and   discontinued because: Deleted.     Losartan 100 mg Tab, 1 Tablet(s), PO, daily, 30 days, 5 refills,   for a total of 30, start on April 06, 2012, end on September 07, 2012 and discontinued because: Deleted.     Hydrocodone-Acetaminophen 5 mg-500 mg Cap, 1 Capsule(s), PO, QID   PRN, 30 days, for a total of 20, start on March 06, 2012, end on   April 04, 2012, ONE PO QID PRN MIGRAINE TO LAST ONE MONTH and   discontinued because: Refilled.     Zolpidem 10 mg Tab, Tablet(s), PO, QHS PRN, 30 days, for a total   of 12, start on February 15, 2012, end on March 15, 2012 and TAKE   ONE TABLET BY MOUTH AT BEDTIME AS NEEDED FOR SLEEP (Appended:   Controlled substance eRx refill - RxReferenceNumber: [id number]).     Hydrocodone-Acetaminophen 5 mg-500 mg Cap, 1 Capsule(s), PO, QID   PRN, 30 days, for a total of 20, start on February 13, 2012, end   on March 05, 2012, ONE PO QID PRN MIGRAINE TO LAST ONE MONTH and   discontinued because: Refilled.     Zolpidem 10 mg Tab, Tablet(s), PO, for a total of 12, start on   February 09, 2012, end on February 14, 2012, TAKE ONE TABLET BY   MOUTH AT BEDTIME AS NEEDED FOR SLEEP (Appended: Controlled   substance eRx refill - RxReferenceNumber: [id number]) and   discontinued because: Refilled.     Zolpidem 10 mg Tab, Tablet(s), PO, for a total of 12, start on   February 06, 2012, end on February 09, 2012, TAKE ONE TABLET BY   MOUTH AT BEDTIME AS NEEDED - MUST LAST 30 DAYS (Appended:   Controlled substance eRx  refill - RxReferenceNumber: [id number]),   discontinued because: Discontinued and Response to an electronic   refill request for a controlled substance (Schedule IV) request -   RxReferenceNumber: [id number].     Zolpidem 10 mg Tab, Tablet(s), PO, for a total of 12, start on   February 06, 2012, end on February 06, 2012, TAKE ONE TABLET BY   MOUTH AT BEDTIME AS NEEDED FOR SLEEP (Appended: Controlled   substance eRx refill - RxReferenceNumber: [id number]), discontinued   because: Discontinued and Response to an electronic refill   request for a controlled substance (Schedule IV) request -   RxReferenceNumber: [id number].     Zolpidem 10 mg Tab, Tablet(s), PO, 30 days, for a total of 12,   start on December 22, 2011, end on February 06, 2012, TAKE ONE   TABLET BY MOUTH AT BEDTIME AS NEEDED OV NEEDED), discontinued   because: Discontinued and Response to an electronic refill   request for a controlled substance (Schedule IV) request -   RxReferenceNumber: [id number].     Vicodin 5 mg-500 mg Tab, 1 Tablet(s), PO, QID PRN, 30 days, for   a total of 20, start on December 20, 2011, end on January 18, 2012, prn migraine HA- #20 must last one month per Dr Knott   and discontinued because: Refilled.     Zolpidem 10 mg Tab, 1 Tablet(s), PO, QHS PRN, 30 days, for a   total of 12, start on November 22, 2011, end on December 21, 2011   and TAKE ONE TABLET BY MOUTH AT BEDTIME AS NEEDED--per Dr Knott, #12 to last one month.     Vicodin 5 mg-500 mg Tab, 1 Tablet(s), PO, QID PRN, 30 days, for   a total of 20, start on November 22, 2011, end on December 19, 2011, prn migraine HA- #20 must last one month per Dr Knott   and discontinued because: Refilled.     Zolpidem 10 mg Tab, 1 Tablet(s), PO, QHS PRN, 30 days, for a   total of 12, start on October 24, 2011, end on November 22, 2011,   TAKE ONE TABLET BY MOUTH AT BEDTIME AS NEEDED--per Dr Knott,   #12 to last one month, discontinued because: Discontinued and   Response to an  electronic refill request for a controlled   substance (Schedule IV) request - RxReferenceNumber: [id number].     Vicodin 5 mg-500 mg Tab, 1 Tablet(s), PO, QID PRN, 30 days, for   a total of 20, start on October 24, 2011, end on November 21, 2011, prn migraine HA- #20 must last one month per Dr Knott   and discontinued because: Refilled.     Vicodin 5 mg-500 mg Tab, 1 Tablet(s), PO, QID PRN, 28 days, for   a total of 20, start on September 26, 2011, end on October 23, 2011, prn migraine HA- #20 must last one month per Dr Sharma and   discontinued because: Refilled.     Zolpidem 10 mg Tab, 1 Tablet(s), PO, QHS PRN, 12 days, for a   total of 12, start on September 24, 2011, end on October 24, 2011, TAKE ONE TABLET BY MOUTH AT BEDTIME AS NEEDED (Appended:   Controlled substance eRx refill - RxReferenceNumber: [id number]),   discontinued because: Discontinued and Response to an electronic   refill request for a controlled substance (Schedule IV) request -   RxReferenceNumber: [id number].     Zolpidem 10 mg Tab, Tablet(s), PO, for a total of 12, start on   August 25, 2011, end on September 24, 2011, TAKE ONE TABLET BY   MOUTH AT BEDTIME AS NEEDED (Appended: Controlled substance eRx   refill - RxReferenceNumber: [id number]), discontinued because:   Discontinued and Response to an electronic refill request for a   controlled substance (Schedule IV) request - RxReferenceNumber:   [id number].     Zolpidem 10 mg Tab, Tablet(s), PO, for a total of 12, start on   August 25, 2011, end on December 21, 2011, TAKE ONE TABLET BY   MOUTH AT BEDTIME AS NEEDED (Appended: Controlled substance eRx   refill - RxReferenceNumber: [id number]) and discontinued because:   Refilled.     Vicodin 5 mg-500 mg Tab, 1 Tablet(s), PO, QID PRN, 5 days, for a   total of 20, start on August 25, 2011, end on August 29, 2011,   prn migraine HA- #20 must last one month and discontinued   because: Refilled.     Zolpidem 10 mg Tab, 1 Tablet(s), PO, QHS PRN, 30 days, for  a   total of 12, start on August 25, 2011, end on August 25, 2011,   discontinued because: Discontinued and Response to an electronic   refill request for a controlled substance (Schedule IV) request -   RxReferenceNumber: [id number].     Zolpidem 10 mg Tab, 1 Tablet(s), PO, QHS PRN, 30 days, for a   total of 12, start on July 27, 2011, end on August 23, 2011 and   discontinued because: Refilled.     Vicodin 5 mg-500 mg Tab, 1 Tablet(s), PO, QID PRN, 30 days, for   a total of 20, start on July 27, 2011, end on August 24, 2011,   prn migraine HA- #20 must last one month and discontinued   because: Refilled.     Benicar 20 mg Tab, 1/2 Tablet(s), PO, daily, 30 days, 1 refill,   for a total of 15, start on June 23, 2011 and end on August 21, 2011.     Vicodin 5 mg-500 mg Tab, 1 Tablet(s), PO, QID PRN, 30 days, for   a total of 20, start on June 23, 2011, end on July 22, 2011, to   last 3 months through July 2008 and discontinued because:   Refilled.     Ambien 10 mg Tab, 1 Tablet(s), PO, QHS PRN, 30 days, for a total   of 12, start on June 23, 2011, end on July 22, 2011 and   discontinued because: Refilled.     Vicodin 5 mg-500 mg Tab, 1 Tablet(s), PO, QID PRN, 90 days, for   a total of 60, start on May 09, 2011, end on June 22, 2011, to   last 3 months through July 2008 and discontinued because:   Refilled.     Ambien 10 mg Tab, 1 Tablet(s), PO, QHS PRN, 90 days, for a total   of 30, start on May 09, 2011, end on June 22, 2011 and   discontinued because: Refilled.     Ambien 10 mg Tab, 1 Tablet(s), PO, QHS PRN, 30 days, for a total   of 21, start on April 15, 2011, end on May 08, 2011 and   discontinued because: Refilled.     Ambien 10 mg Tab, 1 Tablet(s), PO, QHS PRN, 30 days, 2 refills,   for a total of 21, start on March 02, 2011, end on April 14, 2011   and discontinued because: Refilled.     Crestor 10 mg Tab, 1 Tablet(s), PO, daily, 30 days, 11 refills,   for a total of 30, start on February 25, 2011, end on  February 19, 2012 and discontinued because: Refilled.     Levothroid 88 mcg Tab, 1 Tablet(s), PO, daily, 30 days, 11   refills, for a total of 30, start on February 25, 2011 and end on   February 19, 2012.     Vicodin 5 mg-500 mg Tab, 1 Tablet(s), PO, QID PRN, 2 refills,   for a total of 20, start on February 25, 2011 and Must last for   one month. OK per [facility] for #20, 0 RF's.     Vicodin 5 mg-500 mg Tab, 1 Tablet(s), PO, QID PRN, for a total   of 20, start on January 25, 2011, Must last for one month. OK per   [facility] for #20, 0 RF's and discontinued because: Refilled.     Vicodin 5 mg-500 mg Tab, 1 Tablet(s), PO, QID PRN, for a total   of 60, start on November 01, 2010, Must last for 3 months. OK #60   with not RF's per [facility]. and discontinued because: Refilled.     Vicodin 5 mg-500 mg Tab, 1 Tablet(s), PO, PRN, 30 days, 2   refills, for a total of 20, start on July 29, 2010 and one po 4 x   daily prn migraine.     Ambien 10 mg Tab, 1 Tablet(s), PO, QHS, 30 days, 2 refills, for   a total of 30, start on July 29, 2010, end on October 26, 2010,   4/26/10 pt needs OV per [facility] and discontinued because: Refilled.     Crestor 10 mg Tab, 1 Tablet(s), PO, daily, 30 days, 11 refills,   for a total of 30, start on July 29, 2010, end on February 24, 2011 and discontinued because: Refilled.     Levothroid 88 mcg Tab, 1 Tablet(s), PO, daily, 90 days, 3   refills, for a total of 90, start on July 29, 2010, end on   February 24, 2011 and discontinued because: Refilled.     Vicodin 5 mg-500 mg Tab, 1 Tablet(s), PO, BID PRN, 30 days, for   a total of 60, start on June 28, 2010, end on July 27, 2010 and   discontinued because: Refilled.     Ambien 10 mg Tab, 1 Tablet(s), PO, QHS, for a total of 21, start   on April 26, 2010, 4/26/10 pt needs OV per [facility] and discontinued   because: Refilled.     Simvastatin 40 mg Tab, 1/2 Tablet(s), PO, daily, 30 days, 1   refill, for a total of 15, start on January 18, 2010, end on   March 18, 2010 and  NEED LDL AND SGOT WHEN ON THIS ONE MONTH.     Levothroid 88 mcg Tab, 1 Tablet(s), PO, daily, 90 days, for a   total of 90, start on January 15, 2010, end on 2010 and   discontinued because: Refilled.     Ambien 10 mg Tab, 1 Tablet(s), PO, QHS, 30 days, for a total of   30, start on January 15, 2010, end on 2010 and   discontinued because: Refilled.     Vicodin 5 mg-500 mg Tab, 1 Tablet(s), PO, BID PRN, 30 days, 2   refills, for a total of 60, start on January 15, 2010, end on   2010 and discontinued because: Refilled.     Crestor 10 mg Tab, 1 Tablet(s), PO, daily, 30 days, 11 refills,   for a total of 30, start on January 15, 2010, end on 2010 and discontinued because: Refilled.     Vicodin 5 mg-500 mg Tab, 1 Tablet(s), PO, PRN, 30 days, 2   refills, for a total of 20, start on 2009, end on   2010, one po 4 x daily prn migraine and discontinued   because: Refilled.     Ambien 10 mg Tab, 1 Tablet(s), PO, QHS, 21 days, for a total of   21, start on 2009, end on 2009 and   NEIDA OMALLEY WILL CALL IN.     Levothroid 88 mcg Tab, 1 Tablet(s), PO, daily, 30 days, for a   total of 30, start on 2009, end on 2010,   NEED OFFICE VISIT & LABS  [facility] and discontinued       ------------------------------------------------------------------  --------------    Family History  Return To Top     Status Relationship Disease Comment Record Date     Alive Sister  Uterine cancer    2008         Father  Myocardial infarction  Age of death 72    2008         Paternal grandfather  Renal failure  Age of death 52    2008         Mother  Septicemia  Age of death 63  2008                 Home Meds:  Prescriptions Prior to Admission   Medication Sig Dispense Refill Last Dose     zolpidem (AMBIEN) 5 MG tablet TAKE ONE TABLET BY MOUTH ONCE   DAILY AT BEDTIME AS NEEDED 15  tablet 2      HYDROcodone-acetaminophen (NORCO) 7.5-325 MG per tablet Take 1   tablet by mouth every 6 hours as needed for moderate to severe   pain 30 tablet 0      levothyroxine (SYNTHROID, LEVOTHROID) 88 MCG tablet Take 1   tablet (88 mcg) by mouth daily 90 tablet 3      carvedilol (COREG) 12.5 MG tablet Take 1 tablet (12.5 mg) by   mouth 2 times daily (with meals) 180 tablet 0      fenofibrate 145 MG tablet Take 1 tablet (145 mg) by mouth daily   90 tablet 3      rosuvastatin (CRESTOR) 10 MG tablet Take 1 tablet (10 mg) by   mouth daily 90 tablet 0      clopidogrel (PLAVIX) 75 MG tablet Take 1 tablet (75 mg) by   mouth daily 90 tablet 0      cinnamon (HM CINNAMON) 500 MG CAPS Take 2 capfuls by mouth 3   times daily 180 capsule 11      Alpha-Lipoic Acid 200 MG TABS Take 1 tablet by mouth 2 times   daily 180 tablet 3      Cholecalciferol (VITAMIN D) 2000 UNITS CAPS Take 1 tablet by   mouth daily 90 capsule 3      blood glucose (ZEE MICROLET 2) lancing device Use to test   blood sugars TWICE DAILY  times daily or as directed. 300 each 0   Taking     ASPIRIN NOT PRESCRIBED, INTENTIONAL, 1 each daily Antiplatelet   medication not prescribed intentionally due to Current   thienopryridine therapy 0 each 0 Taking     SODIUM BICARBONATE PO Take 20 mg by mouth 3 times daily     Taking     blood glucose (ZEE CONTOUR) test strip Use to test blood   sugars TWICE DAILY  times daily or as directed. 100 strip 11   Taking     blood glucose monitoring (ZEE CONTOUR MONITOR) meter device   kit Use to test blood sugars TWICE DAILY  times daily or as   directed.  MAY HAVE GENERIC CONTROL SOLUTIONS, STRIPS TWICE DAILY AND   LANCETS   NEWLY DISCOVERED DIABETES 2 GOAL 8% 250.00  MAY HAVE GENERIC KIT AS WELL 1 kit 0 Taking     blood glucose calibration (ZEE CONTOUR) NORMAL solution Use   to calibrate blood glucose monitor as directed. 1 Bottle 11   Taking     blood glucose monitoring (ONE TOUCH DELICA) lancets Use to test   blood  sugars TWICE DAILY  times daily or as directed.  GENERIC OK 1 Box 11 Taking     nitroglycerin (NITROSTAT) 0.4 MG SL tablet Place 1 tablet (0.4   mg) under the tongue every 5 minutes as needed for chest pain 25   tablet prn Taking     FISH OIL 3 capsules every morning.   Taking       Allergies:  Allergies   Allergen Reactions     Ceclor [Cefaclor Monohydrate]      Codeine    [Narrative was truncated due to length]   Psychiatry IP Consult: assessment for decision making capacity; Consultant may enter orders: Yes; Patient to be seen: Routine; Call back #: [phone]    Narrative    Lui Herr MD     2017 10:21 AM  Follow-up consult to assess capacity for requesting discharge.   Dictation to follow.   Troponin POCT   Result Value Ref Range    Troponin I 0.00 0.00 - 0.10 ug/L   Echo Complete Bubble    Narrative    [id number]  [id number]  [id number]  [id number]^ZARA^MARQUISE^           [facility],[place]  Echocardiography Laboratory  [address] [place] [id number]     Name: JOLLY EDWARDS  MRN: [id number]  : 1945  Study Date: 2017 10:08 AM  Age: 71 yrs  Gender: Female  Patient Location: [facility]  Reason For Study: CVI  Ordering Physician: MARQUISE ZUÑIGA  Performed By: Yosef Ortiz RDCS     BSA: 1.6 m2  Height: 65 in  Weight: 122 lb  HR: 58  BP: 131/81 mmHg  _____________________________________________________________________________  __        Procedure  Bubble Echocardiogram with two-dimensional, color and spectral Doppler  performed.  _____________________________________________________________________________  __        Interpretation Summary  No cardiac source for embolus identified. The atrial septum is intact as  assessed by color Doppler and agitated dextrose bubble study .  _____________________________________________________________________________  __        Left Ventricle  Global and regional left ventricular function is normal with an EF of  55-60%.  Left ventricular wall thickness is normal. Left ventricular size is normal.  Normal left ventricular filling for age. No regional wall motion abnormalities  are seen.     Right Ventricle  Right ventricular function, chamber size, wall motion, and thickness are  normal.     Atria  Both atria appear normal. The atrial septum is intact as assessed by color  Doppler and agitated dextrose bubble study .     Mitral Valve  Mild mitral annular calcification is present. Moderate mitral insufficiency is  present.        Aortic Valve  Mild aortic valve sclerosis is present. Mild aortic insufficiency is present.     Tricuspid Valve  The tricuspid valve is normal. Mild tricuspid insufficiency is present. The  right ventricular systolic pressure is approximated at 25.6 mmHg plus the  right atrial pressure.     Pulmonic Valve  The pulmonic valve is normal.     Vessels  The aorta root is normal. The pulmonary artery cannot be assessed. The  inferior vena cava cannot be assessed.     Pericardium  No pericardial effusion is present.     _____________________________________________________________________________  __  MMode/2D Measurements & Calculations  IVSd: 0.98 cm  LVIDd: 4.5 cm  LVIDs: 3.2 cm  LVPWd: 1.1 cm  FS: 29.1 %  EDV(Teich): 91.0 ml  ESV(Teich): 40.0 ml     LV mass(C)d: 157.1 grams  LV mass(C)dI: 98.0 grams/m2  Ao root diam: 3.0 cm  asc Aorta Diam: 3.0 cm  LVOT diam: 1.9 cm  LVOT area: 2.8 cm2  LA Volume (BP): 55.3 ml  LA Volume Index (BP): 34.6 ml/m2        Doppler Measurements & Calculations  MV E max ld: 96.0 cm/sec  MV A max ld: 79.5 cm/sec  MV E/A: 1.2  MV dec slope: 540.0 cm/sec2     AI P1/2t: 563.9 msec  PA acc time: 0.11 sec  TR max ld: 253.0 cm/sec  TR max P.6 mmHg  Lateral E/e': 13.8  Medial E/e': 18.4           _____________________________________________________________________________  __           Report approved by: Kevin Reyes 2017 11:01 AM      Transesophageal Echocardiogram     Narrative    [id number]  [facility]  [id number]  [id number]^SPRAGUE^URBAN^           [facility],[place]  Echocardiography Laboratory  [address] [place] [id number]        Name: JOLLY EDWARDS  MRN: [id number]  : 1945  Study Date: 2017 12:31 PM  Age: 71 yrs  Gender: Female  Patient Location: [facility]  Reason For Study: Cerebrovascular Incident  Ordering Physician: URBAN SPRAGUE  Performed By: Cecile Ramos     BSA: 1.6 m2  Height: 65 in  Weight: 123 lb  HR: 57  BP: 144/77 mmHg  Attestation  I was present during PARVEZ probe placement by the fellow, Cecile Ramos. I  personally viewed the imaging and agree with the interpretation and report as  documented by the fellow.  _____________________________________________________________________________  __     Interpretation Summary  Global and regional left ventricular function is normal with an EF of 55-60%.  Global right ventricular function is normal.  The left atrial appendage is free of spontaneous echo contrast and thrombus.  The atrial septum is intact as assessed by color Doppler and agitated saline  bubble study .  The mitral valve leaflet thickness is normal. There is a small mobile mass  attached to the A2 scallop of the mitral valve, consistent with a ruptured  minor chordae. There is mild-moderate mitral regurgitation (ERO 0.18 cm2, RVol  42 mL/beat).  Complex atheroma of the aorta present in the arch and descending thoracic  aorta.  No pericardial effusion present.     No significant change from prior TTE study dated 17.     _____________________________________________________________________________  __        Procedure  Transesophageal Echocardiogram with color and spectral Doppler performed. 3D  image acquisition, reconstruction, and real-time interpretation was performed.  Procedure location Echo Lab. Informed consent for Transesophegeal echo  obtained. PARVEZ Probe #4 was used during the procedure.  Patient was sedated  using Fentanyl 50 mcg. Patient was sedated using Versed 2 mg. Total sedation  time: 32 minutes of continuous bedside 1:1 monitoring. The Transducer was  inserted with some difficulty . The patient tolerated the procedure well.  Complications None. The procedure was performed in the Echo Lab. The patient's  rhythm is sinus bradycardia. Good quality two-dimensional was performed and  interpreted.     Left Ventricle  Left ventricular size is normal. Left ventricular wall thickness is normal.  Global and regional left ventricular function is normal with an EF of 55-60%.     Right Ventricle  The right ventricle is normal size. Global right ventricular function is  normal.     Atria  The left atrial appendage is normal. It is free of spontaneous echo contrast  and thrombus. The right atria appears normal. Mild left atrial enlargement is  present. The atrial septum is intact as assessed by color Doppler and agitated  saline bubble study . A prominent eustachian valve is noted.        Mitral Valve  The mitral valve leaflet thickness is normal. There is a small mobile mass  attached to the A2 scallop of the mitral valve, consistent with a ruptured  minor chordae. There is mild-moderate mitral regurgitation (ERO 0.18 cm2, RVol  42 mL/beat).     Aortic Valve  The aortic valve is tricuspid. Mild aortic valve sclerosis is present. Trace  aortic insufficiency is present.     Tricuspid Valve  Mild calcification of the tricuspid valve chordae. Trace tricuspid  insufficiency is present.     Pulmonic Valve  The pulmonic valve is normal. Trace pulmonic insufficiency is present.     Vessels  The aortic root is normal in caliber (3.0 cm at the sinuses of Valsalva).  There is a small chunk of calcium at the sinotubular ridge. The proximal  ascending aorta is normal in caliber (3.0 cm). Complex atheroma of the aorta  present in the arch and descending thoracic aorta. Normal pulmonary arteries.  Normal pulmonary vein  velocity.        Pericardium  No pericardial effusion is present.     Compared to Previous Study  No significant change from prior TTE study dated 9/11/17.     _____________________________________________________________________________  __                       Report approved by: Kevin Dumont 09/12/2017 03:38 PM           _____________________________________________________________________________  __      Methicillin Resistant Staph Aureus PCR   Result Value Ref Range    Specimen Description Nares     S Aur Meth Resis PCR Negative NEG^Negative     ASSESSMENT:   \    ICD-10-CM    1. Expressive aphasia syndrome R47.01    2. Nonintractable migraine, unspecified migraine type G43.009 HYDROcodone-acetaminophen (NORCO) 7.5-325 MG per tablet     DISCONTINUED: HYDROcodone-acetaminophen (NORCO) 7.5-325 MG per tablet   3. Controlled type 2 diabetes mellitus with microalbuminuria, without long-term current use of insulin (H) E11.29     R80.9    4. Atherosclerosis of aorta (H) I70.0    5. Atherosclerosis of native coronary artery of native heart without angina pectoris I25.10    6. Idiopathic osteoporosis M81.8    7. Hyperlipidemia LDL goal <70 E78.5    8. Hypertension goal BP (blood pressure) < 130/80 I10    9. Cerebrovascular accident (CVA), unspecified mechanism (H) I63.9 clopidogrel (PLAVIX) 75 MG tablet     aspirin 81 MG EC tablet     atorvastatin (LIPITOR) 80 MG tablet     acetaminophen (TYLENOL) 325 MG tablet     carvedilol (COREG) 12.5 MG tablet     levothyroxine (SYNTHROID/LEVOTHROID) 88 MCG tablet   10. Other specified hypothyroidism E03.8 levothyroxine (SYNTHROID/LEVOTHROID) 88 MCG tablet   11. Stable angina (H) I20.8 carvedilol (COREG) 12.5 MG tablet   12. Coronary artery disease involving native coronary artery of native heart without angina pectoris I25.10 carvedilol (COREG) 12.5 MG tablet     cinnamon (HM CINNAMON) 500 MG CAPS   13. Type 2 diabetes mellitus with diabetic nephropathy, unspecified long  term insulin use status (H) E11.21 Alpha-Lipoic Acid 200 MG TABS   14. Vitamin D deficiency E55.9 Cholecalciferol (VITAMIN D) 2000 UNITS CAPS   15. Psychophysiological insomnia F51.04 zolpidem (AMBIEN) 5 MG tablet           PLAN:       ICD-10-CM    1. Expressive aphasia syndrome R47.01    2. Nonintractable migraine, unspecified migraine type G43.009 HYDROcodone-acetaminophen (NORCO) 7.5-325 MG per tablet     DISCONTINUED: HYDROcodone-acetaminophen (NORCO) 7.5-325 MG per tablet   3. Controlled type 2 diabetes mellitus with microalbuminuria, without long-term current use of insulin (H) E11.29     R80.9    4. Atherosclerosis of aorta (H) I70.0    5. Atherosclerosis of native coronary artery of native heart without angina pectoris I25.10    6. Idiopathic osteoporosis M81.8    7. Hyperlipidemia LDL goal <70 E78.5    8. Hypertension goal BP (blood pressure) < 130/80 I10    9. Cerebrovascular accident (CVA), unspecified mechanism (H) I63.9 clopidogrel (PLAVIX) 75 MG tablet     aspirin 81 MG EC tablet     atorvastatin (LIPITOR) 80 MG tablet     acetaminophen (TYLENOL) 325 MG tablet     carvedilol (COREG) 12.5 MG tablet     levothyroxine (SYNTHROID/LEVOTHROID) 88 MCG tablet   10. Other specified hypothyroidism E03.8 levothyroxine (SYNTHROID/LEVOTHROID) 88 MCG tablet   11. Stable angina (H) I20.8 carvedilol (COREG) 12.5 MG tablet   12. Coronary artery disease involving native coronary artery of native heart without angina pectoris I25.10 carvedilol (COREG) 12.5 MG tablet     cinnamon (HM CINNAMON) 500 MG CAPS   13. Type 2 diabetes mellitus with diabetic nephropathy, unspecified long term insulin use status (H) E11.21 Alpha-Lipoic Acid 200 MG TABS   14. Vitamin D deficiency E55.9 Cholecalciferol (VITAMIN D) 2000 UNITS CAPS   15. Psychophysiological insomnia F51.04 zolpidem (AMBIEN) 5 MG tablet       Patient Instructions     (R47.01) Expressive aphasia syndrome  (primary encounter diagnosis)    Comment:      Plan:  TWICE DAILY          (G43.009) Nonintractable migraine, unspecified migraine type    Comment:      Plan: HYDROcodone-acetaminophen (NORCO) 7.5-325 MG           per tablet, DISCONTINUED:           HYDROcodone-acetaminophen (NORCO) 7.5-325 MG           per tablet           #15         (E11.29,  R80.9) Controlled type 2 diabetes mellitus with microalbuminuria, without long-term current use of insulin (H)    Comment:      Plan:  WELL CONTROLLED         (I70.0) Atherosclerosis of aorta (H)    Comment:  BETTER     Plan:          (I25.10) Atherosclerosis of native coronary artery of native heart without angina pectoris    Comment:      Plan:          (M81.8) Idiopathic osteoporosis    Comment:      Plan:          (E78.5) Hyperlipidemia LDL goal <70    Comment:      Plan:          (I10) Hypertension goal BP (blood pressure) < 130/80    Comment:      Plan:          (I63.9) Cerebrovascular accident (CVA), unspecified mechanism (H)    Comment:      Plan: clopidogrel (PLAVIX) 75 MG tablet, aspirin 81           MG EC tablet, atorvastatin (LIPITOR) 80 MG           tablet, acetaminophen (TYLENOL) 325 MG tablet,           carvedilol (COREG) 12.5 MG tablet,           levothyroxine (SYNTHROID/LEVOTHROID) 88 MCG           tablet                   (E03.8) Other specified hypothyroidism    Comment:      Plan: levothyroxine (SYNTHROID/LEVOTHROID) 88 MCG           tablet                   (I20.8) Stable angina (H)    Comment:       Plan: carvedilol (COREG) 12.5 MG tablet                   (I25.10) Coronary artery disease involving native coronary artery of native heart without angina pectoris    Comment:      Plan: carvedilol (COREG) 12.5 MG tablet, cinnamon (HM          CINNAMON) 500 MG CAPS                    (E11.21) Type 2 diabetes mellitus with diabetic nephropathy, unspecified long term insulin use status (H)    Comment:          Plan: Alpha-Lipoic Acid 200 MG TABS                   (E55.9) Vitamin D deficiency    Comment:      Plan:  Cholecalciferol (VITAMIN D) 2000 UNITS CAPS                   (F51.04) Psychophysiological insomnia    Comment:      Plan: zolpidem (AMBIEN) 5 MG tablet        60 MINUTES SPENT WITH PATIENT OVER HALF OF WHICH WAS COUNSELING AND COORDINATION OF CARE    DISCUSSION CONCERNING STROKE PROGNOSIS    RECURRENT MIGRAINE HEADACHES     AND SLEEPING  ISSUES                   OCHOA HAN MD  [facility]    Answers for HPI/ROS submitted by the patient on 9/23/2017   PHQ9 TOTAL SCORE: Incomplete

## 2017-09-24 NOTE — PATIENT INSTRUCTIONS
(R47.01) Expressive aphasia syndrome  (primary encounter diagnosis)    Comment:      Plan:  TWICE DAILY         (G43.009) Nonintractable migraine, unspecified migraine type    Comment:      Plan: HYDROcodone-acetaminophen (NORCO) 7.5-325 MG           per tablet, DISCONTINUED:           HYDROcodone-acetaminophen (NORCO) 7.5-325 MG           per tablet           #15         (E11.29,  R80.9) Controlled type 2 diabetes mellitus with microalbuminuria, without long-term current use of insulin (H)    Comment:      Plan:  WELL CONTROLLED         (I70.0) Atherosclerosis of aorta (H)    Comment:  BETTER     Plan:          (I25.10) Atherosclerosis of native coronary artery of native heart without angina pectoris    Comment:      Plan:          (M81.8) Idiopathic osteoporosis    Comment:      Plan:          (E78.5) Hyperlipidemia LDL goal <70    Comment:      Plan:          (I10) Hypertension goal BP (blood pressure) < 130/80    Comment:      Plan:          (I63.9) Cerebrovascular accident (CVA), unspecified mechanism (H)    Comment:      Plan: clopidogrel (PLAVIX) 75 MG tablet, aspirin 81           MG EC tablet, atorvastatin (LIPITOR) 80 MG           tablet, acetaminophen (TYLENOL) 325 MG tablet,           carvedilol (COREG) 12.5 MG tablet,           levothyroxine (SYNTHROID/LEVOTHROID) 88 MCG           tablet                   (E03.8) Other specified hypothyroidism    Comment:      Plan: levothyroxine (SYNTHROID/LEVOTHROID) 88 MCG           tablet                   (I20.8) Stable angina (H)    Comment:       Plan: carvedilol (COREG) 12.5 MG tablet                   (I25.10) Coronary artery disease involving native coronary artery of native heart without angina pectoris    Comment:      Plan: carvedilol (COREG) 12.5 MG tablet, cinnamon (HM          CINNAMON) 500 MG CAPS                    (E11.21) Type 2 diabetes mellitus with diabetic nephropathy, unspecified long term insulin use status (H)    Comment:          Plan:  Alpha-Lipoic Acid 200 MG TABS                   (E55.9) Vitamin D deficiency    Comment:      Plan: Cholecalciferol (VITAMIN D) 2000 UNITS CAPS                   (F51.04) Psychophysiological insomnia    Comment:      Plan: zolpidem (AMBIEN) 5 MG tablet        60 MINUTES SPENT WITH PATIENT OVER HALF OF WHICH WAS COUNSELING AND COORDINATION OF CARE    DISCUSSION CONCERNING STROKE PROGNOSIS    RECURRENT MIGRAINE HEADACHES     AND SLEEPING  ISSUES

## 2017-09-25 ENCOUNTER — CARE COORDINATION (OUTPATIENT)
Dept: NEUROLOGY | Facility: CLINIC | Age: 72
End: 2017-09-25

## 2017-09-25 NOTE — PROGRESS NOTES
"Stroke Center Discharge Coordination Note     Responsible Attending physician: Dr. Negrete     Operation performed: None     Date of Discharge: 9/19/17     Discharge to: Home    Current Contact number: [phone]    Current Status:                  New s/s stroke; Trouble w/speech,thinking,processing:  No                               Incision site:  No                                Diabetic:  No                               Pain Management:  \"No, I guess not\"    Receiving Therapy;Where:  Yes; Not set up yet.     Falls:  No    Driving;Working:  No; No                 ADL's:  Independent                               General: \"ok\" difficulty with speech.     Follow up plan: Follow-up with primary care provider within 7 days - seen 9/23/17. Follow-up with stroke provider - scheduled 2/13/18 with Dr. Damico. Home Care SLP referral, Home Care  referral, cardiology evaluation - patient has not been contacted to set up these referrals/appts.     Message sent to cardiology to contact patient to schedule. Spoke with Velvet at [facility] to coordinate home care services. Velvet states patients home address is [place], therefore they did not reach out to her. LVMM x 2 for patient to contact me to determine if she is at home in [place] or staying locally.     Questions: Need appointments scheduled. Patient has my contact information and was encouraged to call with questions/concerns.     What new medications did you start in the hospital and how are you taking those? (compare this to AVS to confirm patient taking correctly)   Current Outpatient Prescriptions   Medication Sig Dispense Refill     clopidogrel (PLAVIX) 75 MG tablet Take 1 tablet (75 mg) by mouth daily 30 tablet 3     aspirin 81 MG EC tablet Take 1 tablet (81 mg) by mouth daily 60 tablet 11     atorvastatin (LIPITOR) 80 MG tablet Take 1 tablet (80 mg) by mouth daily 30 tablet 11     acetaminophen (TYLENOL) 325 MG tablet Take 2 tablets (650 " mg) by mouth every 6 hours as needed for mild pain or fever 100 tablet 11     carvedilol (COREG) 12.5 MG tablet Take 1 tablet (12.5 mg) by mouth 2 times daily (with meals) 60 tablet 11     levothyroxine (SYNTHROID/LEVOTHROID) 88 MCG tablet Take 1 tablet (88 mcg) by mouth every morning (before breakfast) 30 tablet 11     zolpidem (AMBIEN) 5 MG tablet TAKE ONE TABLET BY MOUTH ONCE DAILY AT BEDTIME AS NEEDED 15 tablet 2     levothyroxine (SYNTHROID/LEVOTHROID) 88 MCG tablet Take 1 tablet (88 mcg) by mouth daily 90 tablet 3     carvedilol (COREG) 12.5 MG tablet Take 1 tablet (12.5 mg) by mouth 2 times daily (with meals) 180 tablet 0     cinnamon (HM CINNAMON) 500 MG CAPS Take 2 capfuls by mouth 3 times daily 180 capsule 11     Alpha-Lipoic Acid 200 MG TABS Take 1 tablet by mouth 2 times daily 180 tablet 3     Cholecalciferol (VITAMIN D) 2000 UNITS CAPS Take 1 tablet by mouth daily 90 capsule 3     HYDROcodone-acetaminophen (NORCO) 7.5-325 MG per tablet Take 1 tablet by mouth every 6 hours as needed for moderate to severe pain 15 tablet 0     blood glucose (ZEE MICROLET 2) lancing device Use to test blood sugars TWICE DAILY  times daily or as directed. 300 each 0     ASPIRIN NOT PRESCRIBED, INTENTIONAL, 1 each daily Antiplatelet medication not prescribed intentionally due to Current thienopryridine therapy 0 each 0     SODIUM BICARBONATE PO Take 20 mg by mouth 3 times daily       blood glucose (ZEE CONTOUR) test strip Use to test blood sugars TWICE DAILY  times daily or as directed. 100 strip 11     blood glucose monitoring (ZEE CONTOUR MONITOR) meter device kit Use to test blood sugars TWICE DAILY  times daily or as directed.  MAY HAVE GENERIC CONTROL SOLUTIONS, STRIPS TWICE DAILY AND LANCETS   NEWLY DISCOVERED DIABETES 2 GOAL 8% 250.00  MAY HAVE GENERIC KIT AS WELL 1 kit 0     blood glucose calibration (ZEE CONTOUR) NORMAL solution Use to calibrate blood glucose monitor as directed. 1 Bottle 11     blood  glucose monitoring (ONE TOUCH DELICA) lancets Use to test blood sugars TWICE DAILY  times daily or as directed.  GENERIC OK 1 Box 11     nitroglycerin (NITROSTAT) 0.4 MG SL tablet Place 1 tablet (0.4 mg) under the tongue every 5 minutes as needed for chest pain 25 tablet prn     FISH OIL 3 capsules every morning.         Connect to [place] [employer] (Free service that connects stroke survivors with resources)? Declined

## 2017-10-05 ENCOUNTER — PRE VISIT (OUTPATIENT)
Dept: CARDIOLOGY | Facility: CLINIC | Age: 72
End: 2017-10-05

## 2017-10-05 NOTE — TELEPHONE ENCOUNTER
9/12/17--PARVEZ  Interpretation Summary  Global and regional left ventricular function is normal with an EF of 55-60%.  Global right ventricular function is normal.  The left atrial appendage is free of spontaneous echo contrast and thrombus.  The atrial septum is intact as assessed by color Doppler and agitated saline  bubble study .  The mitral valve leaflet thickness is normal. There is a small mobile mass  attached to the A2 scallop of the mitral valve, consistent with a ruptured  minor chordae. There is mild-moderate mitral regurgitation (ERO 0.18 cm2, RVol  42 mL/beat).  Complex atheroma of the aorta present in the arch and descending thoracic  aorta.  No pericardial effusion present.     No significant change from prior TTE study dated 9/11/17.

## 2017-11-13 ENCOUNTER — TELEPHONE (OUTPATIENT)
Dept: NEUROLOGY | Facility: CLINIC | Age: 72
End: 2017-11-13

## 2017-11-13 NOTE — TELEPHONE ENCOUNTER
Spoke with Dr. Menjivar, patients primary care provider in [place], who states patient is currently living in [place] and has no plans to return to [place]. Patient has refused SLP. Dr. Menjivar will connect patient with cardiology to follow-up on loop recorder and mobile mass, as well as neurology to follow-up on stroke. Message sent to [facility] cardiology to notify of transfer of care and contact Dr. Menjivar with how to proceed with loop recorder.

## 2017-11-13 NOTE — TELEPHONE ENCOUNTER
----- Message from Jyotsna Prasad LPN sent at 11/9/2017 12:38 PM CST -----  Regarding: question  Caller name: Dr [facility]  [phone]    Treating provider/specialty: Reggie     Nurse:    Best time to return call:    Message left?     Description of issue/question:  asking about the cardiac evant monitor-when was it applied and how long should it be on?  caller nor I could find any notes about it.

## 2017-11-16 ENCOUNTER — TELEPHONE (OUTPATIENT)
Dept: NEUROLOGY | Facility: CLINIC | Age: 72
End: 2017-11-16

## 2017-11-16 NOTE — TELEPHONE ENCOUNTER
----- Message from Kaitlin Mcgrath sent at 11/14/2017  7:03 AM CST -----  Regarding: RE: [place] of [place]  Hi Jian,  When she gets there and is getting set up with the new clinic - they'd call our device nurses at [phone] and request the transfer of the link in the computer.     It's usually some sort of device clinic.     Thanks!!  Kaylyn    ----- Message -----     From: Mary Wong, RN     Sent: 11/13/2017   2:38 PM       To: Kaitlin Mcgrath  Subject: ILR out of state                                 Hi Kaylyn,    Patient had ILR placed in September. She has gone back to [place] and will not follow-up in [place]. Please call her primary care provider Dr. Menjivar at [phone] to inform of how to transfer care of ILR, or point me in the direction who may be able to help with this.    Thank you,  Jian

## 2018-01-03 ENCOUNTER — ALLIED HEALTH/NURSE VISIT (OUTPATIENT)
Dept: CARDIOLOGY | Facility: CLINIC | Age: 73
End: 2018-01-03
Attending: INTERNAL MEDICINE
Payer: MEDICARE

## 2018-01-03 DIAGNOSIS — I63.9 ACUTE ISCHEMIC STROKE (H): Primary | ICD-10-CM

## 2018-01-03 PROCEDURE — 93298 REM INTERROG DEV EVAL SCRMS: CPT | Performed by: INTERNAL MEDICINE

## 2018-01-03 PROCEDURE — 93299 HC INTERROGATION DEVICE EVAL REMOTE, LOOP RECORDER: CPT | Mod: ZF

## 2018-01-03 NOTE — MR AVS SNAPSHOT
"              After Visit Summary   1/3/2018    Sowmya Gibbs    MRN: [id number]           Patient Information     Date Of Birth          1945        Visit Information        Provider Department      1/3/2018 6:00 AM  ICD REMOTE [facility]        Today's Diagnoses     Acute ischemic stroke (H)    -  1       Follow-ups after your visit        Your next 10 appointments already scheduled     Feb 13, 2018 11:20 AM CST   (Arrive by 11:05 AM)   New Stroke with Hoa Damico MD   [facility] ([facility])    [address] [place] [id number] [phone]              Who to contact     If you have questions or need follow up information about today's clinic visit or your schedule please contact [facility] directly at [phone].  Normal or non-critical lab and imaging results will be communicated to you by [employer], letter or phone within 4 business days after the clinic has received the results. If you do not hear from us within 7 days, please contact the clinic through Poptiphart or phone. If you have a critical or abnormal lab result, we will notify you by phone as soon as possible.  Submit refill requests through [employer] or call your pharmacy and they will forward the refill request to us. Please allow 3 business days for your refill to be completed.          Additional Information About Your Visit        Poptiphart Information     [employer] lets you send messages to your doctor, view your test results, renew your prescriptions, schedule appointments and more. To sign up, go to [email or web address] . Click on \"Log in\" on the left side of the screen, which will take you to the Welcome page. Then click on \"Sign up Now\" on the right side of the page.     You will be asked to enter the access code listed below, as well as some personal information. Please follow the directions to create your username and password.     Your " access code is: [email or web address]  Expires: 2018 10:46 AM     Your access code will  in 90 days. If you need help or a new code, please call your [facility] clinic or [phone].        Care EveryWhere ID     This is your Care EveryWhere ID. This could be used by other organizations to access your [facility] medical records  [id number]        Your Vitals Were     Last Period                   2003            Blood Pressure from Last 3 Encounters:   17 100/62   17 100/56   16 98/60    Weight from Last 3 Encounters:   17 53.1 kg (117 lb)   17 50.6 kg (111 lb 8 oz)   16 55.2 kg (121 lb 9.6 oz)              We Performed the Following     REMOTE LOOP/OPTIVOL INTERROGATION        Primary Care Provider Office Phone # Fax #    Chaka Nya Knott [phone]       [address] [facility] [address]        Equal Access to Services     [facility]: Hadii aad ku hadasho Soomaali, waaxda luqadaha, qaybta kaalmada adeegyada, waxay esha abdullahi . So [facility] [phone].    ATENCIÓN: Si habla español, tiene a ni disposición servicios gratuitos de asistencia lingüística. Llame al [phone].    We comply with applicable federal civil rights laws and [place] laws. We do not discriminate on the basis of race, color, national origin, age, disability, sex, sexual orientation, or gender identity.            Thank you!     Thank you for choosing [facility]  for your care. Our goal is always to provide you with excellent care. Hearing back from our patients is one way we can continue to improve our services. Please take a few minutes to complete the written survey that you may receive in the mail after your visit with us. Thank you!             Your Updated Medication List - Protect others around you: Learn how to safely use, store and throw away your medicines at www.disposemymeds.org.          This list is accurate as of: 1/3/18  11:59 PM.  Always use your most recent med list.                   Brand Name Dispense Instructions for use Diagnosis    acetaminophen 325 MG tablet    TYLENOL    100 tablet    Take 2 tablets (650 mg) by mouth every 6 hours as needed for mild pain or fever    Cerebrovascular accident (CVA), unspecified mechanism (H)       Alpha-Lipoic Acid 200 MG Tabs     180 tablet    Take 1 tablet by mouth 2 times daily    Type 2 diabetes mellitus with diabetic nephropathy, unspecified long term insulin use status (H)       aspirin 81 MG EC tablet     60 tablet    Take 1 tablet (81 mg) by mouth daily    Cerebrovascular accident (CVA), unspecified mechanism (H)       ASPIRIN NOT PRESCRIBED    INTENTIONAL    0 each    1 each daily Antiplatelet medication not prescribed intentionally due to Current thienopryridine therapy    Type 2 diabetes mellitus, controlled, with renal complications (H), Coronary artery disease involving native coronary artery without angina pectoris       atorvastatin 80 MG tablet    LIPITOR    30 tablet    Take 1 tablet (80 mg) by mouth daily    Cerebrovascular accident (CVA), unspecified mechanism (H)       blood glucose calibration NORMAL solution     1 Bottle    Use to calibrate blood glucose monitor as directed.    Type 2 diabetes, HbA1C goal < 8% (H)       blood glucose lancing device     300 each    Use to test blood sugars TWICE DAILY  times daily or as directed.    Type 2 diabetes mellitus with diabetic nephropathy (H)       blood glucose monitoring lancets     1 Box    Use to test blood sugars TWICE DAILY  times daily or as directed. GENERIC OK    Type 2 diabetes, HbA1C goal < 8% (H)       blood glucose monitoring meter device kit     1 kit    Use to test blood sugars TWICE DAILY  times daily or as directed. MAY HAVE GENERIC CONTROL SOLUTIONS, STRIPS TWICE DAILY AND LANCETS  NEWLY DISCOVERED DIABETES 2 GOAL 8% 250.00 MAY HAVE GENERIC KIT AS WELL    Type 2 diabetes, HbA1C goal < 8% (H)       blood  glucose monitoring test strip    ZEE CONTOUR    100 strip    Use to test blood sugars TWICE DAILY  times daily or as directed.    Type 2 diabetes, HbA1C goal < 8% (H)       * carvedilol 12.5 MG tablet    COREG    60 tablet    Take 1 tablet (12.5 mg) by mouth 2 times daily (with meals)    Cerebrovascular accident (CVA), unspecified mechanism (H)       * carvedilol 12.5 MG tablet    COREG    180 tablet    Take 1 tablet (12.5 mg) by mouth 2 times daily (with meals)    Stable angina (H), Coronary artery disease involving native coronary artery of native heart without angina pectoris       cinnamon 500 MG Caps    HM CINNAMON    180 capsule    Take 2 capfuls by mouth 3 times daily    Coronary artery disease involving native coronary artery of native heart without angina pectoris       clopidogrel 75 MG tablet    PLAVIX    30 tablet    Take 1 tablet (75 mg) by mouth daily    Cerebrovascular accident (CVA), unspecified mechanism (H)       FISH OIL      3 capsules every morning.        HYDROcodone-acetaminophen 7.5-325 MG per tablet    NORCO    15 tablet    Take 1 tablet by mouth every 6 hours as needed for moderate to severe pain    Nonintractable migraine, unspecified migraine type       * levothyroxine 88 MCG tablet    SYNTHROID/LEVOTHROID    30 tablet    Take 1 tablet (88 mcg) by mouth every morning (before breakfast)    Cerebrovascular accident (CVA), unspecified mechanism (H)       * levothyroxine 88 MCG tablet    SYNTHROID/LEVOTHROID    90 tablet    Take 1 tablet (88 mcg) by mouth daily    Other specified hypothyroidism       nitroGLYcerin 0.4 MG sublingual tablet    NITROSTAT    25 tablet    Place 1 tablet (0.4 mg) under the tongue every 5 minutes as needed for chest pain    CAD (coronary artery disease)       SODIUM BICARBONATE PO      Take 20 mg by mouth 3 times daily        vitamin D 2000 UNITS Caps     90 capsule    Take 1 tablet by mouth daily    Vitamin D deficiency       zolpidem 5 MG tablet    AMBIEN    15  tablet    TAKE ONE TABLET BY MOUTH ONCE DAILY AT BEDTIME AS NEEDED    Psychophysiological insomnia       * Notice:  This list has 4 medication(s) that are the same as other medications prescribed for you. Read the directions carefully, and ask your doctor or other care provider to review them with you.

## 2018-01-04 NOTE — PROGRESS NOTES
Remote loop recorder transmission received and reviewed.  Device transmission sent per MD orders.  Patient has a [employer] loop recorder.  Normal loop recorder function.  No patient activated episodes recorded.  Presenting EGM = SR @ 72 bpm.  Loop recorder battery status = good.  Patient notified of interrogation results via .  Request call back to inform us where device is being established in [place].  Patient has move to [place] transfer care there with Dr Menjivar.    Remote loop recorder transmission

## 2018-02-06 NOTE — TELEPHONE ENCOUNTER
APPT INFO    Date /Time: 2/13/18 11:20AM   Reason for Appt: stroke   Ref Provider/Clinic: self referred listed   Are there internal records? Yes/No?  IF YES, list clinic names: YES     [facility]  Labs 2017  MR brain 9/10/17  MRA brain 9/10/17  CT head 9/10/17   Are there outside records? Yes/No? NO   Patient Contact (Y/N) & Call Details: NO - referral   Action: Reviewed records

## 2018-02-13 ENCOUNTER — PRE VISIT (OUTPATIENT)
Dept: NEUROLOGY | Facility: CLINIC | Age: 73
End: 2018-02-13

## 2018-04-05 ENCOUNTER — ALLIED HEALTH/NURSE VISIT (OUTPATIENT)
Dept: CARDIOLOGY | Facility: CLINIC | Age: 73
End: 2018-04-05
Attending: INTERNAL MEDICINE
Payer: MEDICARE

## 2018-04-05 DIAGNOSIS — G45.9 TRANSIENT CEREBRAL ISCHEMIA: Primary | ICD-10-CM

## 2018-04-05 PROCEDURE — 93299 HC INTERROGATION DEVICE EVAL REMOTE, LOOP RECORDER: CPT | Mod: ZF

## 2018-04-05 NOTE — MR AVS SNAPSHOT
"              After Visit Summary   2018    Sowmya Gibbs    MRN: [id number]           Patient Information     Date Of Birth          1945        Visit Information        Provider Department      2018 6:00 AM UC ICD REMOTE [facility]        Today's Diagnoses     Transient cerebral ischemia    -  1       Follow-ups after your visit        Who to contact     If you have questions or need follow up information about today's clinic visit or your schedule please contact [facility] directly at [phone].  Normal or non-critical lab and imaging results will be communicated to you by [employer], letter or phone within 4 business days after the clinic has received the results. If you do not hear from us within 7 days, please contact the clinic through [employer] or phone. If you have a critical or abnormal lab result, we will notify you by phone as soon as possible.  Submit refill requests through [employer] or call your pharmacy and they will forward the refill request to us. Please allow 3 business days for your refill to be completed.          Additional Information About Your Visit        MyChart Information     [employer] lets you send messages to your doctor, view your test results, renew your prescriptions, schedule appointments and more. To sign up, go to [email or web address] . Click on \"Log in\" on the left side of the screen, which will take you to the Welcome page. Then click on \"Sign up Now\" on the right side of the page.     You will be asked to enter the access code listed below, as well as some personal information. Please follow the directions to create your username and password.     Your access code is: [email or web address]  Expires: 2018 10:33 AM     Your access code will  in 90 days. If you need help or a new code, please call your [place] clinic or [phone].        Care EveryWhere ID     This is your Care EveryWhere ID. This could be used by other organizations to " access your [place] medical records  [id number]        Your Vitals Were     Last Period                   01/25/2003            Blood Pressure from Last 3 Encounters:   09/23/17 100/62   09/21/17 100/56   02/04/16 98/60    Weight from Last 3 Encounters:   09/23/17 53.1 kg (117 lb)   09/17/17 50.6 kg (111 lb 8 oz)   02/04/16 55.2 kg (121 lb 9.6 oz)              We Performed the Following     REMOTE LOOP/OPTIVOL INTERROGATION        Primary Care Provider Office Phone # Fax #    Chaka Nya Knott [phone] [id number] [facility] [address]        Equal Access to Services     KATHY BRICENO : Hadii aad ku hadasho Sodayanaali, waaxda luqadaha, qaybta kaalmada adeegyada, deirdre abdullahi . So [facility] [phone].    ATENCIÓN: Si habla español, tiene a ni disposición servicios gratuitos de asistencia lingüística. [facility] [phone].    We comply with applicable federal civil rights laws and [place] laws. We do not discriminate on the basis of race, color, national origin, age, disability, sex, sexual orientation, or gender identity.            Thank you!     Thank you for choosing [facility]  for your care. Our goal is always to provide you with excellent care. Hearing back from our patients is one way we can continue to improve our services. Please take a few minutes to complete the written survey that you may receive in the mail after your visit with us. Thank you!             Your Updated Medication List - Protect others around you: Learn how to safely use, store and throw away your medicines at www.disposemymeds.org.          This list is accurate as of 4/5/18 11:59 PM.  Always use your most recent med list.                   Brand Name Dispense Instructions for use Diagnosis    acetaminophen 325 MG tablet    TYLENOL    100 tablet    Take 2 tablets (650 mg) by mouth every 6 hours as needed for mild pain or fever    Cerebrovascular accident (CVA),  unspecified mechanism (H)       Alpha-Lipoic Acid 200 MG Tabs     180 tablet    Take 1 tablet by mouth 2 times daily    Type 2 diabetes mellitus with diabetic nephropathy, unspecified long term insulin use status (H)       aspirin 81 MG EC tablet     60 tablet    Take 1 tablet (81 mg) by mouth daily    Cerebrovascular accident (CVA), unspecified mechanism (H)       ASPIRIN NOT PRESCRIBED    INTENTIONAL    0 each    1 each daily Antiplatelet medication not prescribed intentionally due to Current thienopryridine therapy    Type 2 diabetes mellitus, controlled, with renal complications (H), Coronary artery disease involving native coronary artery without angina pectoris       atorvastatin 80 MG tablet    LIPITOR    30 tablet    Take 1 tablet (80 mg) by mouth daily    Cerebrovascular accident (CVA), unspecified mechanism (H)       blood glucose calibration Normal solution     1 Bottle    Use to calibrate blood glucose monitor as directed.    Type 2 diabetes, HbA1C goal < 8% (H)       blood glucose lancing device     300 each    Use to test blood sugars TWICE DAILY  times daily or as directed.    Type 2 diabetes mellitus with diabetic nephropathy (H)       blood glucose monitoring lancets     1 Box    Use to test blood sugars TWICE DAILY  times daily or as directed. GENERIC OK    Type 2 diabetes, HbA1C goal < 8% (H)       blood glucose monitoring meter device kit     1 kit    Use to test blood sugars TWICE DAILY  times daily or as directed. MAY HAVE GENERIC CONTROL SOLUTIONS, STRIPS TWICE DAILY AND LANCETS  NEWLY DISCOVERED DIABETES 2 GOAL 8% 250.00 MAY HAVE GENERIC KIT AS WELL    Type 2 diabetes, HbA1C goal < 8% (H)       blood glucose monitoring test strip    ZEE CONTOUR    100 strip    Use to test blood sugars TWICE DAILY  times daily or as directed.    Type 2 diabetes, HbA1C goal < 8% (H)       * carvedilol 12.5 MG tablet    COREG    60 tablet    Take 1 tablet (12.5 mg) by mouth 2 times daily (with meals)     Cerebrovascular accident (CVA), unspecified mechanism (H)       * carvedilol 12.5 MG tablet    COREG    180 tablet    Take 1 tablet (12.5 mg) by mouth 2 times daily (with meals)    Stable angina (H), Coronary artery disease involving native coronary artery of native heart without angina pectoris       cinnamon 500 MG Caps    HM CINNAMON    180 capsule    Take 2 capfuls by mouth 3 times daily    Coronary artery disease involving native coronary artery of native heart without angina pectoris       clopidogrel 75 MG tablet    PLAVIX    30 tablet    Take 1 tablet (75 mg) by mouth daily    Cerebrovascular accident (CVA), unspecified mechanism (H)       FISH OIL      3 capsules every morning.        HYDROcodone-acetaminophen 7.5-325 MG per tablet    NORCO    15 tablet    Take 1 tablet by mouth every 6 hours as needed for moderate to severe pain    Nonintractable migraine, unspecified migraine type       * levothyroxine 88 MCG tablet    SYNTHROID/LEVOTHROID    30 tablet    Take 1 tablet (88 mcg) by mouth every morning (before breakfast)    Cerebrovascular accident (CVA), unspecified mechanism (H)       * levothyroxine 88 MCG tablet    SYNTHROID/LEVOTHROID    90 tablet    Take 1 tablet (88 mcg) by mouth daily    Other specified hypothyroidism       nitroGLYcerin 0.4 MG sublingual tablet    NITROSTAT    25 tablet    Place 1 tablet (0.4 mg) under the tongue every 5 minutes as needed for chest pain    CAD (coronary artery disease)       SODIUM BICARBONATE PO      Take 20 mg by mouth 3 times daily        vitamin D 2000 units Caps     90 capsule    Take 1 tablet by mouth daily    Vitamin D deficiency       zolpidem 5 MG tablet    AMBIEN    15 tablet    TAKE ONE TABLET BY MOUTH ONCE DAILY AT BEDTIME AS NEEDED    Psychophysiological insomnia       * Notice:  This list has 4 medication(s) that are the same as other medications prescribed for you. Read the directions carefully, and ask your doctor or other care provider to review  them with you.

## 2018-05-03 NOTE — PROGRESS NOTES
"Preliminary Device Interrogation Results.  Final physician signed paceart report to be scanned and attached.    [employer] remote ILR transmission received and reviewed. Device transmission sent per MD orders. Alert received for a 4 second pause on 3/30/18 at 2239. Pt has recently moved to [place] and has not established care with cardiology or neurology. She states \"slow heart rates aren't new, I've had them all my life\" and \"I just want them to take this thing out\". Normal device function. Pt was instructed to call our device clinic with any changes and when she has a new cardiologist. She verbalized understanding.  Remote ILR transmission      "

## (undated) RX ORDER — LIDOCAINE HYDROCHLORIDE AND EPINEPHRINE 10; 10 MG/ML; UG/ML
INJECTION, SOLUTION INFILTRATION; PERINEURAL
Status: DISPENSED
Start: 2017-09-15

## (undated) RX ORDER — CLINDAMYCIN PHOSPHATE 900 MG/50ML
INJECTION, SOLUTION INTRAVENOUS
Status: DISPENSED
Start: 2017-09-15

## (undated) RX ORDER — FENTANYL CITRATE 50 UG/ML
INJECTION, SOLUTION INTRAMUSCULAR; INTRAVENOUS
Status: DISPENSED
Start: 2017-09-12